# Patient Record
Sex: MALE | Race: WHITE | NOT HISPANIC OR LATINO | Employment: FULL TIME | URBAN - METROPOLITAN AREA
[De-identification: names, ages, dates, MRNs, and addresses within clinical notes are randomized per-mention and may not be internally consistent; named-entity substitution may affect disease eponyms.]

---

## 2023-07-27 DIAGNOSIS — E78.5 HYPERLIPIDEMIA, UNSPECIFIED: ICD-10-CM

## 2023-07-27 RX ORDER — ROSUVASTATIN CALCIUM 20 MG/1
20 TABLET, COATED ORAL DAILY
Qty: 90 TABLET | Refills: 0 | Status: SHIPPED | OUTPATIENT
Start: 2023-07-27 | End: 2023-10-26

## 2023-08-30 RX ORDER — LOSARTAN POTASSIUM 25 MG/1
25 TABLET ORAL DAILY
COMMUNITY
End: 2023-08-31 | Stop reason: SDUPTHER

## 2023-08-30 RX ORDER — METFORMIN HYDROCHLORIDE 500 MG/1
500 TABLET, EXTENDED RELEASE ORAL
COMMUNITY
Start: 2022-08-29 | End: 2023-08-31 | Stop reason: SDUPTHER

## 2023-08-31 ENCOUNTER — OFFICE VISIT (OUTPATIENT)
Dept: PRIMARY CARE | Facility: CLINIC | Age: 63
End: 2023-08-31
Payer: COMMERCIAL

## 2023-08-31 VITALS — DIASTOLIC BLOOD PRESSURE: 88 MMHG | BODY MASS INDEX: 42.37 KG/M2 | SYSTOLIC BLOOD PRESSURE: 152 MMHG | WEIGHT: 315 LBS

## 2023-08-31 DIAGNOSIS — E78.2 MIXED HYPERLIPIDEMIA: ICD-10-CM

## 2023-08-31 DIAGNOSIS — Z00.00 HEALTH CARE MAINTENANCE: ICD-10-CM

## 2023-08-31 DIAGNOSIS — E66.01 CLASS 3 SEVERE OBESITY DUE TO EXCESS CALORIES WITH SERIOUS COMORBIDITY AND BODY MASS INDEX (BMI) OF 40.0 TO 44.9 IN ADULT (MULTI): ICD-10-CM

## 2023-08-31 DIAGNOSIS — Z12.11 COLON CANCER SCREENING: ICD-10-CM

## 2023-08-31 DIAGNOSIS — Z12.5 SCREENING FOR PROSTATE CANCER: ICD-10-CM

## 2023-08-31 DIAGNOSIS — Z00.00 HEALTHCARE MAINTENANCE: ICD-10-CM

## 2023-08-31 DIAGNOSIS — E11.69 TYPE 2 DIABETES MELLITUS WITH OTHER SPECIFIED COMPLICATION, WITHOUT LONG-TERM CURRENT USE OF INSULIN (MULTI): ICD-10-CM

## 2023-08-31 DIAGNOSIS — R29.818 SUSPECTED SLEEP APNEA: ICD-10-CM

## 2023-08-31 DIAGNOSIS — Z00.00 ENCOUNTER FOR PREVENTIVE HEALTH EXAMINATION: ICD-10-CM

## 2023-08-31 DIAGNOSIS — I10 PRIMARY HYPERTENSION: Primary | ICD-10-CM

## 2023-08-31 PROCEDURE — 1036F TOBACCO NON-USER: CPT | Performed by: STUDENT IN AN ORGANIZED HEALTH CARE EDUCATION/TRAINING PROGRAM

## 2023-08-31 PROCEDURE — 90471 IMMUNIZATION ADMIN: CPT | Performed by: STUDENT IN AN ORGANIZED HEALTH CARE EDUCATION/TRAINING PROGRAM

## 2023-08-31 PROCEDURE — 3077F SYST BP >= 140 MM HG: CPT | Performed by: STUDENT IN AN ORGANIZED HEALTH CARE EDUCATION/TRAINING PROGRAM

## 2023-08-31 PROCEDURE — 3008F BODY MASS INDEX DOCD: CPT | Performed by: STUDENT IN AN ORGANIZED HEALTH CARE EDUCATION/TRAINING PROGRAM

## 2023-08-31 PROCEDURE — 90732 PPSV23 VACC 2 YRS+ SUBQ/IM: CPT | Performed by: STUDENT IN AN ORGANIZED HEALTH CARE EDUCATION/TRAINING PROGRAM

## 2023-08-31 PROCEDURE — 99387 INIT PM E/M NEW PAT 65+ YRS: CPT | Performed by: STUDENT IN AN ORGANIZED HEALTH CARE EDUCATION/TRAINING PROGRAM

## 2023-08-31 PROCEDURE — 4010F ACE/ARB THERAPY RXD/TAKEN: CPT | Performed by: STUDENT IN AN ORGANIZED HEALTH CARE EDUCATION/TRAINING PROGRAM

## 2023-08-31 PROCEDURE — 3079F DIAST BP 80-89 MM HG: CPT | Performed by: STUDENT IN AN ORGANIZED HEALTH CARE EDUCATION/TRAINING PROGRAM

## 2023-08-31 RX ORDER — LOSARTAN POTASSIUM 25 MG/1
25 TABLET ORAL DAILY
Qty: 90 TABLET | Refills: 1 | Status: SHIPPED | OUTPATIENT
Start: 2023-08-31 | End: 2023-12-18 | Stop reason: SDUPTHER

## 2023-08-31 RX ORDER — METFORMIN HYDROCHLORIDE 500 MG/1
500 TABLET, EXTENDED RELEASE ORAL
Qty: 180 TABLET | Refills: 1 | Status: SHIPPED | OUTPATIENT
Start: 2023-08-31 | End: 2024-03-18

## 2023-08-31 NOTE — PROGRESS NOTES
Subjective   Patient ID: Eligio Hogue is a 63 y.o. male who presents for Establish Care.    HPI   Initial PCP visit.    Yearly physical.    Mr. Eligio Hogue is a 63 year old male with h/o DM2, HTN, HLD, obesity.    He has been seeing cardiology recently for intermittent chest discomfort, says he has had recent stress test, echo, cardiac event monitor. No current CP. CP is not associated with exertion.    He was diagnosed with DM2 about 1 year ago. He was advised to start metformin at that time, but he did not do so 2/2 concern for SE.    He complains of daytime fatigue, does not think he snores much.    He lives in [place] with his wife.  for [employer]. He quit smoking more than 15 years ago. Minimal alcohol use.    Mother had heart valve disease and ESRD.  Brother has DM and ESRD  Review of Systems  12-point ROS reviewed and was negative unless otherwise noted in HPI.    Objective   /88   Wt 60.8 kg (134 lb)   BMI 17.20 kg/m²     Physical Exam  GEN: conversant, NAD  HEENT: PERRL, EOMI, MMM  NECK: supple, no carotid bruits appreciated b/l  CV: S1, S2, RRR  PULM: CTAB  ABD: soft, NT, ND  NEURO: no new gross focal deficits  EXT: no sig LE edema  PSYCH: appropriate affect    Assessment/Plan     #DM2: start metformin, discussed SE profile. Repeat A1c prior to our next visit.    #Chest discomfort: he is seeing Dr. Mejia from cardiology. Had recent stress test, echo, event monitor    #HLD: continue statin, check lipids    #HTN: restart losartan, monitor metabolic panel    #fatigue/Severe obesity, class 3: advised increased efforts at diet, exercise, weight loss. Referral to sleep medicine for suspected FLOYD.    #Health maintenance: colonoscopy done 6/2022, repeat ordered. He says that he has had AAA screening. Advised Shingrix, yearly flu shot. Pneumovax given 2023. Routine labs.    RTC 3 mo

## 2023-09-08 ENCOUNTER — LAB (OUTPATIENT)
Dept: LAB | Facility: LAB | Age: 63
End: 2023-09-08
Payer: COMMERCIAL

## 2023-09-08 DIAGNOSIS — Z00.00 HEALTHCARE MAINTENANCE: ICD-10-CM

## 2023-09-08 DIAGNOSIS — Z12.5 SCREENING FOR PROSTATE CANCER: ICD-10-CM

## 2023-09-08 DIAGNOSIS — Z00.00 HEALTH CARE MAINTENANCE: ICD-10-CM

## 2023-09-08 LAB
ALANINE AMINOTRANSFERASE (SGPT) (U/L) IN SER/PLAS: 16 U/L (ref 10–52)
ALBUMIN (G/DL) IN SER/PLAS: 4 G/DL (ref 3.4–5)
ALKALINE PHOSPHATASE (U/L) IN SER/PLAS: 61 U/L (ref 33–136)
ANION GAP IN SER/PLAS: 11 MMOL/L (ref 10–20)
ASPARTATE AMINOTRANSFERASE (SGOT) (U/L) IN SER/PLAS: 17 U/L (ref 9–39)
BILIRUBIN TOTAL (MG/DL) IN SER/PLAS: 0.7 MG/DL (ref 0–1.2)
CALCIUM (MG/DL) IN SER/PLAS: 9 MG/DL (ref 8.6–10.6)
CARBON DIOXIDE, TOTAL (MMOL/L) IN SER/PLAS: 31 MMOL/L (ref 21–32)
CHLORIDE (MMOL/L) IN SER/PLAS: 103 MMOL/L (ref 98–107)
CHOLESTEROL (MG/DL) IN SER/PLAS: 87 MG/DL (ref 0–199)
CHOLESTEROL IN HDL (MG/DL) IN SER/PLAS: 28.9 MG/DL
CHOLESTEROL/HDL RATIO: 3
CREATININE (MG/DL) IN SER/PLAS: 1.35 MG/DL (ref 0.5–1.3)
ERYTHROCYTE DISTRIBUTION WIDTH (RATIO) BY AUTOMATED COUNT: 13.4 % (ref 11.5–14.5)
ERYTHROCYTE MEAN CORPUSCULAR HEMOGLOBIN CONCENTRATION (G/DL) BY AUTOMATED: 33.9 G/DL (ref 32–36)
ERYTHROCYTE MEAN CORPUSCULAR VOLUME (FL) BY AUTOMATED COUNT: 87 FL (ref 80–100)
ERYTHROCYTES (10*6/UL) IN BLOOD BY AUTOMATED COUNT: 4.99 X10E12/L (ref 4.5–5.9)
ESTIMATED AVERAGE GLUCOSE FOR HBA1C: 151 MG/DL
GFR MALE: 58 ML/MIN/1.73M2
GLUCOSE (MG/DL) IN SER/PLAS: 158 MG/DL (ref 74–99)
HEMATOCRIT (%) IN BLOOD BY AUTOMATED COUNT: 43.4 % (ref 41–52)
HEMOGLOBIN (G/DL) IN BLOOD: 14.7 G/DL (ref 13.5–17.5)
HEMOGLOBIN A1C/HEMOGLOBIN TOTAL IN BLOOD: 6.9 %
LDL: 35 MG/DL (ref 0–99)
LEUKOCYTES (10*3/UL) IN BLOOD BY AUTOMATED COUNT: 8.9 X10E9/L (ref 4.4–11.3)
NRBC (PER 100 WBCS) BY AUTOMATED COUNT: 0 /100 WBC (ref 0–0)
PLATELETS (10*3/UL) IN BLOOD AUTOMATED COUNT: 240 X10E9/L (ref 150–450)
POTASSIUM (MMOL/L) IN SER/PLAS: 4.6 MMOL/L (ref 3.5–5.3)
PROSTATE SPECIFIC AG (NG/ML) IN SER/PLAS: 1.78 NG/ML (ref 0–4)
PROTEIN TOTAL: 6.3 G/DL (ref 6.4–8.2)
SODIUM (MMOL/L) IN SER/PLAS: 140 MMOL/L (ref 136–145)
THYROTROPIN (MIU/L) IN SER/PLAS BY DETECTION LIMIT <= 0.05 MIU/L: 5.45 MIU/L (ref 0.44–3.98)
THYROXINE (T4) FREE (NG/DL) IN SER/PLAS: 0.9 NG/DL (ref 0.78–1.48)
TRIGLYCERIDE (MG/DL) IN SER/PLAS: 115 MG/DL (ref 0–149)
UREA NITROGEN (MG/DL) IN SER/PLAS: 21 MG/DL (ref 6–23)
VLDL: 23 MG/DL (ref 0–40)

## 2023-09-08 PROCEDURE — 80053 COMPREHEN METABOLIC PANEL: CPT

## 2023-09-08 PROCEDURE — 36415 COLL VENOUS BLD VENIPUNCTURE: CPT

## 2023-09-08 PROCEDURE — 84443 ASSAY THYROID STIM HORMONE: CPT

## 2023-09-08 PROCEDURE — 85027 COMPLETE CBC AUTOMATED: CPT

## 2023-09-08 PROCEDURE — 84439 ASSAY OF FREE THYROXINE: CPT

## 2023-09-08 PROCEDURE — 80061 LIPID PANEL: CPT

## 2023-09-08 PROCEDURE — 83036 HEMOGLOBIN GLYCOSYLATED A1C: CPT

## 2023-09-08 PROCEDURE — 84153 ASSAY OF PSA TOTAL: CPT

## 2023-09-19 ENCOUNTER — OFFICE VISIT (OUTPATIENT)
Dept: PRIMARY CARE | Facility: CLINIC | Age: 63
End: 2023-09-19
Payer: COMMERCIAL

## 2023-09-19 VITALS — SYSTOLIC BLOOD PRESSURE: 146 MMHG | DIASTOLIC BLOOD PRESSURE: 90 MMHG

## 2023-09-19 DIAGNOSIS — Z00.00 HEALTHCARE MAINTENANCE: ICD-10-CM

## 2023-09-19 DIAGNOSIS — E78.2 MIXED HYPERLIPIDEMIA: ICD-10-CM

## 2023-09-19 DIAGNOSIS — E11.69 TYPE 2 DIABETES MELLITUS WITH OTHER SPECIFIED COMPLICATION, WITHOUT LONG-TERM CURRENT USE OF INSULIN (MULTI): Primary | ICD-10-CM

## 2023-09-19 DIAGNOSIS — I10 PRIMARY HYPERTENSION: ICD-10-CM

## 2023-09-19 DIAGNOSIS — E66.01 CLASS 3 SEVERE OBESITY DUE TO EXCESS CALORIES WITH SERIOUS COMORBIDITY AND BODY MASS INDEX (BMI) OF 40.0 TO 44.9 IN ADULT (MULTI): ICD-10-CM

## 2023-09-19 PROCEDURE — 3077F SYST BP >= 140 MM HG: CPT | Performed by: STUDENT IN AN ORGANIZED HEALTH CARE EDUCATION/TRAINING PROGRAM

## 2023-09-19 PROCEDURE — 99213 OFFICE O/P EST LOW 20 MIN: CPT | Performed by: STUDENT IN AN ORGANIZED HEALTH CARE EDUCATION/TRAINING PROGRAM

## 2023-09-19 PROCEDURE — 3008F BODY MASS INDEX DOCD: CPT | Performed by: STUDENT IN AN ORGANIZED HEALTH CARE EDUCATION/TRAINING PROGRAM

## 2023-09-19 PROCEDURE — 4010F ACE/ARB THERAPY RXD/TAKEN: CPT | Performed by: STUDENT IN AN ORGANIZED HEALTH CARE EDUCATION/TRAINING PROGRAM

## 2023-09-19 PROCEDURE — 3044F HG A1C LEVEL LT 7.0%: CPT | Performed by: STUDENT IN AN ORGANIZED HEALTH CARE EDUCATION/TRAINING PROGRAM

## 2023-09-19 PROCEDURE — 3080F DIAST BP >= 90 MM HG: CPT | Performed by: STUDENT IN AN ORGANIZED HEALTH CARE EDUCATION/TRAINING PROGRAM

## 2023-09-19 PROCEDURE — 1036F TOBACCO NON-USER: CPT | Performed by: STUDENT IN AN ORGANIZED HEALTH CARE EDUCATION/TRAINING PROGRAM

## 2023-09-19 NOTE — PROGRESS NOTES
Subjective   Patient ID: Eligio Hogue is a 63 y.o. male who presents for Follow-up.    HPI   Routine fu.  At our last visit patient was started on metformin and losartan. He is tolerating the medications well. He is monitoring BP at home, systolic 120s-130s.      Review of Systems  12-point ROS reviewed and was negative unless otherwise noted in HPI.    Objective   There were no vitals taken for this visit.    Physical Exam  GEN: conversant, NAD  HEENT: PERRL, EOMI, MMM  NECK: supple, no carotid bruits appreciated b/l  CV: S1, S2, RRR  PULM: CTAB  ABD: soft, NT, obese  NEURO: no new gross focal deficits  EXT: no sig LE edema  PSYCH: appropriate affect    Assessment/Plan     #DM2: continue metformin. A1c 6.9% in 9/2023.     #Chest discomfort: he is seeing Dr. Mejia from cardiology. Had recent stress test, echo, event monitor     #HLD: continue statin, check lipids     #HTN: continue losartan     #fatigue/Severe obesity, class 3: advised increased efforts at diet, exercise, weight loss. Referral to sleep medicine for suspected FLOYD.     #Health maintenance: colonoscopy done 6/2022, repeat ordered. He says that he has had AAA screening. Advised Shingrix, yearly flu shot. Pneumovax given 2023. Recent labs reviewed.     RTC 3 mo

## 2023-10-26 DIAGNOSIS — E78.5 HYPERLIPIDEMIA, UNSPECIFIED: ICD-10-CM

## 2023-10-26 RX ORDER — ROSUVASTATIN CALCIUM 20 MG/1
20 TABLET, COATED ORAL DAILY
Qty: 90 TABLET | Refills: 0 | Status: SHIPPED | OUTPATIENT
Start: 2023-10-26 | End: 2024-01-29

## 2023-11-13 ENCOUNTER — PREP FOR PROCEDURE (OUTPATIENT)
Dept: GASTROENTEROLOGY | Facility: HOSPITAL | Age: 63
End: 2023-11-13
Payer: COMMERCIAL

## 2023-11-27 ENCOUNTER — HOSPITAL ENCOUNTER (OUTPATIENT)
Dept: GASTROENTEROLOGY | Facility: HOSPITAL | Age: 63
Discharge: HOME | End: 2023-11-27
Payer: COMMERCIAL

## 2023-11-27 ENCOUNTER — ANESTHESIA EVENT (OUTPATIENT)
Dept: GASTROENTEROLOGY | Facility: HOSPITAL | Age: 63
End: 2023-11-27
Payer: COMMERCIAL

## 2023-11-27 ENCOUNTER — ANESTHESIA (OUTPATIENT)
Dept: GASTROENTEROLOGY | Facility: HOSPITAL | Age: 63
End: 2023-11-27
Payer: COMMERCIAL

## 2023-11-27 VITALS
OXYGEN SATURATION: 97 % | DIASTOLIC BLOOD PRESSURE: 81 MMHG | TEMPERATURE: 96.8 F | RESPIRATION RATE: 16 BRPM | SYSTOLIC BLOOD PRESSURE: 150 MMHG | HEART RATE: 56 BPM

## 2023-11-27 DIAGNOSIS — D12.6 COLON ADENOMA: ICD-10-CM

## 2023-11-27 DIAGNOSIS — Z12.11 COLON CANCER SCREENING: ICD-10-CM

## 2023-11-27 PROBLEM — Z98.890 HISTORY OF GENERAL ANESTHESIA: Status: ACTIVE | Noted: 2023-11-27

## 2023-11-27 LAB
GLUCOSE BLD MANUAL STRIP-MCNC: 131 MG/DL (ref 74–99)
GLUCOSE BLD MANUAL STRIP-MCNC: 157 MG/DL (ref 74–99)

## 2023-11-27 PROCEDURE — 3700000001 HC GENERAL ANESTHESIA TIME - INITIAL BASE CHARGE

## 2023-11-27 PROCEDURE — A45385 PR COLONOSCOPY,REMV LESN,SNARE: Performed by: ANESTHESIOLOGY

## 2023-11-27 PROCEDURE — 82947 ASSAY GLUCOSE BLOOD QUANT: CPT

## 2023-11-27 PROCEDURE — 45385 COLONOSCOPY W/LESION REMOVAL: CPT | Performed by: INTERNAL MEDICINE

## 2023-11-27 PROCEDURE — 2500000004 HC RX 250 GENERAL PHARMACY W/ HCPCS (ALT 636 FOR OP/ED): Performed by: ANESTHESIOLOGY

## 2023-11-27 PROCEDURE — 2500000005 HC RX 250 GENERAL PHARMACY W/O HCPCS: Performed by: NURSE ANESTHETIST, CERTIFIED REGISTERED

## 2023-11-27 PROCEDURE — 2500000004 HC RX 250 GENERAL PHARMACY W/ HCPCS (ALT 636 FOR OP/ED): Performed by: NURSE ANESTHETIST, CERTIFIED REGISTERED

## 2023-11-27 PROCEDURE — 7100000009 HC PHASE TWO TIME - INITIAL BASE CHARGE

## 2023-11-27 PROCEDURE — 88305 TISSUE EXAM BY PATHOLOGIST: CPT | Performed by: PATHOLOGY

## 2023-11-27 PROCEDURE — 88305 TISSUE EXAM BY PATHOLOGIST: CPT | Mod: TC | Performed by: INTERNAL MEDICINE

## 2023-11-27 PROCEDURE — 45381 COLONOSCOPY SUBMUCOUS NJX: CPT | Performed by: INTERNAL MEDICINE

## 2023-11-27 PROCEDURE — 45380 COLONOSCOPY AND BIOPSY: CPT | Performed by: INTERNAL MEDICINE

## 2023-11-27 PROCEDURE — A45385 PR COLONOSCOPY,REMV LESN,SNARE: Performed by: NURSE ANESTHETIST, CERTIFIED REGISTERED

## 2023-11-27 PROCEDURE — 7100000010 HC PHASE TWO TIME - EACH INCREMENTAL 1 MINUTE

## 2023-11-27 PROCEDURE — 3700000002 HC GENERAL ANESTHESIA TIME - EACH INCREMENTAL 1 MINUTE

## 2023-11-27 RX ORDER — PROPOFOL 10 MG/ML
INJECTION, EMULSION INTRAVENOUS CONTINUOUS PRN
Status: DISCONTINUED | OUTPATIENT
Start: 2023-11-27 | End: 2023-11-27

## 2023-11-27 RX ORDER — PROPOFOL 10 MG/ML
INJECTION, EMULSION INTRAVENOUS AS NEEDED
Status: DISCONTINUED | OUTPATIENT
Start: 2023-11-27 | End: 2023-11-27

## 2023-11-27 RX ORDER — SODIUM CHLORIDE, SODIUM LACTATE, POTASSIUM CHLORIDE, CALCIUM CHLORIDE 600; 310; 30; 20 MG/100ML; MG/100ML; MG/100ML; MG/100ML
75 INJECTION, SOLUTION INTRAVENOUS CONTINUOUS
Status: DISCONTINUED | OUTPATIENT
Start: 2023-11-27 | End: 2023-11-28 | Stop reason: HOSPADM

## 2023-11-27 RX ORDER — LIDOCAINE HCL/PF 100 MG/5ML
SYRINGE (ML) INTRAVENOUS AS NEEDED
Status: DISCONTINUED | OUTPATIENT
Start: 2023-11-27 | End: 2023-11-27

## 2023-11-27 RX ADMIN — SODIUM CHLORIDE, POTASSIUM CHLORIDE, SODIUM LACTATE AND CALCIUM CHLORIDE 75 ML/HR: 600; 310; 30; 20 INJECTION, SOLUTION INTRAVENOUS at 09:44

## 2023-11-27 RX ADMIN — PROPOFOL 20 MG: 10 INJECTION, EMULSION INTRAVENOUS at 10:38

## 2023-11-27 RX ADMIN — LIDOCAINE HYDROCHLORIDE 80 MG: 20 INJECTION INTRAVENOUS at 10:30

## 2023-11-27 RX ADMIN — PROPOFOL 50 MG: 10 INJECTION, EMULSION INTRAVENOUS at 10:30

## 2023-11-27 RX ADMIN — PROPOFOL 80 MCG/KG/MIN: 10 INJECTION, EMULSION INTRAVENOUS at 10:30

## 2023-11-27 SDOH — HEALTH STABILITY: MENTAL HEALTH: CURRENT SMOKER: 0

## 2023-11-27 ASSESSMENT — PAIN - FUNCTIONAL ASSESSMENT
PAIN_FUNCTIONAL_ASSESSMENT: 0-10

## 2023-11-27 ASSESSMENT — PAIN SCALES - GENERAL
PAINLEVEL_OUTOF10: 0 - NO PAIN
PAIN_LEVEL: 0
PAINLEVEL_OUTOF10: 0 - NO PAIN

## 2023-11-27 ASSESSMENT — COLUMBIA-SUICIDE SEVERITY RATING SCALE - C-SSRS
6. HAVE YOU EVER DONE ANYTHING, STARTED TO DO ANYTHING, OR PREPARED TO DO ANYTHING TO END YOUR LIFE?: NO
1. IN THE PAST MONTH, HAVE YOU WISHED YOU WERE DEAD OR WISHED YOU COULD GO TO SLEEP AND NOT WAKE UP?: NO
2. HAVE YOU ACTUALLY HAD ANY THOUGHTS OF KILLING YOURSELF?: NO

## 2023-11-27 NOTE — ANESTHESIA POSTPROCEDURE EVALUATION
Patient: Eligio Hogue    Procedure Summary       Date: 11/27/23 Room / Location: [facility]    Anesthesia Start: 1026 Anesthesia Stop: 1119    Procedure: COLONOSCOPY Diagnosis: Colon adenoma    Scheduled Providers: Dariel Burt MD Responsible Provider: Rolando Aguilar MD    Anesthesia Type: MAC ASA Status: 3            Anesthesia Type: MAC    Vitals Value Taken Time   /61 11/27/23 1117   Temp 36 °C (96.8 °F) 11/27/23 1117   Pulse 70 11/27/23 1117   Resp 18 11/27/23 1117   SpO2 99 % 11/27/23 1117       Anesthesia Post Evaluation    Patient location during evaluation: PACU  Patient participation: complete - patient participated  Level of consciousness: awake and alert  Pain score: 0  Pain management: adequate  Airway patency: patent  Cardiovascular status: acceptable, blood pressure returned to baseline and hemodynamically stable  Respiratory status: acceptable and face mask  Hydration status: acceptable  Postoperative Nausea and Vomiting: none        There were no known notable events for this encounter.

## 2023-11-27 NOTE — H&P
Procedure H&P    Patient Profile-Procedures  Name Eligio Hogue  Date of Birth 1960  MRN [id number]  Address   [address]  [facility] [id number]  [facility] [address]    Primary Phone Number [phone]  Secondary Phone Number    Alycia Maciel    Procedure(s):  Procedures: Colonoscopy  Primary contact name and number   Extended Emergency Contact Information  Primary Emergency Contact: Ellie Hogue  Home Phone: [phone]  Relation: Spouse    General Health  Weight There were no vitals filed for this visit.  BMI There is no height or weight on file to calculate BMI.    Allergies  Allergies   Allergen Reactions    Penicillins Other       Past Medical History   Past Medical History:   Diagnosis Date    Other chest pain 02/16/2021    Chest tightness or pressure    Pain in right foot 04/22/2016    Acute foot pain, right    Personal history of other benign neoplasm     History of benign neoplasm of pharynx    Personal history of other diseases of the respiratory system     History of chronic obstructive lung disease    Personal history of other endocrine, nutritional and metabolic disease     History of thyroid disorder    Rash and other nonspecific skin eruption 02/16/2021    Skin rash       Provider assessment  Diagnosis: Colon Cancer Screening/Surveillance   Medication Reviewed - yes  Prior to Admission medications    Medication Sig Start Date End Date Taking? Authorizing Provider   losartan (Cozaar) 25 mg tablet Take 1 tablet (25 mg) by mouth once daily. 8/31/23  Yes Vernon Estrada MD   metFORMIN  mg 24 hr tablet Take 1 tablet (500 mg) by mouth 2 times a day with meals. 8/31/23  Yes Vernon Estrada MD   rosuvastatin (Crestor) 20 mg tablet TAKE 1 TABLET BY MOUTH EVERY DAY 10/26/23  Yes Vernon Estrada MD       Physical Exam  Vitals:    11/27/23 0918   Pulse: 65   Resp: 18   Temp: 36.1 °C (97 °F)   SpO2: 94%        General: A&Ox3, NAD.  HEENT: AT/NC.   CV: RRR. No murmur.  Resp: CTA  bilaterally. No wheezing, rhonchi or rales.   GI: Soft, NT/ND. BSx4.  Extrem: No edema. Pulses intact.  Skin: No Jaundice.   Neuro: No focal deficits.   Psych: Normal mood and affect.      Procedure Plan - pre-procedural (re)assesment completed by physician:  discharge/transfer patient when discharge criteria met    Dariel Burt MD  11/27/2023 10:25 AM

## 2023-11-27 NOTE — ANESTHESIA PREPROCEDURE EVALUATION
Patient: Eligio Hogue    Procedure Information       Date/Time: 11/27/23 1000    Scheduled providers: Dariel Burt MD    Procedure: COLONOSCOPY    Location: [facility]            Relevant Problems   Anesthesia   (+) History of general anesthesia       Clinical information reviewed:   Tobacco  Allergies  Meds   Med Hx  Surg Hx   Fam Hx  Soc Hx        NPO Detail:  NPO/Void Status  Date of Last Liquid: 11/27/23  Time of Last Liquid: 0400  Date of Last Solid: 11/26/23  Time of Last Solid: 0800         Physical Exam    Airway  Mallampati: III  TM distance: >3 FB  Neck ROM: full     Cardiovascular - normal exam     Dental   (+) upper dentures, lower dentures     Pulmonary - normal exam     Abdominal   (+) obese             Anesthesia Plan    ASA 3     MAC     The patient is not a current smoker.  Patient was previously instructed to abstain from smoking on day of procedure.  Patient did not smoke on day of procedure.    intravenous induction   Anesthetic plan and risks discussed with patient.  Use of blood products discussed with patient who consented to blood products.    Plan discussed with CRNA.

## 2023-12-12 LAB
LABORATORY COMMENT REPORT: NORMAL
PATH REPORT.FINAL DX SPEC: NORMAL
PATH REPORT.GROSS SPEC: NORMAL
PATH REPORT.TOTAL CANCER: NORMAL

## 2023-12-14 DIAGNOSIS — D12.6 COLON ADENOMA: Primary | ICD-10-CM

## 2023-12-18 ENCOUNTER — OFFICE VISIT (OUTPATIENT)
Dept: PRIMARY CARE | Facility: CLINIC | Age: 63
End: 2023-12-18
Payer: COMMERCIAL

## 2023-12-18 VITALS
DIASTOLIC BLOOD PRESSURE: 82 MMHG | BODY MASS INDEX: 40.43 KG/M2 | HEIGHT: 74 IN | WEIGHT: 315 LBS | SYSTOLIC BLOOD PRESSURE: 144 MMHG

## 2023-12-18 DIAGNOSIS — M79.89 LEG SWELLING: Primary | ICD-10-CM

## 2023-12-18 DIAGNOSIS — I10 PRIMARY HYPERTENSION: ICD-10-CM

## 2023-12-18 DIAGNOSIS — E78.2 MIXED HYPERLIPIDEMIA: ICD-10-CM

## 2023-12-18 DIAGNOSIS — Z00.00 HEALTHCARE MAINTENANCE: ICD-10-CM

## 2023-12-18 DIAGNOSIS — E11.69 TYPE 2 DIABETES MELLITUS WITH OTHER SPECIFIED COMPLICATION, WITHOUT LONG-TERM CURRENT USE OF INSULIN (MULTI): ICD-10-CM

## 2023-12-18 DIAGNOSIS — E66.01 CLASS 3 SEVERE OBESITY DUE TO EXCESS CALORIES WITH SERIOUS COMORBIDITY AND BODY MASS INDEX (BMI) OF 40.0 TO 44.9 IN ADULT (MULTI): ICD-10-CM

## 2023-12-18 PROCEDURE — 3077F SYST BP >= 140 MM HG: CPT | Performed by: STUDENT IN AN ORGANIZED HEALTH CARE EDUCATION/TRAINING PROGRAM

## 2023-12-18 PROCEDURE — 4010F ACE/ARB THERAPY RXD/TAKEN: CPT | Performed by: STUDENT IN AN ORGANIZED HEALTH CARE EDUCATION/TRAINING PROGRAM

## 2023-12-18 PROCEDURE — 99397 PER PM REEVAL EST PAT 65+ YR: CPT | Performed by: STUDENT IN AN ORGANIZED HEALTH CARE EDUCATION/TRAINING PROGRAM

## 2023-12-18 PROCEDURE — 1159F MED LIST DOCD IN RCRD: CPT | Performed by: STUDENT IN AN ORGANIZED HEALTH CARE EDUCATION/TRAINING PROGRAM

## 2023-12-18 PROCEDURE — 1036F TOBACCO NON-USER: CPT | Performed by: STUDENT IN AN ORGANIZED HEALTH CARE EDUCATION/TRAINING PROGRAM

## 2023-12-18 PROCEDURE — 3044F HG A1C LEVEL LT 7.0%: CPT | Performed by: STUDENT IN AN ORGANIZED HEALTH CARE EDUCATION/TRAINING PROGRAM

## 2023-12-18 PROCEDURE — 3079F DIAST BP 80-89 MM HG: CPT | Performed by: STUDENT IN AN ORGANIZED HEALTH CARE EDUCATION/TRAINING PROGRAM

## 2023-12-18 PROCEDURE — 3008F BODY MASS INDEX DOCD: CPT | Performed by: STUDENT IN AN ORGANIZED HEALTH CARE EDUCATION/TRAINING PROGRAM

## 2023-12-18 PROCEDURE — 1126F AMNT PAIN NOTED NONE PRSNT: CPT | Performed by: STUDENT IN AN ORGANIZED HEALTH CARE EDUCATION/TRAINING PROGRAM

## 2023-12-18 RX ORDER — METOPROLOL TARTRATE 25 MG/1
25 TABLET, FILM COATED ORAL 2 TIMES DAILY
COMMUNITY
Start: 2023-09-26 | End: 2024-03-18 | Stop reason: SDUPTHER

## 2023-12-18 RX ORDER — LOSARTAN POTASSIUM 50 MG/1
50 TABLET ORAL DAILY
Qty: 90 TABLET | Refills: 1 | Status: SHIPPED | OUTPATIENT
Start: 2023-12-18

## 2023-12-18 NOTE — PROGRESS NOTES
"Subjective   Patient ID: Eligio Hogue is a 63 y.o. male who presents for Annual Exam.    HPI   Routine fu. Yearly physical.  Med refill.  Since our last visit, patient was diagnosed with pAfib by cardiology. He was started on metoprolol. AC was advised, but patient has not yet started this.    He is not very physically active.    He complains of swelling in his left leg for several weeks, no pain.    Review of Systems  12-point ROS reviewed and was negative unless otherwise noted in HPI.    Objective   /82   Ht 1.88 m (6' 2\")   Wt 150 kg (330 lb)   BMI 42.37 kg/m²     Physical Exam  GEN: conversant, NAD  HEENT: PERRL, EOMI, MMM  NECK: supple, full  CV: S1, S2, RRR  PULM: CTAB  ABD: soft, NT, ND  NEURO: no new gross focal deficits  EXT: 1+ LLE edema  PSYCH: appropriate affect    Assessment/Plan     #HTN: increase losartan to 50mg daily    #DM2: continue metformin. A1c 6.9% in 9/2023.     #Afib: he is seeing Dr. Mejia from cardiology. Had recent stress test, echo, event monitor. Also had recent diagnosis of Afib, AC has been recommended, patient is trying to decide on his AC options.     #HLD: continue statin    #fatigue/Severe obesity, class 3: advised increased efforts at diet, exercise, weight loss. Referral to sleep medicine for suspected FLOYD.     #Health maintenance: colonoscopy done 11/2023, he has been referred to [facility] for difficult to remove polyp. He says that he has had AAA screening. Advised Shingrix, yearly flu shot. Pneumovax given 2023. Recent labs reviewed.     RTC 3 mo     "

## 2024-01-10 ENCOUNTER — HOSPITAL ENCOUNTER (OUTPATIENT)
Dept: VASCULAR MEDICINE | Facility: CLINIC | Age: 64
Discharge: HOME | End: 2024-01-10
Payer: COMMERCIAL

## 2024-01-10 DIAGNOSIS — R60.0 LOCALIZED EDEMA: ICD-10-CM

## 2024-01-10 DIAGNOSIS — M79.89 LEG SWELLING: ICD-10-CM

## 2024-01-10 PROCEDURE — 93971 EXTREMITY STUDY: CPT | Performed by: SURGERY

## 2024-01-10 PROCEDURE — 93971 EXTREMITY STUDY: CPT

## 2024-03-06 DIAGNOSIS — E11.69 TYPE 2 DIABETES MELLITUS WITH OTHER SPECIFIED COMPLICATION, WITHOUT LONG-TERM CURRENT USE OF INSULIN (MULTI): ICD-10-CM

## 2024-03-18 ENCOUNTER — OFFICE VISIT (OUTPATIENT)
Dept: PRIMARY CARE | Facility: CLINIC | Age: 64
End: 2024-03-18
Payer: COMMERCIAL

## 2024-03-18 VITALS — SYSTOLIC BLOOD PRESSURE: 130 MMHG | DIASTOLIC BLOOD PRESSURE: 78 MMHG

## 2024-03-18 DIAGNOSIS — R79.89 ELEVATED TSH: ICD-10-CM

## 2024-03-18 DIAGNOSIS — I10 PRIMARY HYPERTENSION: ICD-10-CM

## 2024-03-18 DIAGNOSIS — E78.2 MIXED HYPERLIPIDEMIA: ICD-10-CM

## 2024-03-18 DIAGNOSIS — I48.0 PAROXYSMAL ATRIAL FIBRILLATION (MULTI): Primary | ICD-10-CM

## 2024-03-18 DIAGNOSIS — K63.5 POLYP OF COLON, UNSPECIFIED PART OF COLON, UNSPECIFIED TYPE: ICD-10-CM

## 2024-03-18 DIAGNOSIS — E66.01 CLASS 3 SEVERE OBESITY DUE TO EXCESS CALORIES WITH SERIOUS COMORBIDITY AND BODY MASS INDEX (BMI) OF 40.0 TO 44.9 IN ADULT (MULTI): ICD-10-CM

## 2024-03-18 DIAGNOSIS — R29.818 SUSPECTED SLEEP APNEA: ICD-10-CM

## 2024-03-18 DIAGNOSIS — E11.69 TYPE 2 DIABETES MELLITUS WITH OTHER SPECIFIED COMPLICATION, WITHOUT LONG-TERM CURRENT USE OF INSULIN (MULTI): ICD-10-CM

## 2024-03-18 PROCEDURE — 3008F BODY MASS INDEX DOCD: CPT | Performed by: STUDENT IN AN ORGANIZED HEALTH CARE EDUCATION/TRAINING PROGRAM

## 2024-03-18 PROCEDURE — 3075F SYST BP GE 130 - 139MM HG: CPT | Performed by: STUDENT IN AN ORGANIZED HEALTH CARE EDUCATION/TRAINING PROGRAM

## 2024-03-18 PROCEDURE — G2211 COMPLEX E/M VISIT ADD ON: HCPCS | Performed by: STUDENT IN AN ORGANIZED HEALTH CARE EDUCATION/TRAINING PROGRAM

## 2024-03-18 PROCEDURE — 3078F DIAST BP <80 MM HG: CPT | Performed by: STUDENT IN AN ORGANIZED HEALTH CARE EDUCATION/TRAINING PROGRAM

## 2024-03-18 PROCEDURE — 4010F ACE/ARB THERAPY RXD/TAKEN: CPT | Performed by: STUDENT IN AN ORGANIZED HEALTH CARE EDUCATION/TRAINING PROGRAM

## 2024-03-18 PROCEDURE — 99215 OFFICE O/P EST HI 40 MIN: CPT | Performed by: STUDENT IN AN ORGANIZED HEALTH CARE EDUCATION/TRAINING PROGRAM

## 2024-03-18 PROCEDURE — 1036F TOBACCO NON-USER: CPT | Performed by: STUDENT IN AN ORGANIZED HEALTH CARE EDUCATION/TRAINING PROGRAM

## 2024-03-18 RX ORDER — METOPROLOL TARTRATE 25 MG/1
25 TABLET, FILM COATED ORAL 2 TIMES DAILY
Qty: 180 TABLET | Refills: 1 | Status: SHIPPED | OUTPATIENT
Start: 2024-03-18

## 2024-03-18 RX ORDER — METFORMIN HYDROCHLORIDE 500 MG/1
500 TABLET, EXTENDED RELEASE ORAL
Qty: 180 TABLET | Refills: 1 | Status: SHIPPED | OUTPATIENT
Start: 2024-03-18

## 2024-03-18 NOTE — PROGRESS NOTES
"Subjective   Patient ID: Eligio Hogue is a 64 y.o. male who presents for Follow-up (Med refill).    HPI   Routine fu.  Med refill.  Longitudinal follow-up.  Since our last visit, patient stopped taking losartan and rosuvastatin \"because the didn't make me feel any different.\" He blood pressure at home is usually around 140/90. Blood sugars range from  typically.     He is still having intermittent episodes of Afib. Cardiology recommended patient take Eliquis, but he has so far declined this.    At our last visit, we strongly advised FLOYD evaluation, but patient did not pursue this. He has a watch that measures O2 levels when sleeping, patient brought this for review today, he thought levels were good, but he misinterpreted the graph which actually shows he spends most of the night with low oxygen levels.  Review of Systems  12-point ROS reviewed and was negative unless otherwise noted in HPI.    Objective   There were no vitals taken for this visit.    Physical Exam  GEN: conversant, NAD  HEENT: PERRL, EOMI, MMM  NECK: supple, no carotid bruits appreciated b/l  CV: S1, S2, RRR  PULM: CTAB  ABD: soft, NT, ND  NEURO: no new gross focal deficits  EXT: no sig LE edema  PSYCH: appropriate affect    Assessment/Plan     #fatigue/Severe obesity, class 3: strongly suspected FLOYD. Referral again to sleep medicine for suspected FLOYD. Long discussion today about importance of this. Advised increased efforts at diet, exercise, weight loss.     #HTN: resume losartan, monitor metabolic panel. Home readings reviewed.    #HLD: resume statin     #DM2: advised increasing metformin dose, patient declines. A1c 6.9% in 9/2023. Home glucose readings reviewed.     #Afib: he is seeing Dr. Garsia from cardiology. Had stress test, echo, event monitor. Also had recent diagnosis of Afib, AC has been recommended, patient has so far declined    #Elevated TSH: monitor     #Health maintenance: colonoscopy done 11/2023, he has been referred to " [facility] for difficult to remove polyp. He says that he has had AAA screening. Advised Shingrix, yearly flu shot. Pneumovax given 2023.      RTC 3 mo    I spent >40 minutes in direct patient care, counseling, and/or coordination of care.

## 2024-03-22 ENCOUNTER — TELEPHONE (OUTPATIENT)
Dept: GASTROENTEROLOGY | Facility: HOSPITAL | Age: 64
End: 2024-03-22
Payer: COMMERCIAL

## 2024-03-22 DIAGNOSIS — Z12.11 ENCOUNTER FOR SCREENING COLONOSCOPY: Primary | ICD-10-CM

## 2024-03-22 NOTE — TELEPHONE ENCOUNTER
Patient is scheduled for Colonoscopy , he Called in with Request for a prescription of Nulytely Prep be sent in to his  pharmacy on file.

## 2024-03-25 RX ORDER — POLYETHYLENE GLYCOL 3350, SODIUM CHLORIDE, SODIUM BICARBONATE, POTASSIUM CHLORIDE 420; 11.2; 5.72; 1.48 G/4L; G/4L; G/4L; G/4L
4000 POWDER, FOR SOLUTION ORAL ONCE
Qty: 4000 ML | Refills: 0 | Status: SHIPPED | OUTPATIENT
Start: 2024-03-25 | End: 2024-03-25

## 2024-04-05 ENCOUNTER — ANESTHESIA (OUTPATIENT)
Dept: GASTROENTEROLOGY | Facility: HOSPITAL | Age: 64
End: 2024-04-05
Payer: COMMERCIAL

## 2024-04-05 ENCOUNTER — ANESTHESIA EVENT (OUTPATIENT)
Dept: GASTROENTEROLOGY | Facility: HOSPITAL | Age: 64
End: 2024-04-05
Payer: COMMERCIAL

## 2024-04-05 ENCOUNTER — HOSPITAL ENCOUNTER (OUTPATIENT)
Dept: GASTROENTEROLOGY | Facility: HOSPITAL | Age: 64
Setting detail: OUTPATIENT SURGERY
Discharge: HOME | End: 2024-04-05
Payer: COMMERCIAL

## 2024-04-05 VITALS
RESPIRATION RATE: 18 BRPM | DIASTOLIC BLOOD PRESSURE: 73 MMHG | BODY MASS INDEX: 39.17 KG/M2 | HEIGHT: 75 IN | OXYGEN SATURATION: 95 % | TEMPERATURE: 97.3 F | SYSTOLIC BLOOD PRESSURE: 145 MMHG | HEART RATE: 65 BPM | WEIGHT: 315 LBS

## 2024-04-05 DIAGNOSIS — D12.6 COLON ADENOMA: ICD-10-CM

## 2024-04-05 LAB — GLUCOSE BLD MANUAL STRIP-MCNC: 175 MG/DL (ref 74–99)

## 2024-04-05 PROCEDURE — 82947 ASSAY GLUCOSE BLOOD QUANT: CPT

## 2024-04-05 PROCEDURE — 88305 TISSUE EXAM BY PATHOLOGIST: CPT | Mod: TC,AHULAB | Performed by: INTERNAL MEDICINE

## 2024-04-05 PROCEDURE — A45385 PR COLONOSCOPY,REMV LESN,SNARE: Performed by: NURSE ANESTHETIST, CERTIFIED REGISTERED

## 2024-04-05 PROCEDURE — 2500000004 HC RX 250 GENERAL PHARMACY W/ HCPCS (ALT 636 FOR OP/ED): Performed by: NURSE ANESTHETIST, CERTIFIED REGISTERED

## 2024-04-05 PROCEDURE — 2720000007 HC OR 272 NO HCPCS

## 2024-04-05 PROCEDURE — 3700000002 HC GENERAL ANESTHESIA TIME - EACH INCREMENTAL 1 MINUTE

## 2024-04-05 PROCEDURE — 2500000004 HC RX 250 GENERAL PHARMACY W/ HCPCS (ALT 636 FOR OP/ED): Performed by: INTERNAL MEDICINE

## 2024-04-05 PROCEDURE — 3700000001 HC GENERAL ANESTHESIA TIME - INITIAL BASE CHARGE

## 2024-04-05 PROCEDURE — 7100000009 HC PHASE TWO TIME - INITIAL BASE CHARGE

## 2024-04-05 PROCEDURE — 45385 COLONOSCOPY W/LESION REMOVAL: CPT | Performed by: INTERNAL MEDICINE

## 2024-04-05 PROCEDURE — 45381 COLONOSCOPY SUBMUCOUS NJX: CPT | Performed by: INTERNAL MEDICINE

## 2024-04-05 PROCEDURE — A45385 PR COLONOSCOPY,REMV LESN,SNARE: Performed by: ANESTHESIOLOGY

## 2024-04-05 PROCEDURE — 88305 TISSUE EXAM BY PATHOLOGIST: CPT | Performed by: PATHOLOGY

## 2024-04-05 PROCEDURE — 7100000010 HC PHASE TWO TIME - EACH INCREMENTAL 1 MINUTE

## 2024-04-05 RX ORDER — SODIUM CHLORIDE, SODIUM LACTATE, POTASSIUM CHLORIDE, CALCIUM CHLORIDE 600; 310; 30; 20 MG/100ML; MG/100ML; MG/100ML; MG/100ML
INJECTION, SOLUTION INTRAVENOUS CONTINUOUS PRN
Status: DISCONTINUED | OUTPATIENT
Start: 2024-04-05 | End: 2024-04-05

## 2024-04-05 RX ORDER — MIDAZOLAM HYDROCHLORIDE 1 MG/ML
INJECTION, SOLUTION INTRAMUSCULAR; INTRAVENOUS AS NEEDED
Status: DISCONTINUED | OUTPATIENT
Start: 2024-04-05 | End: 2024-04-05

## 2024-04-05 RX ORDER — FENTANYL CITRATE 50 UG/ML
INJECTION, SOLUTION INTRAMUSCULAR; INTRAVENOUS AS NEEDED
Status: DISCONTINUED | OUTPATIENT
Start: 2024-04-05 | End: 2024-04-05

## 2024-04-05 RX ORDER — MIDAZOLAM HYDROCHLORIDE 1 MG/ML
INJECTION INTRAMUSCULAR; INTRAVENOUS AS NEEDED
Status: DISCONTINUED | OUTPATIENT
Start: 2024-04-05 | End: 2024-04-05

## 2024-04-05 RX ORDER — EPINEPHRINE 0.1 MG/ML
INJECTION INTRACARDIAC; INTRAVENOUS AS NEEDED
Status: COMPLETED | OUTPATIENT
Start: 2024-04-05 | End: 2024-04-05

## 2024-04-05 RX ADMIN — MIDAZOLAM HYDROCHLORIDE 2 MG: 1 INJECTION INTRAMUSCULAR; INTRAVENOUS at 09:42

## 2024-04-05 RX ADMIN — SODIUM CHLORIDE, POTASSIUM CHLORIDE, SODIUM LACTATE AND CALCIUM CHLORIDE: 600; 310; 30; 20 INJECTION, SOLUTION INTRAVENOUS at 09:43

## 2024-04-05 RX ADMIN — FENTANYL CITRATE 50 MCG: 50 INJECTION, SOLUTION INTRAMUSCULAR; INTRAVENOUS at 10:22

## 2024-04-05 RX ADMIN — FENTANYL CITRATE 50 MCG: 50 INJECTION, SOLUTION INTRAMUSCULAR; INTRAVENOUS at 10:26

## 2024-04-05 RX ADMIN — FENTANYL CITRATE 50 MCG: 50 INJECTION, SOLUTION INTRAMUSCULAR; INTRAVENOUS at 10:00

## 2024-04-05 RX ADMIN — FENTANYL CITRATE 50 MCG: 50 INJECTION, SOLUTION INTRAMUSCULAR; INTRAVENOUS at 09:54

## 2024-04-05 RX ADMIN — MIDAZOLAM HYDROCHLORIDE 1 MG: 1 INJECTION INTRAMUSCULAR; INTRAVENOUS at 10:03

## 2024-04-05 RX ADMIN — MIDAZOLAM HYDROCHLORIDE 1 MG: 1 INJECTION INTRAMUSCULAR; INTRAVENOUS at 10:04

## 2024-04-05 RX ADMIN — EPINEPHRINE 0.05 MG: 0.1 INJECTION, SOLUTION ENDOTRACHEAL; INTRACARDIAC; INTRAVENOUS at 10:54

## 2024-04-05 SDOH — HEALTH STABILITY: MENTAL HEALTH: CURRENT SMOKER: 0

## 2024-04-05 ASSESSMENT — PAIN - FUNCTIONAL ASSESSMENT
PAIN_FUNCTIONAL_ASSESSMENT: 0-10

## 2024-04-05 ASSESSMENT — PAIN SCALES - GENERAL
PAIN_LEVEL: 4
PAINLEVEL_OUTOF10: 0 - NO PAIN

## 2024-04-05 ASSESSMENT — COLUMBIA-SUICIDE SEVERITY RATING SCALE - C-SSRS
6. HAVE YOU EVER DONE ANYTHING, STARTED TO DO ANYTHING, OR PREPARED TO DO ANYTHING TO END YOUR LIFE?: NO
2. HAVE YOU ACTUALLY HAD ANY THOUGHTS OF KILLING YOURSELF?: NO
1. IN THE PAST MONTH, HAVE YOU WISHED YOU WERE DEAD OR WISHED YOU COULD GO TO SLEEP AND NOT WAKE UP?: NO

## 2024-04-05 NOTE — ANESTHESIA PREPROCEDURE EVALUATION
Patient: Eligio Hogue    Procedure Information       Date/Time: 04/05/24 0950    Scheduled providers: Angie Bill MD; Frank Bey MD; MELBA Rene-HUYEN; Angie Connor RN    Procedure: COLONOSCOPY    Location: [facility]            Relevant Problems   No relevant active problems       Clinical information reviewed:   Tobacco  Allergies  Meds   Med Hx  Surg Hx   Fam Hx  Soc Hx        NPO Detail:  NPO/Void Status  Carbohydrate Drink Given Prior to Surgery? : N  Date of Last Liquid: 04/05/24  Time of Last Liquid: 0100  Date of Last Solid: 04/04/24  Time of Last Solid: 0800  Last Intake Type: Clear fluids  Time of Last Void: 0850         Physical Exam    Airway  Mallampati: I  TM distance: >3 FB  Neck ROM: full     Cardiovascular - normal exam     Dental - normal exam     Pulmonary - normal exam     Abdominal - normal exam             Anesthesia Plan    History of general anesthesia?: yes  History of complications of general anesthesia?: no    ASA 2     MAC     The patient is not a current smoker.  Patient was not previously instructed to abstain from smoking on day of procedure.  Patient did not smoke on day of procedure.    intravenous induction   Postoperative administration of opioids is intended.  Anesthetic plan and risks discussed with patient.  Use of blood products discussed with patient who.    Plan discussed with CRNA.

## 2024-04-05 NOTE — H&P
"History Of Present Illness  Eligio Hogue is a 64 y.o. male presenting with cecal polyp.     Past Medical History  Past Medical History:   Diagnosis Date    A-fib (CMS/HCC)     DM2 (diabetes mellitus, type 2) (CMS/[facility])     Other chest pain 02/16/2021    Chest tightness or pressure    Pain in right foot 04/22/2016    Acute foot pain, right    Personal history of other benign neoplasm     History of benign neoplasm of pharynx    Personal history of other diseases of the respiratory system     History of chronic obstructive lung disease    Personal history of other endocrine, nutritional and metabolic disease     History of thyroid disorder    Rash and other nonspecific skin eruption 02/16/2021    Skin rash     Surgical History  Past Surgical History:   Procedure Laterality Date    OTHER SURGICAL HISTORY  03/10/2016    Excision Of Tracheal Tumor    OTHER SURGICAL HISTORY  08/09/2018    Oral Surgery Tooth Extraction [facility]     Social History  He reports that he has quit smoking. His smoking use included cigarettes. He has never used smokeless tobacco. He reports current alcohol use. He reports that he does not currently use drugs.    Family History  Family History   Problem Relation Name Age of Onset    Heart disease Mother      Diabetes Brother          Allergies  Allergies   Allergen Reactions    Penicillins Other     Blisters in mouth      Omeprazole Rash     Review of Systems     Physical Exam     Last Recorded Vitals  Blood pressure 174/77, pulse 69, temperature 36.6 °C (97.9 °F), temperature source Temporal, resp. rate 16, height 1.905 m (6' 3\"), weight (!) 161 kg (355 lb 13.2 oz), SpO2 96 %.    Assessment/Plan   colonosopy     Angie Bill MD  " abnormal

## 2024-04-05 NOTE — SIGNIFICANT EVENT
Discharge teaching completed with pt and wife with good understanding;.   Physical assessment unchanged

## 2024-04-05 NOTE — ANESTHESIA POSTPROCEDURE EVALUATION
Patient: Eligio Hogue    Procedure Summary       Date: 04/05/24 Room / Location: [facility]    Anesthesia Start: 0943 Anesthesia Stop: 1100    Procedure: COLONOSCOPY Diagnosis: Colon adenoma    Scheduled Providers: Angie Bill MD; Frank Bey MD; MELBA Rene-HUYEN; Angie Connor RN Responsible Provider: Frank Bey MD    Anesthesia Type: MAC ASA Status: 2            Anesthesia Type: MAC    Vitals Value Taken Time   /71 04/05/24 1115   Temp 36.3 °C (97.3 °F) 04/05/24 1058   Pulse 59 04/05/24 1115   Resp 18 04/05/24 1115   SpO2 95 % 04/05/24 1115       Anesthesia Post Evaluation    Patient location during evaluation: PACU  Patient participation: complete - patient participated  Level of consciousness: awake and alert  Pain score: 4  Pain management: adequate  Airway patency: patent  Cardiovascular status: acceptable  Respiratory status: acceptable  Hydration status: acceptable  Postoperative Nausea and Vomiting: none        No notable events documented.

## 2024-04-08 ASSESSMENT — PAIN SCALES - GENERAL: PAINLEVEL_OUTOF10: 0 - NO PAIN

## 2024-04-08 NOTE — ADDENDUM NOTE
Encounter addended by: Angie Connor RN on: 4/8/2024 11:53 AM   Actions taken: Contacts section saved, Flowsheet macro applied, Flowsheet accepted

## 2024-04-24 DIAGNOSIS — E78.5 HYPERLIPIDEMIA, UNSPECIFIED: ICD-10-CM

## 2024-04-24 RX ORDER — ROSUVASTATIN CALCIUM 20 MG/1
20 TABLET, COATED ORAL DAILY
Qty: 90 TABLET | Refills: 0 | Status: SHIPPED | OUTPATIENT
Start: 2024-04-24

## 2024-06-07 ENCOUNTER — LAB (OUTPATIENT)
Dept: LAB | Facility: LAB | Age: 64
End: 2024-06-07
Payer: COMMERCIAL

## 2024-06-07 DIAGNOSIS — E11.69 TYPE 2 DIABETES MELLITUS WITH OTHER SPECIFIED COMPLICATION, WITHOUT LONG-TERM CURRENT USE OF INSULIN (MULTI): ICD-10-CM

## 2024-06-07 LAB
CREAT UR-MCNC: 138.1 MG/DL (ref 20–370)
MICROALBUMIN UR-MCNC: 11.6 MG/L
MICROALBUMIN/CREAT UR: 8.4 UG/MG CREAT

## 2024-06-07 PROCEDURE — 82043 UR ALBUMIN QUANTITATIVE: CPT

## 2024-06-07 PROCEDURE — 82570 ASSAY OF URINE CREATININE: CPT

## 2024-06-10 PROBLEM — I48.92 ATRIAL FLUTTER (MULTI): Status: ACTIVE | Noted: 2023-09-26

## 2024-06-10 PROBLEM — E78.5 HYPERLIPIDEMIA: Status: ACTIVE | Noted: 2024-06-10

## 2024-06-10 PROBLEM — K21.9 GERD (GASTROESOPHAGEAL REFLUX DISEASE): Status: ACTIVE | Noted: 2024-06-10

## 2024-06-10 PROBLEM — K63.5 POLYP OF COLON: Status: ACTIVE | Noted: 2024-06-10

## 2024-06-10 PROBLEM — M92.529 OSGOOD-SCHLATTER'S DISEASE: Status: ACTIVE | Noted: 2024-06-10

## 2024-06-10 PROBLEM — E11.9 TYPE 2 DIABETES MELLITUS (MULTI): Status: ACTIVE | Noted: 2024-06-10

## 2024-06-10 PROBLEM — R40.0 DAYTIME SLEEPINESS: Status: ACTIVE | Noted: 2024-06-10

## 2024-06-10 PROBLEM — I10 BENIGN ESSENTIAL HYPERTENSION: Status: ACTIVE | Noted: 2024-06-10

## 2024-06-10 PROBLEM — Z86.39 HISTORY OF THYROID DISORDER: Status: ACTIVE | Noted: 2024-06-10

## 2024-06-10 PROBLEM — I48.0 PAROXYSMAL ATRIAL FIBRILLATION (MULTI): Status: ACTIVE | Noted: 2024-06-10

## 2024-06-10 PROBLEM — M71.30 SYNOVIAL CYST: Status: ACTIVE | Noted: 2024-06-10

## 2024-06-10 PROBLEM — E66.01 MORBID OBESITY (MULTI): Status: ACTIVE | Noted: 2024-06-10

## 2024-06-10 PROBLEM — E03.9 HYPOTHYROIDISM: Status: ACTIVE | Noted: 2024-06-10

## 2024-06-10 PROBLEM — M19.072 PRIMARY OSTEOARTHRITIS OF BOTH FEET: Status: ACTIVE | Noted: 2024-06-10

## 2024-06-10 PROBLEM — M19.071 PRIMARY OSTEOARTHRITIS OF BOTH FEET: Status: ACTIVE | Noted: 2024-06-10

## 2024-06-13 ENCOUNTER — OFFICE VISIT (OUTPATIENT)
Dept: SLEEP MEDICINE | Facility: CLINIC | Age: 64
End: 2024-06-13
Payer: COMMERCIAL

## 2024-06-13 VITALS
WEIGHT: 315 LBS | OXYGEN SATURATION: 96 % | DIASTOLIC BLOOD PRESSURE: 89 MMHG | HEART RATE: 59 BPM | HEIGHT: 75 IN | BODY MASS INDEX: 39.17 KG/M2 | SYSTOLIC BLOOD PRESSURE: 151 MMHG

## 2024-06-13 DIAGNOSIS — R29.818 SUSPECTED SLEEP APNEA: ICD-10-CM

## 2024-06-13 PROCEDURE — G2211 COMPLEX E/M VISIT ADD ON: HCPCS | Performed by: NURSE PRACTITIONER

## 2024-06-13 PROCEDURE — 3008F BODY MASS INDEX DOCD: CPT | Performed by: NURSE PRACTITIONER

## 2024-06-13 PROCEDURE — 99214 OFFICE O/P EST MOD 30 MIN: CPT | Performed by: NURSE PRACTITIONER

## 2024-06-13 PROCEDURE — 1159F MED LIST DOCD IN RCRD: CPT | Performed by: NURSE PRACTITIONER

## 2024-06-13 PROCEDURE — 3079F DIAST BP 80-89 MM HG: CPT | Performed by: NURSE PRACTITIONER

## 2024-06-13 PROCEDURE — 1160F RVW MEDS BY RX/DR IN RCRD: CPT | Performed by: NURSE PRACTITIONER

## 2024-06-13 PROCEDURE — 99204 OFFICE O/P NEW MOD 45 MIN: CPT | Performed by: NURSE PRACTITIONER

## 2024-06-13 PROCEDURE — 1126F AMNT PAIN NOTED NONE PRSNT: CPT | Performed by: NURSE PRACTITIONER

## 2024-06-13 PROCEDURE — 4010F ACE/ARB THERAPY RXD/TAKEN: CPT | Performed by: NURSE PRACTITIONER

## 2024-06-13 PROCEDURE — 3077F SYST BP >= 140 MM HG: CPT | Performed by: NURSE PRACTITIONER

## 2024-06-13 PROCEDURE — 3061F NEG MICROALBUMINURIA REV: CPT | Performed by: NURSE PRACTITIONER

## 2024-06-13 ASSESSMENT — PATIENT HEALTH QUESTIONNAIRE - PHQ9
2. FEELING DOWN, DEPRESSED OR HOPELESS: NOT AT ALL
1. LITTLE INTEREST OR PLEASURE IN DOING THINGS: NOT AT ALL
SUM OF ALL RESPONSES TO PHQ9 QUESTIONS 1 AND 2: 0

## 2024-06-13 ASSESSMENT — ENCOUNTER SYMPTOMS
OCCASIONAL FEELINGS OF UNSTEADINESS: 0
LOSS OF SENSATION IN FEET: 0
DEPRESSION: 0

## 2024-06-13 ASSESSMENT — PAIN SCALES - GENERAL: PAINLEVEL: 0-NO PAIN

## 2024-06-13 NOTE — PATIENT INSTRUCTIONS
[facility] Sleep Medicine  [facility] [address]  [facility]  [address]  [facility] [address]       NAME: Eligio Hogue   DATE:  06/13/24    DIAGNOSIS:   1. Suspected sleep apnea  Referral to Adult Sleep Medicine    Home sleep apnea test (HSAT)          Thank you for coming to the Sleep Medicine Clinic today! Your sleep medicine provider today was: Autumn Rodriguez, MELBA-CNP Below is a summary of your treatment plan, other important information, and our contact numbers:    TREATMENT PLAN:   - Follow-up in 3-4 months.  - If not already done, sign up for 'My Chart' and send prescription requests or messages through this      Scheduling a Sleep Study    Call the  Sleep Testing Center to speak with a sleep testing  to book your overnight sleep study procedure at one of our adult and pediatric-friendly sleep labs. Overnight sleep studies may be scheduled on a weekday or weekend.    We have child life services on a case by case basis at the [facility] location. We also perform daytime testing for shift workers on a case by case basis.    Locations for sleep studies are: [facility], [facility] ([facility]), [facility], [facility], [place], [place].    Bring your usual medications and nightly routine items for your sleep study. In order to fall asleep faster in sleep lab, we advise patients to wake up earlier on the morning of the scheduled testing and avoid napping prior to testing. Sometimes, your provider may prescribe a sleep aid to be taken at lights out in the sleep lab. If you are taking a sleep aid, please have somebody pick you up after the sleep testing.    Results of your sleep study will be given to the ordering clinician. Please contact their office for results or follow up as directed by your clinician.    For additional information about the sleep medicine services, please call [phone]        Instructions - Common FLOYD Recs: - For your sleep apnea, continue to use your PAP every night and use it whenever you are sleeping.   - Avoid alcohol or sedatives several hours prior to sleeping.   - Get additional supplies for your PAP (e.g., mask, hose, filters) every 3 months or as your insurance allows from your [employer] company. Replacement cushions for your PAP mask can be requested monthly if airseals are an issue.  - Remember to clean your mask, tubings, and water chamber regularly as instructed.  - Avoid driving or operating heavy machinery when drowsy. A person driving while sleepy is five (5) times more likely to have an accident. If you feel sleepy, pull over and take a short power nap (sleep for less than 30 minutes). Otherwise, ask somebody to drive you.    EASY WAYS TO IMPROVE YOUR SLEEP:  1. Go to bed and wake up at the same time every day.   Aim for 8 hours but some people need less, some need more.   Get out of bed if you are not sleeping.   Limit naps to 20 min or less.   2. Expose yourself to daylight and/ or bright light in the morning.   Go outside or spend time near a window each morning.   You can use a light box (found on [employer]) if you wake before the sunrise.   Limit light exposure in the evenings (including electronic usage).   Try meditation, reading, stretching, deep breathing, warm shower or bath, or yoga nidra as part of your bedtime routine. There are many great FREE, videos or audio tracks on [employer]/ [employer], etc for guidance.  3. Exercise, in some form, EVERY day, but not too close to bedtime. Consider making this part of your routine at the start of your day, followed by a cool shower.  4. Eat meals at roughly the same time every day. Make sure you are prioritizing fruits, vegetables, whole grains, lean proteins.  5. Time your caffeine intake. Make sure you are not drinking caffeine within 8 to 12 hours prior to your bedtime.   6. Avoid marijuana, alcohol, and nicotine. They will  reduce sleep quality in any quantity.  7. Learn to manage anxiety. Psychology services at  can be reached at [phone] to schedule an appointment.     IMPORTANT INFORMATION:     Call 911 for medical emergencies.  Our offices are generally open from Monday-Friday, 9 am - 5 pm.  If you need to get in touch with me, you may either call me and my team(number is below) or you can use [employer].  If a referral for a test, for CPAP, or for another specialist was made, and you have not heard about scheduling this within a week, please call scheduling at [phone]).  If you are unable to make your appointment for clinic or an overnight study, kindly call the office at least 48 hours in advance to cancel and reschedule.  If you are on CPAP, please bring your device's card to each clinic appointment unless told otherwise by your provider.  There are no supporting services by either the sleep doctors or their staff on weekends and Holidays, or after 5 PM on weekdays.   If you have been asked to come to a sleep study, make sure you bring toiletries, a comfy pillow, and any nighttime medications that you may regularly take. Also be sure to eat dinner before you arrive. We generally do not provide meals.      PRESCRIPTIONS:  We require 7 days advanced notice for prescription refills. If we do not receive the request in this time, we cannot guarantee that your medication will be refilled in time. Please contact the sleep nurses listed below for refills or request via [employer].     IMPORTANT PHONE NUMBERS:   Sleep Medicine Clinic Fax: [phone]  Appointments (for Pediatric Sleep Clinic): [phone] - option 1  Appointments (for Adult Sleep Clinic): [phone] - option 2  Appointments (For Sleep Studies): [phone] - option 3  Behavioral Sleep Medicine: [phone]  Sleep Surgery: [phone]  ENT (Otolaryngology): [phone]  Headache Clinic (Neurology): [phone]  Neurology:  [phone]  Psychiatry: [phone]  Pulmonary Function Testing (PFT) Center: [phone]  Pulmonary Medicine: [phone]  [employer] (DME): [phone]  [employer] (DME): [phone]  [facility] ([facility]): [phone]    Our Adult Sleep Medicine Team (Please do not hesitate to call the office or sleep nurse with any questions between appointments):    Adult Sleep Nurse (Caitlin Duarte, BRAD):  For clinical questions and refilling prescriptions: [phone]  Email sleep diaries and other documents at: [email or web address]    Adult Sleep Medicine Secretaries:  Farnaz Humphries (For Inocencio/Keshia/Emilia/Marko/Jet/Richi):   P: [phone]  F: [phone]  Geraldine Vargas (For Garcia/Jennifer): P: [phone]  Fax: [phone]  Shruti Nunes (For Jurneelima/Blank): P: [phone]  F: [phone]  Linnea Hahn (For [place]): P: [phone]  F: [phone]  Irma Marinelli (For Ivy/Kenna/Zakhary): P: [phone]  F: [phone]  Staci Ayala (For Narciso/Santos): P: [phone]  F: [phone]     Adult Sleep Medicine Advanced Practice Providers:  Jeff Bridges ([place], [place])  Lidya Pierson ([facility])  Autumn Rodriguez CNP ([place], [place], [place])  [place]ena Nieto CNP ([place], [place], [place])  Hannah Troy ([place], [place], [place])  Esteban Graham CNP ([facility])      Our Sleep Testing Center (STC) Locations:  Our team will contact you to schedule your sleep study, however, you can contact us as follow:  Main Phone Line (scheduling only): [phone]), option 3  Adult and Pediatric Locations  [facility] (6 years and older): [facility] - 4th floor ([address] [place] [address]) After hours line: [phone]  [facility] (Main campus: All ages): [facility], 6th floor. After hours line: [phone]  [facility] (5 years and older; younger considered on  "case-by-case basis): [address]; [address], [address]. Scheduling  After hours line: [phone]   [place] (6 years and older): [address]; [address]; [address]   Nuha (6 years and older): [address] [facility], [address]  After hours line: [phone]   Deon (13 years and older) in [place]: [address], 2nd floor  After hours line: [phone]   Chana (13 year and older): [address]  After hours line: [phone] (Home studies out of [facility])    Adult Only Locations:   Katie (18 years and older): [address] [facility], 2nd floor   Angelina (18 years and older): [address] [facility]; 4th floor  After hours line: [phone]  [facility] (18 years and older) at [place]: [address] [place] [address]  After hours line: [phone]        CONTACTING YOUR SLEEP MEDICINE PROVIDER:  Send a message directly to your provider through \"My Chart\", which is the email service through your  Records Account: [email or web address]   Call [phone] and leave a message. One of the administrative assistants will forward the message to your sleep medicine provider through \"My Chart\" and/or email.     Your sleep medicine provider for this visit was: Autumn Rodriguez, MELBA-CNP    In the event that you are running more than 15 minutes late to your appointment, I will kindly ask you to reschedule.       "

## 2024-06-13 NOTE — PROGRESS NOTES
Patient: Eligio Hogue    [id number]  : 1960 -- AGE 64 y.o.    Provider: ADALBERTO Weiss     Location [facility]   Service Date: 2024              [facility] Sleep Medicine Clinic  New Visit Note      HISTORY OF PRESENT ILLNESS     The patient's referring provider is: Vernon Estrada MD    HISTORY OF PRESENT ILLNESS   Eligio Hogue is a 64 y.o. male who presents to a [facility] Sleep Medicine Clinic for a sleep medicine evaluation with concerns of suspected FLOYD in presence of afib, smart watch tracking low oxygen at night. Works in IT.    The patient has h/o afib, HTN, hyperlipidemia, hypothyroidism, DM2, GERD, arthritis.     Past Sleep History  Patient has the following sleep-related diagnoses: no previous testing    Current History    On today's visit, the patient reports recommendation for visit per PCP via cardiologist. States he is meeting with Dr. Hernandez for new visit soon and he did not care for previous provider. Started metoprolol but has not started Eliquis. States he was diagnosed with afib and wore holter monitor. Purchased smart watch after to continue tracking. Notes episodes of afib at night per watch. Records some instances of low oxygen at night as well. Reviewed [employer] fatou today in clinic.   Feels sleepy during the day. Notes he is the only one at work on Sundays and often dozes off at his desk.     Sleep schedule  on weekdays / work days:  Usual Bedtime:    11:30 pm  Falls asleep around:  quickly  # of awakenings:   Wake time:  5-6    Naps: none     Preferred sleeping position: side    Sleep-related ROS:    Problems going to sleep: No problems going to sleep    Sleep Maintenance: denies problematic awakenings  Problems staying asleep Problems Staying Asleep: breathing problems    Breathing during sleep: snoring, snorting during sleep, witnessed apneas, and night sweats    RLS screen:  RLSSCREEN: - Sensations: Patient has  unusual sensations in their extremities that cause an urge to move them  Notes aches in in his legs at times, able to stretch out on the couch and helps     Sleep-related behaviors:   DENIES    Daytime Symptoms  On awakening patient reports: wake unrefreshed, feels sleepy, morning headaches, morning dry mouth, and morning sore throat    ESS: No data recorded   MEHRDAD:    FOSQ:  35      REVIEW OF SYSTEMS     REVIEW OF SYSTEMS  Review of Systems   All other systems reviewed and are negative.      ALLERGIES AND MEDICATIONS     ALLERGIES  Allergies   Allergen Reactions    Penicillins Other     Blisters in mouth      Omeprazole Rash       MEDICATIONS  Current Outpatient Medications   Medication Sig Dispense Refill    losartan (Cozaar) 50 mg tablet Take 1 tablet (50 mg) by mouth once daily. 90 tablet 1    metFORMIN  mg 24 hr tablet TAKE 1 TABLET BY MOUTH TWICE A DAY WITH MEALS 180 tablet 1    metoprolol tartrate (Lopressor) 25 mg tablet Take 1 tablet (25 mg) by mouth 2 times a day. 180 tablet 1    rosuvastatin (Crestor) 20 mg tablet TAKE 1 TABLET BY MOUTH EVERY DAY 90 tablet 0    apixaban (Eliquis) 5 mg tablet Take by mouth.       No current facility-administered medications for this visit.         PAST HISTORY     PAST MEDICAL HISTORY  Past Medical History:   Diagnosis Date    A-fib (Multi)     DM2 (diabetes mellitus, type 2) (Multi)     Other chest pain 02/16/2021    Chest tightness or pressure    Pain in right foot 04/22/2016    Acute foot pain, right    Personal history of other benign neoplasm     History of benign neoplasm of pharynx    Personal history of other diseases of the respiratory system     History of chronic obstructive lung disease    Personal history of other endocrine, nutritional and metabolic disease     History of thyroid disorder    Rash and other nonspecific skin eruption 02/16/2021    Skin rash       PAST SURGICAL HISTORY:  Past Surgical History:   Procedure Laterality Date    OTHER SURGICAL  "HISTORY  03/10/2016    Excision Of Tracheal Tumor    OTHER SURGICAL HISTORY  08/09/2018    Oral Surgery Tooth Extraction [place] [facility]       FAMILY HISTORY  Family History   Problem Relation Name Age of Onset    Heart disease Mother      Diabetes Brother         DOES/DOES NOT EC: does not have a family history of sleep disorder. Not aware       SOCIAL HISTORY  He  reports that he has quit smoking. His smoking use included cigarettes. He has never used smokeless tobacco. He reports current alcohol use. He reports that he does not currently use drugs. He currently lives with his wife.       PHYSICAL EXAM     VITAL SIGNS: /89 (BP Location: Left arm, Patient Position: Sitting, BP Cuff Size: Large adult)   Pulse 59   Ht 1.905 m (6' 3\")   Wt (!) 165 kg (363 lb)   SpO2 96%   BMI 45.37 kg/m²      PREVIOUS WEIGHTS:  Wt Readings from Last 3 Encounters:   06/13/24 (!) 165 kg (363 lb)   04/05/24 (!) 161 kg (355 lb 13.2 oz)   12/18/23 150 kg (330 lb)       Physical Exam  Physical Exam   Constitutional: Alert and oriented, cooperative, no obvious distress   HEENT: Non icteric or anemic, EOM WNL bilaterally     Upper Airway Examination:   Modified Mallampati Class: 2  OP Lateral wall narrowing rdgrdrrdarddrderd:rd rd3rd Tonsil Grade: none  No high arched palate  Tongue Scalloping: Y -- mild  Retrognathia: N  Overjet: N      Neck: Supple, no JVD, no goiter, no adenopathy  Chest: CTA bilaterally, no wheezing, crackles, rubs   Cardiac: RRR, S1 and S2, no murmur, rub, thrill   Abdomen: Obese, Soft, nontender, no masses, no organomegaly   Extremities: No clubbing, no LL edema   Neuromuscular: Cranial nerves grossly intact, no focal deficits      RESULTS/DATA     Bicarbonate (mmol/L)   Date Value   09/08/2023 31   08/22/2022 29   01/29/2021 30       ASSESSMENT/PLAN     Mr. Hogue is a 64 y.o. male and He was referred to the [facility] Sleep Medicine Clinic for evaluation of suspected sleep apnea    Problem List and Orders  Problem " List Items Addressed This Visit             ICD-10-CM    Suspected sleep apnea R29.818     Suspicious for FLOYD based on H&P   Will plan for HSAT and PAP set up pending results  Dana was agreeable to the plan today. Will discuss CPAP further when calling with results  Follow up in Oct  Recommended he start eliquis for clot prevention, He verbalized understanding          Relevant Orders    Home sleep apnea test (HSAT)

## 2024-06-14 PROBLEM — R29.818 SUSPECTED SLEEP APNEA: Status: ACTIVE | Noted: 2024-06-14

## 2024-06-14 NOTE — ASSESSMENT & PLAN NOTE
Suspicious for FLOYD based on H&P   Will plan for HSAT and PAP set up pending results  Dana was agreeable to the plan today. Will discuss CPAP further when calling with results  Follow up in Oct  Recommended he start eliquis for clot prevention, He verbalized understanding

## 2024-06-17 ENCOUNTER — LAB (OUTPATIENT)
Dept: LAB | Facility: LAB | Age: 64
End: 2024-06-17
Payer: COMMERCIAL

## 2024-06-17 ENCOUNTER — APPOINTMENT (OUTPATIENT)
Dept: PRIMARY CARE | Facility: CLINIC | Age: 64
End: 2024-06-17
Payer: COMMERCIAL

## 2024-06-17 VITALS — DIASTOLIC BLOOD PRESSURE: 82 MMHG | SYSTOLIC BLOOD PRESSURE: 138 MMHG

## 2024-06-17 DIAGNOSIS — Z12.5 SCREENING FOR PROSTATE CANCER: ICD-10-CM

## 2024-06-17 DIAGNOSIS — Z00.00 HEALTHCARE MAINTENANCE: ICD-10-CM

## 2024-06-17 DIAGNOSIS — E66.01 CLASS 3 SEVERE OBESITY DUE TO EXCESS CALORIES WITH SERIOUS COMORBIDITY AND BODY MASS INDEX (BMI) OF 40.0 TO 44.9 IN ADULT (MULTI): ICD-10-CM

## 2024-06-17 DIAGNOSIS — Z13.220 LIPID SCREENING: ICD-10-CM

## 2024-06-17 DIAGNOSIS — E78.2 MIXED HYPERLIPIDEMIA: ICD-10-CM

## 2024-06-17 DIAGNOSIS — Z00.00 HEALTH CARE MAINTENANCE: ICD-10-CM

## 2024-06-17 DIAGNOSIS — I48.0 PAROXYSMAL ATRIAL FIBRILLATION (MULTI): ICD-10-CM

## 2024-06-17 DIAGNOSIS — R29.818 SUSPECTED SLEEP APNEA: ICD-10-CM

## 2024-06-17 DIAGNOSIS — E11.69 TYPE 2 DIABETES MELLITUS WITH OTHER SPECIFIED COMPLICATION, WITHOUT LONG-TERM CURRENT USE OF INSULIN (MULTI): ICD-10-CM

## 2024-06-17 DIAGNOSIS — I10 PRIMARY HYPERTENSION: ICD-10-CM

## 2024-06-17 DIAGNOSIS — R79.89 ELEVATED TSH: Primary | ICD-10-CM

## 2024-06-17 LAB
ALBUMIN SERPL BCP-MCNC: 4 G/DL (ref 3.4–5)
ALP SERPL-CCNC: 61 U/L (ref 33–136)
ALT SERPL W P-5'-P-CCNC: 16 U/L (ref 10–52)
ANION GAP SERPL CALC-SCNC: 13 MMOL/L (ref 10–20)
AST SERPL W P-5'-P-CCNC: 14 U/L (ref 9–39)
BILIRUB SERPL-MCNC: 0.6 MG/DL (ref 0–1.2)
BUN SERPL-MCNC: 19 MG/DL (ref 6–23)
CALCIUM SERPL-MCNC: 9.3 MG/DL (ref 8.6–10.6)
CHLORIDE SERPL-SCNC: 104 MMOL/L (ref 98–107)
CHOLEST SERPL-MCNC: 89 MG/DL (ref 0–199)
CHOLESTEROL/HDL RATIO: 2.9
CO2 SERPL-SCNC: 30 MMOL/L (ref 21–32)
CREAT SERPL-MCNC: 1.21 MG/DL (ref 0.5–1.3)
EGFRCR SERPLBLD CKD-EPI 2021: 66 ML/MIN/1.73M*2
ERYTHROCYTE [DISTWIDTH] IN BLOOD BY AUTOMATED COUNT: 13.9 % (ref 11.5–14.5)
EST. AVERAGE GLUCOSE BLD GHB EST-MCNC: 157 MG/DL
GLUCOSE SERPL-MCNC: 164 MG/DL (ref 74–99)
HBA1C MFR BLD: 7.1 %
HCT VFR BLD AUTO: 42.6 % (ref 41–52)
HDLC SERPL-MCNC: 30.3 MG/DL
HGB BLD-MCNC: 13.6 G/DL (ref 13.5–17.5)
LDLC SERPL CALC-MCNC: 21 MG/DL
MCH RBC QN AUTO: 29.3 PG (ref 26–34)
MCHC RBC AUTO-ENTMCNC: 31.9 G/DL (ref 32–36)
MCV RBC AUTO: 92 FL (ref 80–100)
NON HDL CHOLESTEROL: 59 MG/DL (ref 0–149)
NRBC BLD-RTO: 0 /100 WBCS (ref 0–0)
PLATELET # BLD AUTO: 284 X10*3/UL (ref 150–450)
POTASSIUM SERPL-SCNC: 4.8 MMOL/L (ref 3.5–5.3)
PROT SERPL-MCNC: 6 G/DL (ref 6.4–8.2)
PSA SERPL-MCNC: 2.19 NG/ML
RBC # BLD AUTO: 4.64 X10*6/UL (ref 4.5–5.9)
SODIUM SERPL-SCNC: 142 MMOL/L (ref 136–145)
T4 FREE SERPL-MCNC: 0.98 NG/DL (ref 0.78–1.48)
TRIGL SERPL-MCNC: 187 MG/DL (ref 0–149)
TSH SERPL-ACNC: 5.43 MIU/L (ref 0.44–3.98)
VLDL: 37 MG/DL (ref 0–40)
WBC # BLD AUTO: 8.3 X10*3/UL (ref 4.4–11.3)

## 2024-06-17 PROCEDURE — 84443 ASSAY THYROID STIM HORMONE: CPT

## 2024-06-17 PROCEDURE — 80053 COMPREHEN METABOLIC PANEL: CPT

## 2024-06-17 PROCEDURE — 36415 COLL VENOUS BLD VENIPUNCTURE: CPT

## 2024-06-17 PROCEDURE — 85027 COMPLETE CBC AUTOMATED: CPT

## 2024-06-17 PROCEDURE — 99214 OFFICE O/P EST MOD 30 MIN: CPT | Performed by: STUDENT IN AN ORGANIZED HEALTH CARE EDUCATION/TRAINING PROGRAM

## 2024-06-17 PROCEDURE — 84439 ASSAY OF FREE THYROXINE: CPT

## 2024-06-17 PROCEDURE — 83036 HEMOGLOBIN GLYCOSYLATED A1C: CPT

## 2024-06-17 PROCEDURE — 3079F DIAST BP 80-89 MM HG: CPT | Performed by: STUDENT IN AN ORGANIZED HEALTH CARE EDUCATION/TRAINING PROGRAM

## 2024-06-17 PROCEDURE — 3008F BODY MASS INDEX DOCD: CPT | Performed by: STUDENT IN AN ORGANIZED HEALTH CARE EDUCATION/TRAINING PROGRAM

## 2024-06-17 PROCEDURE — 3075F SYST BP GE 130 - 139MM HG: CPT | Performed by: STUDENT IN AN ORGANIZED HEALTH CARE EDUCATION/TRAINING PROGRAM

## 2024-06-17 PROCEDURE — 3061F NEG MICROALBUMINURIA REV: CPT | Performed by: STUDENT IN AN ORGANIZED HEALTH CARE EDUCATION/TRAINING PROGRAM

## 2024-06-17 PROCEDURE — 4010F ACE/ARB THERAPY RXD/TAKEN: CPT | Performed by: STUDENT IN AN ORGANIZED HEALTH CARE EDUCATION/TRAINING PROGRAM

## 2024-06-17 PROCEDURE — 80061 LIPID PANEL: CPT

## 2024-06-17 PROCEDURE — 84153 ASSAY OF PSA TOTAL: CPT

## 2024-06-17 RX ORDER — METFORMIN HYDROCHLORIDE 500 MG/1
500 TABLET, EXTENDED RELEASE ORAL
Qty: 180 TABLET | Refills: 1 | Status: SHIPPED | OUTPATIENT
Start: 2024-06-17

## 2024-06-17 RX ORDER — METOPROLOL TARTRATE 25 MG/1
25 TABLET, FILM COATED ORAL 2 TIMES DAILY
Qty: 180 TABLET | Refills: 1 | Status: SHIPPED | OUTPATIENT
Start: 2024-06-17

## 2024-06-17 NOTE — PROGRESS NOTES
Subjective   Patient ID: Eligio Hogue is a 64 y.o. male who presents for Follow-up (Med refill).    HPI   Routine fu.  Med refill.  He feels fatigued, recently saw sleep medicine, has home sleep study pending.    Taking medications as prescribed.    He is going to establish care with a new cardiologist.  Review of Systems  12-point ROS reviewed and was negative unless otherwise noted in HPI.    Objective   /82     Physical Exam  GEN: conversant, NAD  HEENT: PER, EOMI, MMM  NECK: supple, full  CV: S1, S2, RRR  PULM: CTAB  ABD: soft, NT, obese  NEURO: no new gross focal deficits  EXT: no sig LE edema  PSYCH: appropriate affect    Assessment/Plan     #fatigue/Severe obesity, class 3: strongly suspected FLOYD. He is seeing sleep medicine for suspected FLOYD. Home sleep study is pending. Advised increased efforts at diet, exercise, weight loss.      #HTN: continue losartan, check CMP     #HLD: continue statin, check lipids     #DM2: advised increasing metformin dose, patient declines. Check A1c.     #Afib: He had been seeing Dr. Garsia from cardiology. Had stress test, echo, event monitor. Also had recent diagnosis of Afib, AC has been recommended, patient has so far declined. He has upcoming appointment with Dr. Hernandez to establish care.     #Elevated TSH: monitor     #Health maintenance: colonoscopy 6 mo fu due 10/2024. He says that he has had AAA screening. Advised Shingrix, yearly flu shot. Pneumovax given 2023. Routine labs.     RTC 3 mo

## 2024-07-03 ENCOUNTER — OFFICE VISIT (OUTPATIENT)
Dept: CARDIOLOGY | Facility: HOSPITAL | Age: 64
End: 2024-07-03
Payer: COMMERCIAL

## 2024-07-03 ENCOUNTER — LAB (OUTPATIENT)
Dept: LAB | Facility: LAB | Age: 64
End: 2024-07-03
Payer: COMMERCIAL

## 2024-07-03 VITALS
BODY MASS INDEX: 39.17 KG/M2 | OXYGEN SATURATION: 95 % | WEIGHT: 315 LBS | DIASTOLIC BLOOD PRESSURE: 78 MMHG | HEIGHT: 75 IN | HEART RATE: 65 BPM | SYSTOLIC BLOOD PRESSURE: 118 MMHG

## 2024-07-03 DIAGNOSIS — I10 BENIGN ESSENTIAL HYPERTENSION: ICD-10-CM

## 2024-07-03 DIAGNOSIS — E78.00 PURE HYPERCHOLESTEROLEMIA: ICD-10-CM

## 2024-07-03 DIAGNOSIS — I48.0 PAROXYSMAL ATRIAL FIBRILLATION (MULTI): ICD-10-CM

## 2024-07-03 DIAGNOSIS — E11.9 TYPE 2 DIABETES MELLITUS WITHOUT COMPLICATION, WITHOUT LONG-TERM CURRENT USE OF INSULIN (MULTI): ICD-10-CM

## 2024-07-03 DIAGNOSIS — I71.21 ANEURYSM OF ASCENDING AORTA WITHOUT RUPTURE (CMS-HCC): ICD-10-CM

## 2024-07-03 DIAGNOSIS — E66.01 MORBID OBESITY (MULTI): ICD-10-CM

## 2024-07-03 DIAGNOSIS — I48.0 PAROXYSMAL ATRIAL FIBRILLATION (MULTI): Primary | ICD-10-CM

## 2024-07-03 LAB
ATRIAL RATE: 326 BPM
D DIMER PPP FEU-MCNC: 417 NG/ML FEU
Q ONSET: 222 MS
QRS COUNT: 9 BEATS
QRS DURATION: 76 MS
QT INTERVAL: 378 MS
QTC CALCULATION(BAZETT): 361 MS
QTC FREDERICIA: 367 MS
R AXIS: 3 DEGREES
T AXIS: 28 DEGREES
T OFFSET: 411 MS
VENTRICULAR RATE: 55 BPM

## 2024-07-03 PROCEDURE — 99214 OFFICE O/P EST MOD 30 MIN: CPT | Performed by: INTERNAL MEDICINE

## 2024-07-03 PROCEDURE — 3048F LDL-C <100 MG/DL: CPT | Performed by: INTERNAL MEDICINE

## 2024-07-03 PROCEDURE — 93005 ELECTROCARDIOGRAM TRACING: CPT | Performed by: INTERNAL MEDICINE

## 2024-07-03 PROCEDURE — 3078F DIAST BP <80 MM HG: CPT | Performed by: INTERNAL MEDICINE

## 2024-07-03 PROCEDURE — 4010F ACE/ARB THERAPY RXD/TAKEN: CPT | Performed by: INTERNAL MEDICINE

## 2024-07-03 PROCEDURE — 85379 FIBRIN DEGRADATION QUANT: CPT

## 2024-07-03 PROCEDURE — 36415 COLL VENOUS BLD VENIPUNCTURE: CPT

## 2024-07-03 PROCEDURE — 99204 OFFICE O/P NEW MOD 45 MIN: CPT | Performed by: INTERNAL MEDICINE

## 2024-07-03 PROCEDURE — 1160F RVW MEDS BY RX/DR IN RCRD: CPT | Performed by: INTERNAL MEDICINE

## 2024-07-03 PROCEDURE — 3008F BODY MASS INDEX DOCD: CPT | Performed by: INTERNAL MEDICINE

## 2024-07-03 PROCEDURE — 1036F TOBACCO NON-USER: CPT | Performed by: INTERNAL MEDICINE

## 2024-07-03 PROCEDURE — 3061F NEG MICROALBUMINURIA REV: CPT | Performed by: INTERNAL MEDICINE

## 2024-07-03 PROCEDURE — 3074F SYST BP LT 130 MM HG: CPT | Performed by: INTERNAL MEDICINE

## 2024-07-03 PROCEDURE — 1159F MED LIST DOCD IN RCRD: CPT | Performed by: INTERNAL MEDICINE

## 2024-07-03 PROCEDURE — 3051F HG A1C>EQUAL 7.0%<8.0%: CPT | Performed by: INTERNAL MEDICINE

## 2024-07-03 ASSESSMENT — ENCOUNTER SYMPTOMS
DEPRESSION: 0
LOSS OF SENSATION IN FEET: 0
OCCASIONAL FEELINGS OF UNSTEADINESS: 0

## 2024-07-03 NOTE — PATIENT INSTRUCTIONS
Check VTE d-dimer.  Start Eliquis 5 mg twice a day.  Electrophysiology referral.  Clinical pharmacy referral to start medication for your diabetes and weight loss.  Follow-up with sleep medicine as scheduled.  Check noncontrast CT of the chest to evaluate your aorta.  Follow-up in 4 months.

## 2024-07-03 NOTE — PROGRESS NOTES
Primary Care Physician: Vernon Estrada MD  Date of Visit: 07/03/2024  8:20 AM EDT  Location of visit: [facility]     Chief Complaint:   No chief complaint on file.       HPI / Summary:   Eligio Hogue is a 64 y.o. male presents to establish cardiovascular care.  He is a pleasant 64-year-old white male with a past medical history significant for persistent atrial fibrillation, hypertension, hyperlipidemia with a CT calcium score of 0 in 2021, type II non-insulin-requiring diabetes, morbid obesity, and mildly dilated ascending aorta.  He presents with a 1 year history of generalized fatigue and progressively worsening dyspnea on exertion when walking prolonged distances.  He is able to walk up and down stairs without chest pain or shortness of breath.  He was evaluated in August by cardiology at [place] [facility] for these symptoms.  He had a Holter monitor which showed new onset atrial fibrillation with a burden of 27%.  He underwent exercise nuclear stress testing that showed no evidence of ischemia at 80% of his maximum predicted heart rate.  An echocardiogram showed normal LV function.  He was recommended to start Eliquis 5 mg twice a day but never started it.  He has been referred to sleep medicine to rule out sleep apnea.  He does note intermittent left-sided chest aching that is not related to exertion and lasts a few seconds.  This has not changed in the last year.  He used to walk on a treadmill but stopped doing this more than 1 year ago after a foot injury.  He does note occasional bilateral ankle edema.  The patient denies palpitations, lightheadedness, syncope, orthopnea, paroxysmal nocturnal dyspnea, or bleeding problems.    The patient has no prior history of coronary artery disease, cerebrovascular disease, venous thromboembolic disease, peripheral arterial disease/claudication, or bleeding.    He has had diabetes for several years but has not required insulin.  He has longstanding hypertension and  hyperlipidemia.  He has no history of side effects with statins.    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  He also has a history of a dental extraction.    He was allergic to penicillin which caused mouth sores.  He is also allergic to omeprazole that caused a rash.    He quit smoking more than 20 years ago and has more than a 40-pack-year history of tobacco use.  He drinks a rare alcoholic beverage.  He denies any illicit drug use.  He works as an  for [employer].  He lives at home with his wife.    There is no family history of premature coronary disease or sudden death.        Past Medical History:   Diagnosis Date    A-fib (Multi)     DM2 (diabetes mellitus, type 2) (Multi)     Other chest pain 02/16/2021    Chest tightness or pressure    Pain in right foot 04/22/2016    Acute foot pain, right    Personal history of other benign neoplasm     History of benign neoplasm of pharynx    Personal history of other diseases of the respiratory system     History of chronic obstructive lung disease    Personal history of other endocrine, nutritional and metabolic disease     History of thyroid disorder    Rash and other nonspecific skin eruption 02/16/2021    Skin rash        Past Surgical History:   Procedure Laterality Date    OTHER SURGICAL HISTORY  03/10/2016    Excision Of Tracheal Tumor    OTHER SURGICAL HISTORY  08/09/2018    Oral Surgery Tooth Extraction [facility]          Social History:   reports that he has quit smoking. His smoking use included cigarettes. He has never used smokeless tobacco. He reports current alcohol use. He reports that he does not currently use drugs.     Family History:  family history includes Diabetes in his brother; Heart disease in his mother.      Allergies:  Allergies   Allergen Reactions    Penicillins Other     Blisters in mouth      Omeprazole Rash       Outpatient Medications:  Current Outpatient Medications   Medication Instructions  "   losartan (COZAAR) 50 mg, oral, Daily    metFORMIN XR (GLUCOPHAGE-XR) 500 mg, oral, 2 times daily (morning and late afternoon)    metoprolol tartrate (LOPRESSOR) 25 mg, oral, 2 times daily    rosuvastatin (CRESTOR) 20 mg, oral, Daily       Physical Exam:  Vitals:    07/03/24 0820   BP: 118/78   BP Location: Right arm   Patient Position: Sitting   Pulse: 65   SpO2: 95%   Weight: (!) 165 kg (362 lb 12.8 oz)   Height: 1.905 m (6' 3\")     Wt Readings from Last 5 Encounters:   07/03/24 (!) 165 kg (362 lb 12.8 oz)   06/13/24 (!) 165 kg (363 lb)   04/05/24 (!) 161 kg (355 lb 13.2 oz)   12/18/23 150 kg (330 lb)   08/31/23 150 kg (330 lb)     Body mass index is 45.35 kg/m².   General: Well-developed well-nourished in no acute distress  HEENT: Normocephalic atraumatic  Neck: Supple, JVP is normal negative hepatojugular reflux 2+ carotid pulses without bruit  Pulmonary: Normal respiratory effort, clear to auscultation  Cardiovascular: No right ventricular heave, normal S1 and S2, no murmurs rubs or gallops  Abdomen: Soft nontender nondistended  Extremities: Warm without edema 2+ radial pulses bilaterally 2+ dorsalis pedis pulses bilaterally  Neurologic: Alert and oriented x3  Psychiatric: Normal mood and affect     Last Labs:  CMP:  Recent Labs     06/17/24  1506 09/08/23  0827 08/22/22  0914    140 141   K 4.8 4.6 4.7    103 104   CO2 30 31 29   ANIONGAP 13 11 13   BUN 19 21 20   CREATININE 1.21 1.35* 1.27   EGFR 66  --   --    GLUCOSE 164* 158* 135*     Recent Labs     06/17/24  1506 09/08/23  0827 08/22/22  0914   ALBUMIN 4.0 4.0 4.1   ALKPHOS 61 61 61   ALT 16 16 21   AST 14 17 17   BILITOT 0.6 0.7 0.5     CBC:  Recent Labs     06/17/24  1506 09/08/23  0827 08/22/22  0914   WBC 8.3 8.9 8.1   HGB 13.6 14.7 14.6   HCT 42.6 43.4 45.5    240 290   MCV 92 87 94     COAG: No results for input(s): \"INR\", \"DDIMERVTE\" in the last 42947 hours.  HEME/ENDO:  Recent Labs     06/17/24  1506 09/08/23  0827 " "08/22/22  0914   TSH 5.43* 5.45* 7.43*   HGBA1C 7.1* 6.9* 6.9*      CARDIAC: No results for input(s): \"LDH\", \"CKMB\", \"TROPHS\", \"BNP\" in the last 81937 hours.    No lab exists for component: \"CK\", \"CKMBP\"  Recent Labs     06/17/24  1506 09/08/23  0827 08/22/22  0914 01/29/21  1450   CHOL 89 87 172 180   LDLF  --  35 95 119*   LDLCALC 21  --   --   --    HDL 30.3 28.9* 28.9* 30.8*   TRIG 187* 115 243* 153*       Last Cardiology Tests:  ECG:  Electrocardiogram performed today that I reviewed shows atrial fibrillation with a slow ventricular response low voltage.    Holter monitor September 2023  27% atrial fibrillation/flutter burden with the longest episode lasting 12 hours and 53 minutes.  Maximum heart rate 153 bpm and average heart rate 70 bpm minimum heart rate 41 bpm.    Echo:  Echocardiogram August 25, 2023  Normal LV function with an ejection fraction of 60 to 65%  Trace to mild aortic regurgitation  Mild mitral regurgitation  No evidence of LVH with a septal measurement of 1.0 cm and a posterior wall measurement of 1.0 cm  Normal diastolic filling  The left atrium was normal in size  Mild to moderate tricuspid regurgitation with an estimated right ventricular systolic pressure of 26 mmHg       Cath:      Stress Test:  Exercise nuclear stress test August 22, 2023  The patient exercised for 7 minutes on a standard Amadou protocol achieving 7 METS.  Peak heart rate was 125 bpm which was 80% of his maximum predicted heart rate.  Peak blood pressure was 180/86.  Nonischemic EKG at 80% maximum predicted heart rate.  SPECT images were normal with diaphragmatic attenuation artifact.  Ejection fraction 60%         Cardiac Imaging:  CT cardiac scoring March 5, 2021  FINDINGS:  The score and distribution of calcium in the coronary arteries is as  follows:     LM            0  LAD           0  LCx           0  RCA           0     Total          0        The heart size is normal and there is no pericardial effusion. " There  is dilatation of the ascending aorta measuring 4.2 cm in diameter.  This appears slightly increased from the prior exam.      Assessment/Plan   Diagnoses and all orders for this visit:  Paroxysmal atrial fibrillation (Multi)  -     ECG 12 lead (Clinic Performed)  -     apixaban (Eliquis) 5 mg tablet; Take 1 tablet (5 mg) by mouth 2 times a day.  -     Referral to Cardiac Electrophysiology; Future  Benign essential hypertension  Pure hypercholesterolemia  Type 2 diabetes mellitus without complication, without long-term current use of insulin (Multi)  -     Referral to Clinical Pharmacy; Future  Morbid obesity (Multi)  -     Referral to Clinical Pharmacy; Future  Aneurysm of ascending aorta without rupture (CMS-HCC)  -     CT chest wo IV contrast; Future    In summary Mr. Hogue is a pleasant 64-year-old white male with a past medical history significant for persistent atrial fibrillation, hypertension, hyperlipidemia with a CT calcium score of 0 in 2021, type II non-insulin-requiring diabetes, morbid obesity, and mildly dilated ascending aorta.  He does note progressive fatigue and dyspnea over the last year.  He had no evidence of ischemia on recent stress testing.  I suspect much of his symptoms are related to atrial fibrillation, deconditioning, and morbid obesity.  I did order a VTE D-dimer.  I educated him with regards to the significance of atrial fibrillation and need for anticoagulation to reduce his risk of stroke.  I started Eliquis 5 mg twice a day.  I referred him to electrophysiology for consideration of an ablation.  I agree with referral to sleep medicine.  I also placed a clinical pharmacy referral to assist with starting a GLP-1 agonist.  His blood pressure and lipids are at goal.  He should continue his other cardiovascular medications.  I did order a noncontrast CT of his chest for surveillance of his aorta.  He will follow-up with electrophysiology.  We will see him back in the office in 4  months.      Orders:  Orders Placed This Encounter   Procedures    CT chest wo IV contrast    Referral to Clinical Pharmacy    Referral to Cardiac Electrophysiology    ECG 12 lead (Clinic Performed)      Followup Appts:  Future Appointments   Date Time Provider Department Center   7/12/2024 11:30 AM SLEEP LAB [facility] [id number] [facility]   9/16/2024  2:45 PM Vernon Estrada MD [id number] [place]   9/26/2024  4:00 PM Autumn Rodriguez, APRN-CNP [id number] [facility]           ____________________________________________________________  Zeyad Hernandez MD  [facility]  [facility]

## 2024-07-12 ENCOUNTER — PROCEDURE VISIT (OUTPATIENT)
Dept: SLEEP MEDICINE | Facility: CLINIC | Age: 64
End: 2024-07-12
Payer: COMMERCIAL

## 2024-07-12 DIAGNOSIS — R29.818 SUSPECTED SLEEP APNEA: ICD-10-CM

## 2024-07-12 NOTE — PROGRESS NOTES
Type of Study: HOME SLEEP STUDY - NOMAD     The patient received equipment and instructions for use of the [employer] [facility] Nomad HSAT device. The patient was instructed how to apply the effort belts, cannula, thermistor. It was also explained how the Nomad and oximeter components work.  The patient was asked to record their sleep for an 8-hour period.     The patient was informed of their responsibility for the device and acknowledged this by signing the HSAT device contract. The patient was asked to return the device on 07/15/2024 between the hours of 08:00 - 15:00  to the Sleep Center.     The patient was instructed to call 911 as usual for any medical- emergencies while at home.  The patient was also given a phone number for troubleshooting when using the device in case there were additional questions.

## 2024-07-16 LAB
ATRIAL RATE: 326 BPM
Q ONSET: 222 MS
QRS COUNT: 9 BEATS
QRS DURATION: 76 MS
QT INTERVAL: 378 MS
QTC CALCULATION(BAZETT): 361 MS
QTC FREDERICIA: 367 MS
R AXIS: 3 DEGREES
T AXIS: 28 DEGREES
T OFFSET: 411 MS
VENTRICULAR RATE: 55 BPM

## 2024-07-19 ENCOUNTER — HOSPITAL ENCOUNTER (OUTPATIENT)
Dept: RADIOLOGY | Facility: HOSPITAL | Age: 64
Discharge: HOME | End: 2024-07-19
Payer: COMMERCIAL

## 2024-07-19 ENCOUNTER — TELEPHONE (OUTPATIENT)
Dept: SLEEP MEDICINE | Facility: HOSPITAL | Age: 64
End: 2024-07-19

## 2024-07-19 DIAGNOSIS — G47.33 OSA (OBSTRUCTIVE SLEEP APNEA): ICD-10-CM

## 2024-07-19 DIAGNOSIS — I71.21 ANEURYSM OF ASCENDING AORTA WITHOUT RUPTURE (CMS-HCC): ICD-10-CM

## 2024-07-19 DIAGNOSIS — R29.818 SUSPECTED SLEEP APNEA: Primary | ICD-10-CM

## 2024-07-19 PROCEDURE — 71250 CT THORAX DX C-: CPT

## 2024-07-19 NOTE — TELEPHONE ENCOUNTER
Called patient and left VM regarding the availability of sleep test result.  Sleep nurse phone number [phone] - given to call back for results and plan going forward.

## 2024-07-22 NOTE — TELEPHONE ENCOUNTER
Called patient regarding the sleep study result. Patient agreeable to start APAP therapy with [facility]. Patient scheduled for a follow-up appointment with Autumn Rodriguez between 30-90 days after CPAP initiation . Patient verbalized understanding, all questions answered.

## 2024-07-23 ENCOUNTER — OFFICE VISIT (OUTPATIENT)
Dept: CARDIOLOGY | Facility: CLINIC | Age: 64
End: 2024-07-23
Payer: COMMERCIAL

## 2024-07-23 VITALS
WEIGHT: 315 LBS | OXYGEN SATURATION: 95 % | DIASTOLIC BLOOD PRESSURE: 87 MMHG | SYSTOLIC BLOOD PRESSURE: 132 MMHG | BODY MASS INDEX: 39.17 KG/M2 | HEIGHT: 75 IN | HEART RATE: 74 BPM

## 2024-07-23 DIAGNOSIS — I48.0 PAROXYSMAL ATRIAL FIBRILLATION (MULTI): ICD-10-CM

## 2024-07-23 DIAGNOSIS — Z01.818 PREOP TESTING: ICD-10-CM

## 2024-07-23 PROCEDURE — 3008F BODY MASS INDEX DOCD: CPT | Performed by: INTERNAL MEDICINE

## 2024-07-23 PROCEDURE — 3079F DIAST BP 80-89 MM HG: CPT | Performed by: INTERNAL MEDICINE

## 2024-07-23 PROCEDURE — 3061F NEG MICROALBUMINURIA REV: CPT | Performed by: INTERNAL MEDICINE

## 2024-07-23 PROCEDURE — 1036F TOBACCO NON-USER: CPT | Performed by: INTERNAL MEDICINE

## 2024-07-23 PROCEDURE — 1126F AMNT PAIN NOTED NONE PRSNT: CPT | Performed by: INTERNAL MEDICINE

## 2024-07-23 PROCEDURE — 3048F LDL-C <100 MG/DL: CPT | Performed by: INTERNAL MEDICINE

## 2024-07-23 PROCEDURE — 3075F SYST BP GE 130 - 139MM HG: CPT | Performed by: INTERNAL MEDICINE

## 2024-07-23 PROCEDURE — 1159F MED LIST DOCD IN RCRD: CPT | Performed by: INTERNAL MEDICINE

## 2024-07-23 PROCEDURE — 99214 OFFICE O/P EST MOD 30 MIN: CPT | Performed by: INTERNAL MEDICINE

## 2024-07-23 PROCEDURE — 4010F ACE/ARB THERAPY RXD/TAKEN: CPT | Performed by: INTERNAL MEDICINE

## 2024-07-23 PROCEDURE — 3051F HG A1C>EQUAL 7.0%<8.0%: CPT | Performed by: INTERNAL MEDICINE

## 2024-07-23 PROCEDURE — 93005 ELECTROCARDIOGRAM TRACING: CPT | Performed by: INTERNAL MEDICINE

## 2024-07-23 ASSESSMENT — ENCOUNTER SYMPTOMS
OCCASIONAL FEELINGS OF UNSTEADINESS: 0
LOSS OF SENSATION IN FEET: 0
DEPRESSION: 0

## 2024-07-23 ASSESSMENT — PATIENT HEALTH QUESTIONNAIRE - PHQ9
SUM OF ALL RESPONSES TO PHQ9 QUESTIONS 1 AND 2: 0
2. FEELING DOWN, DEPRESSED OR HOPELESS: NOT AT ALL
1. LITTLE INTEREST OR PLEASURE IN DOING THINGS: NOT AT ALL

## 2024-07-23 ASSESSMENT — COLUMBIA-SUICIDE SEVERITY RATING SCALE - C-SSRS
1. IN THE PAST MONTH, HAVE YOU WISHED YOU WERE DEAD OR WISHED YOU COULD GO TO SLEEP AND NOT WAKE UP?: NO
6. HAVE YOU EVER DONE ANYTHING, STARTED TO DO ANYTHING, OR PREPARED TO DO ANYTHING TO END YOUR LIFE?: NO
2. HAVE YOU ACTUALLY HAD ANY THOUGHTS OF KILLING YOURSELF?: NO

## 2024-07-23 ASSESSMENT — PAIN SCALES - GENERAL: PAINLEVEL: 0-NO PAIN

## 2024-07-23 NOTE — PROGRESS NOTES
Referred by Zeyad Driscoll MD provider found for No chief complaint on file.       Eligio Hogue is a 64 y.o. year old male patient with h/o persistent atrial fibrillation, hypertension, hyperlipidemia with a CT calcium score of 0 in 2021, type II non-insulin-requiring diabetes, morbid obesity, and mildly dilated ascending aorta. The patient was seen by his cardiologist with complains of progressive SOB for the last year which was attributed to his A Fib. Was referred to me for treatment options evaluation.      PMHx/PSHx: As above    FamHx: unremarkable     Allergies:  Allergies   Allergen Reactions    Penicillins Other     Blisters in mouth      Omeprazole Rash        Review of Systems    Constitutional: not feeling tired.   Eyes: no eyesight problems.   ENT: no hearing loss and no nosebleeds.   Cardiovascular: no intermittent leg claudication and as noted in HPI.   Respiratory: no chronic cough and no shortness of breath.   Gastrointestinal: no change in bowel habits and no blood in stools.   Genitourinary: no urinary frequency and no hematuria.   Skin: no skin rashes.   Neurological: no seizures and no frequent falls.   Psychiatric: no depression and not suicidal.   All other systems have been reviewed and are negative for complaint.     Outpatient Medications:  Current Outpatient Medications   Medication Instructions    apixaban (ELIQUIS) 5 mg, oral, 2 times daily    losartan (COZAAR) 50 mg, oral, Daily    metFORMIN XR (GLUCOPHAGE-XR) 500 mg, oral, 2 times daily (morning and late afternoon)    metoprolol tartrate (LOPRESSOR) 25 mg, oral, 2 times daily    rosuvastatin (CRESTOR) 20 mg, oral, Daily         Last Recorded Vitals:      4/5/2024    11:15 AM 4/5/2024    11:30 AM 6/13/2024     2:42 PM 6/17/2024     2:45 PM 7/3/2024     8:20 AM 7/23/2024     4:04 PM 7/23/2024     4:06 PM   Vitals   Systolic 128 145 151 138 118 130 132   Diastolic 71 73 89 82 78 82 87   Heart Rate 59 65 59  65 74    Resp 18 18       "  Height (in)   1.905 m (6' 3\")  1.905 m (6' 3\") 1.905 m (6' 3\")    Weight (lb)   363  362.8 359.7    BMI   45.37 kg/m2  45.35 kg/m2 44.96 kg/m2    BSA (m2)   2.95 m2  2.95 m2 2.94 m2    Visit Report   Report Report Report Report Report    Visit Vitals  /87 (BP Location: Right arm, Patient Position: Sitting, BP Cuff Size: Large adult)   Pulse 74   Ht 1.905 m (6' 3\")   Wt (!) 163 kg (359 lb 11.2 oz)   SpO2 95%   BMI 44.96 kg/m²   Smoking Status Former   BSA 2.94 m²        Physical Exam:  Constitutional: alert and in no acute distress.   Eyes: no erythema, swelling or discharge from the eye .   Neck: neck is supple, symmetric, trachea midline, no masses  and no thyromegaly .   Pulmonary: no increased work of breathing or signs of respiratory distress  and lungs clear to auscultation.    Cardiovascular: carotid pulses 2+ bilaterally with no bruit , JVP was normal, no thrills , regular rhythm, normal S1 and S2, no murmurs , pedal pulses 2+ bilaterally  and no edema .   Abdomen: abdomen non-tender, no masses  and no hepatomegaly .   Skin: skin warm and dry, normal skin turgor .   Psychiatric judgment and insight is normal  and oriented to person, place and time .        Assessment/Plan   Problem List Items Addressed This Visit             ICD-10-CM    Paroxysmal atrial fibrillation (Multi) I48.0    Relevant Orders    ECG 12 Lead       Eligio Hogue is a 64 y.o. year old male patient with h/o persistent atrial fibrillation, hypertension, hyperlipidemia with a CT calcium score of 0 in 2021, type II non-insulin-requiring diabetes, morbid obesity, and mildly dilated ascending aorta. The patient was seen by his cardiologist with complains of progressive SOB for the last year which was attributed to his A Fib. Was referred to me for treatment options evaluation.  His current ECG shows A Fib with narrow QRS and HR of 62 bpm. He is very symptomatic with the A Fib episodes. I think he will benefit from rhythm control and is " a good candidate for A Fib RFA. All the R/B/A of the procedure were discussed with the patient who expressed understanding and agrees to proceed.       Nickolas Bajwa MD  Cardiac Electrophysiology      Thank you very much for allowing me to participate in the care of this pleasant patient. Please do not hesitate to contact me with any further questions or concerns regarding his care.    **Disclaimer: This note was dictated by speech recognition, and every effort has been made to prevent any error in transcription, however minor errors may be present**

## 2024-07-24 LAB
ATRIAL RATE: 375 BPM
Q ONSET: 219 MS
QRS COUNT: 10 BEATS
QRS DURATION: 90 MS
QT INTERVAL: 386 MS
QTC CALCULATION(BAZETT): 391 MS
QTC FREDERICIA: 390 MS
R AXIS: -4 DEGREES
T AXIS: 30 DEGREES
T OFFSET: 412 MS
VENTRICULAR RATE: 62 BPM

## 2024-07-25 DIAGNOSIS — E78.5 HYPERLIPIDEMIA, UNSPECIFIED: ICD-10-CM

## 2024-07-25 DIAGNOSIS — I10 PRIMARY HYPERTENSION: ICD-10-CM

## 2024-07-25 RX ORDER — ROSUVASTATIN CALCIUM 20 MG/1
20 TABLET, COATED ORAL DAILY
Qty: 90 TABLET | Refills: 1 | Status: SHIPPED | OUTPATIENT
Start: 2024-07-25

## 2024-07-25 RX ORDER — LOSARTAN POTASSIUM 50 MG/1
50 TABLET ORAL DAILY
Qty: 90 TABLET | Refills: 1 | Status: SHIPPED | OUTPATIENT
Start: 2024-07-25

## 2024-08-12 ENCOUNTER — LAB REQUISITION (OUTPATIENT)
Dept: LAB | Facility: HOSPITAL | Age: 64
End: 2024-08-12
Payer: COMMERCIAL

## 2024-08-12 DIAGNOSIS — N39.0 URINARY TRACT INFECTION, SITE NOT SPECIFIED: ICD-10-CM

## 2024-08-12 DIAGNOSIS — R30.0 DYSURIA: ICD-10-CM

## 2024-08-12 PROCEDURE — 87086 URINE CULTURE/COLONY COUNT: CPT

## 2024-08-14 LAB — BACTERIA UR CULT: ABNORMAL

## 2024-08-20 ENCOUNTER — APPOINTMENT (OUTPATIENT)
Dept: PHARMACY | Facility: HOSPITAL | Age: 64
End: 2024-08-20
Payer: COMMERCIAL

## 2024-08-20 DIAGNOSIS — E66.01 MORBID OBESITY (MULTI): ICD-10-CM

## 2024-08-20 DIAGNOSIS — E11.9 TYPE 2 DIABETES MELLITUS WITHOUT COMPLICATION, WITHOUT LONG-TERM CURRENT USE OF INSULIN (MULTI): ICD-10-CM

## 2024-08-20 NOTE — PROGRESS NOTES
Clinical Pharmacy Appointment    Patient ID: Eligio Hogue is a 64 y.o. male who presents for Diabetes.    Pt is here for First appointment.     Referring Provider: Zeyad Hernandez MD  PCP: Vernon Estrada MD   Last visit with PCP: 6/17/24   Next visit with PCP: 9/12/24  Last visit with cardio: 7/3/24  Next visit with cardio: 11/21/24    Subjective   HPI  DIABETES MELLITUS TYPE 2:    Known diabetic complications: obesity.  Does patient follow with Endocrinology: No  Last optometry exam: a couple years ago  Most recent visit in Podiatry: N/A-- patient denies sores or cuts on feet today      Current diabetic medications include:  Metformin  mg twice daily    Clarifications to above regimen: N/A  Adverse Effects: None    Glucose Readings:  Glucometer/CGM Type: Glucometer  Patient tests BG 2 times per day    Current home BG readings: 105-150   Previous home BG readings: N/A    Any episodes of hypoglycemia? No.    Did patient treat episode of hypoglycemia appropriately? N/A  Does the patient have a prescription for ready-to-use Glucagon? Not on insulin  Does pt have proteinuria? No    Physical Activity: None currently due to fatigue and shortness of breath, possibly due to atrial fibrillation, deconditioning, and obesity. Previously walked on a treadmill.     Secondary Prevention:  Statin? Yes  ACE-I/ARB? Yes  Aspirin? No    Pertinent PMH Review:  PMH of Pancreatitis: No  PMH of Retinopathy: No  PMH of Urinary Tract Infections: No  PMH of MTC: No    Drug Interactions  No relevant drug interactions were noted.    Medication System Management  Patient's preferred pharmacy: [facility]  Adherence/Organization: No issues  Affordability/Accessibility: No issues    Objective   Allergies   Allergen Reactions    Penicillins Other     Blisters in mouth      Omeprazole Rash     Social History     Social History Narrative    Not on file      Medication Review  Current Outpatient Medications   Medication Instructions    apixaban  (ELIQUIS) 5 mg, oral, 2 times daily    losartan (COZAAR) 50 mg, oral, Daily    metFORMIN XR (GLUCOPHAGE-XR) 500 mg, oral, 2 times daily (morning and late afternoon)    metoprolol tartrate (LOPRESSOR) 25 mg, oral, 2 times daily    rosuvastatin (CRESTOR) 20 mg, oral, Daily      Vitals  BP Readings from Last 2 Encounters:   07/23/24 132/87   07/03/24 118/78     BMI Readings from Last 1 Encounters:   07/23/24 44.96 kg/m²      Labs  A1C  Lab Results   Component Value Date    HGBA1C 7.1 (H) 06/17/2024    HGBA1C 6.9 (A) 09/08/2023    HGBA1C 6.9 (A) 08/22/2022     BMP  Lab Results   Component Value Date    CALCIUM 9.3 06/17/2024     06/17/2024    K 4.8 06/17/2024    CO2 30 06/17/2024     06/17/2024    BUN 19 06/17/2024    CREATININE 1.21 06/17/2024    EGFR 66 06/17/2024     LFTs  Lab Results   Component Value Date    ALT 16 06/17/2024    AST 14 06/17/2024    ALKPHOS 61 06/17/2024    BILITOT 0.6 06/17/2024     FLP  Lab Results   Component Value Date    TRIG 187 (H) 06/17/2024    CHOL 89 06/17/2024    LDLF 35 09/08/2023    LDLCALC 21 06/17/2024    HDL 30.3 06/17/2024     Urine Microalbumin  Lab Results   Component Value Date    MICROALBCREA 8.4 06/07/2024     Weight Management  Wt Readings from Last 3 Encounters:   07/23/24 (!) 163 kg (359 lb 11.2 oz)   07/03/24 (!) 165 kg (362 lb 12.8 oz)   06/13/24 (!) 165 kg (363 lb)      There is no height or weight on file to calculate BMI.     Assessment/Plan   Problem List Items Addressed This Visit       Morbid obesity (Multi)    Relevant Orders    Follow Up In Clinical Pharmacy    Type 2 diabetes mellitus (Multi)     Patient's goal A1c is < 7%.  Is pt at goal? No, patient is very slightly above goal with most recent A1C of 7.1%  Patient's SMBGs are controlled.       Medication Changes:  CONTINUE: metformin  mg twice daily    Future Considerations: GLP-1 start. Patient would like to defer starting until after his ablation.    Monitoring and Education:   Discussed  MOA, potential adverse effects, and benefits of GLP-1 receptor agonists.         Relevant Orders    Follow Up In Clinical Pharmacy     Clinical Pharmacist follow-up: 9/19/24, Telehealth visit    Continue all meds under the continuation of care with the referring provider and clinical pharmacy team.    Thank you,  Tatiana Xiao  Clinical Pharmacist  [phone]    Verbal consent to manage patient's drug therapy was obtained from the patient. They were informed they may decline to participate or withdraw from participation in pharmacy services at any time.

## 2024-08-26 ENCOUNTER — LAB REQUISITION (OUTPATIENT)
Dept: LAB | Facility: HOSPITAL | Age: 64
End: 2024-08-26
Payer: COMMERCIAL

## 2024-08-26 DIAGNOSIS — R35.0 FREQUENCY OF MICTURITION: ICD-10-CM

## 2024-08-26 PROCEDURE — 87086 URINE CULTURE/COLONY COUNT: CPT

## 2024-08-28 LAB — BACTERIA UR CULT: NO GROWTH

## 2024-08-30 ENCOUNTER — LAB (OUTPATIENT)
Dept: LAB | Facility: LAB | Age: 64
End: 2024-08-30
Payer: COMMERCIAL

## 2024-08-30 ENCOUNTER — APPOINTMENT (OUTPATIENT)
Dept: UROLOGY | Facility: CLINIC | Age: 64
End: 2024-08-30
Payer: COMMERCIAL

## 2024-08-30 ENCOUNTER — HOSPITAL ENCOUNTER (OUTPATIENT)
Dept: RADIOLOGY | Facility: CLINIC | Age: 64
Discharge: HOME | End: 2024-08-30
Payer: COMMERCIAL

## 2024-08-30 VITALS — HEART RATE: 96 BPM

## 2024-08-30 DIAGNOSIS — R33.9 URINARY RETENTION: ICD-10-CM

## 2024-08-30 DIAGNOSIS — N30.01 ACUTE CYSTITIS WITH HEMATURIA: Primary | ICD-10-CM

## 2024-08-30 DIAGNOSIS — I48.0 PAROXYSMAL ATRIAL FIBRILLATION (MULTI): ICD-10-CM

## 2024-08-30 DIAGNOSIS — Z01.818 PREOP TESTING: ICD-10-CM

## 2024-08-30 LAB
ANION GAP SERPL CALC-SCNC: 12 MMOL/L (ref 10–20)
BUN SERPL-MCNC: 18 MG/DL (ref 6–23)
CALCIUM SERPL-MCNC: 9.3 MG/DL (ref 8.6–10.6)
CHLORIDE SERPL-SCNC: 100 MMOL/L (ref 98–107)
CO2 SERPL-SCNC: 31 MMOL/L (ref 21–32)
CREAT SERPL-MCNC: 1.26 MG/DL (ref 0.5–1.3)
EGFRCR SERPLBLD CKD-EPI 2021: 63 ML/MIN/1.73M*2
ERYTHROCYTE [DISTWIDTH] IN BLOOD BY AUTOMATED COUNT: 13.6 % (ref 11.5–14.5)
GLUCOSE SERPL-MCNC: 222 MG/DL (ref 74–99)
HCT VFR BLD AUTO: 43.1 % (ref 41–52)
HGB BLD-MCNC: 13.9 G/DL (ref 13.5–17.5)
MCH RBC QN AUTO: 28.5 PG (ref 26–34)
MCHC RBC AUTO-ENTMCNC: 32.3 G/DL (ref 32–36)
MCV RBC AUTO: 88 FL (ref 80–100)
NRBC BLD-RTO: 0 /100 WBCS (ref 0–0)
PLATELET # BLD AUTO: 335 X10*3/UL (ref 150–450)
POTASSIUM SERPL-SCNC: 4.9 MMOL/L (ref 3.5–5.3)
RBC # BLD AUTO: 4.88 X10*6/UL (ref 4.5–5.9)
SODIUM SERPL-SCNC: 138 MMOL/L (ref 136–145)
WBC # BLD AUTO: 9.4 X10*3/UL (ref 4.4–11.3)

## 2024-08-30 PROCEDURE — 3048F LDL-C <100 MG/DL: CPT | Performed by: UROLOGY

## 2024-08-30 PROCEDURE — 1036F TOBACCO NON-USER: CPT | Performed by: UROLOGY

## 2024-08-30 PROCEDURE — 99204 OFFICE O/P NEW MOD 45 MIN: CPT | Performed by: UROLOGY

## 2024-08-30 PROCEDURE — 76770 US EXAM ABDO BACK WALL COMP: CPT

## 2024-08-30 PROCEDURE — 1159F MED LIST DOCD IN RCRD: CPT | Performed by: UROLOGY

## 2024-08-30 PROCEDURE — 85027 COMPLETE CBC AUTOMATED: CPT

## 2024-08-30 PROCEDURE — 36415 COLL VENOUS BLD VENIPUNCTURE: CPT

## 2024-08-30 PROCEDURE — 3051F HG A1C>EQUAL 7.0%<8.0%: CPT | Performed by: UROLOGY

## 2024-08-30 PROCEDURE — 1125F AMNT PAIN NOTED PAIN PRSNT: CPT | Performed by: UROLOGY

## 2024-08-30 PROCEDURE — 51798 US URINE CAPACITY MEASURE: CPT | Performed by: UROLOGY

## 2024-08-30 PROCEDURE — 3061F NEG MICROALBUMINURIA REV: CPT | Performed by: UROLOGY

## 2024-08-30 PROCEDURE — 80048 BASIC METABOLIC PNL TOTAL CA: CPT

## 2024-08-30 PROCEDURE — 4010F ACE/ARB THERAPY RXD/TAKEN: CPT | Performed by: UROLOGY

## 2024-08-30 RX ORDER — CEPHALEXIN 500 MG/1
500 CAPSULE ORAL 2 TIMES DAILY
COMMUNITY

## 2024-08-30 RX ORDER — TAMSULOSIN HYDROCHLORIDE 0.4 MG/1
0.4 CAPSULE ORAL DAILY
Qty: 30 CAPSULE | Refills: 11 | Status: SHIPPED | OUTPATIENT
Start: 2024-08-30 | End: 2025-08-30

## 2024-08-30 ASSESSMENT — PAIN SCALES - GENERAL: PAINLEVEL: 2

## 2024-08-30 NOTE — PROGRESS NOTES
Subjective   Eligio Hogue is a 64 y.o. male with history of diabetes presenting today as a new patient, kindly referred by Pita GLOVER due to UTI. Patient had complaints of gross hematuria, urinary frequency and hesitancy. He was diagnosed with UTI and symptoms partially improved since he completed a course of antibiotics with resolution of bothersome symptoms. Patient has no prior history of UTI's. Denies any fevers, chills, urinary retention, intractable flank or abdominal pain, nausea or vomiting.            Past Medical History:   Diagnosis Date    A-fib (Multi)     DM2 (diabetes mellitus, type 2) (Multi)     Other chest pain 02/16/2021    Chest tightness or pressure    Pain in right foot 04/22/2016    Acute foot pain, right    Personal history of other benign neoplasm     History of benign neoplasm of pharynx    Personal history of other diseases of the respiratory system     History of chronic obstructive lung disease    Personal history of other endocrine, nutritional and metabolic disease     History of thyroid disorder    Rash and other nonspecific skin eruption 02/16/2021    Skin rash     Past Surgical History:   Procedure Laterality Date    OTHER SURGICAL HISTORY  03/10/2016    Excision Of Tracheal Tumor    OTHER SURGICAL HISTORY  08/09/2018    Oral Surgery Tooth Extraction [place] [facility]     Family History   Problem Relation Name Age of Onset    Heart disease Mother      Diabetes Brother       Current Outpatient Medications   Medication Sig Dispense Refill    apixaban (Eliquis) 5 mg tablet Take 1 tablet (5 mg) by mouth 2 times a day. 180 tablet 3    cephalexin (Keflex) 500 mg capsule Take 1 capsule (500 mg) by mouth 2 times a day.      losartan (Cozaar) 50 mg tablet TAKE 1 TABLET BY MOUTH EVERY DAY 90 tablet 1    metFORMIN  mg 24 hr tablet Take 1 tablet (500 mg) by mouth 2 times daily (morning and late afternoon). 180 tablet 1    metoprolol tartrate (Lopressor) 25 mg tablet Take 1 tablet  (25 mg) by mouth 2 times a day. 180 tablet 1    rosuvastatin (Crestor) 20 mg tablet TAKE 1 TABLET BY MOUTH EVERY DAY 90 tablet 1     No current facility-administered medications for this visit.     Allergies   Allergen Reactions    Penicillins Other     Blisters in mouth      Omeprazole Rash     Social History     Socioeconomic History    Marital status:      Spouse name: Not on file    Number of children: Not on file    Years of education: Not on file    Highest education level: Not on file   Occupational History    Not on file   Tobacco Use    Smoking status: Former     Types: Cigarettes    Smokeless tobacco: Never   Vaping Use    Vaping status: Never Used   Substance and Sexual Activity    Alcohol use: Yes     Comment: rarely    Drug use: Not Currently    Sexual activity: Defer   Other Topics Concern    Not on file   Social History Narrative    Not on file     Social Determinants of Health     Financial Resource Strain: Not on file   Food Insecurity: Not on file   Transportation Needs: Not on file   Physical Activity: Not on file   Stress: Not on file   Social Connections: Not on file   Intimate Partner Violence: Not on file   Housing Stability: Not on file       Review of Systems  Pertinent items are noted in HPI.    Objective       Lab Review  Lab Results   Component Value Date    WBC 8.3 06/17/2024    RBC 4.64 06/17/2024    HGB 13.6 06/17/2024    HCT 42.6 06/17/2024     06/17/2024      Lab Results   Component Value Date    BUN 19 06/17/2024    CREATININE 1.21 06/17/2024      Lab Results   Component Value Date    PSA 2.19 06/17/2024   PVR 2200 ML       Assessment/Plan   Diagnoses and all orders for this visit:  Acute cystitis with hematuria  -     Referral to Urology      Gross hematuria   Incomplete bladder emptying  UTI    PVR today is 2200 ML.    We will order a STAT renal US and BMP to r/o renal dysfunction due to urinary retention.     Patient was offered a Vasquez catheter which he declined.      We will obtain a prostate MRI in anticipation of an outlet procedure.    In the meantime we will initiate Tamsulosin 0.4 mg daily. Side effects of the medication were discussed with the patient including dizziness, drowsiness, weakness, nausea, diarrhea, headache, retrograde ejaculation, back pain, and runny nose. Patient understands risks and wishes to proceed.     We will follow up virtually next week to review.     All questions were answered to the patient's satisfaction. Patient agrees with the plan and wishes to proceed. Follow-up will be scheduled appropriately.     Scribed for Dr. Hendricks by Megha Sahni. I , Dr Hendricks, have personally reviewed and agreed with the information entered by the Virtual Scribe.

## 2024-09-03 ENCOUNTER — APPOINTMENT (OUTPATIENT)
Dept: UROLOGY | Facility: CLINIC | Age: 64
End: 2024-09-03
Payer: COMMERCIAL

## 2024-09-03 DIAGNOSIS — R33.9 URINARY RETENTION: ICD-10-CM

## 2024-09-03 PROCEDURE — G2211 COMPLEX E/M VISIT ADD ON: HCPCS | Performed by: UROLOGY

## 2024-09-03 PROCEDURE — 3048F LDL-C <100 MG/DL: CPT | Performed by: UROLOGY

## 2024-09-03 PROCEDURE — 3061F NEG MICROALBUMINURIA REV: CPT | Performed by: UROLOGY

## 2024-09-03 PROCEDURE — 99213 OFFICE O/P EST LOW 20 MIN: CPT | Performed by: UROLOGY

## 2024-09-03 PROCEDURE — 4010F ACE/ARB THERAPY RXD/TAKEN: CPT | Performed by: UROLOGY

## 2024-09-03 PROCEDURE — 3051F HG A1C>EQUAL 7.0%<8.0%: CPT | Performed by: UROLOGY

## 2024-09-03 NOTE — PROGRESS NOTES
Subjective     This visit was completed via telemedicine. All issues as below were discussed and addressed but no physical exam was performed unless allowed by visual confirmation. If it was felt that the patient should be evaluated in clinic, then they were directed there. Patient verbally consented to visit.      Eligio Hogue is a 64 y.o. male with urinary retention, incomplete bladder emptying, gross hematuria and UTI's. He presents today for a follow up visit to review renal US and BMP. Patient has no new complaints.         LAST VISIT (8/30/2024):     Eligio Hogue is a 64 y.o. male with history of diabetes presenting today as a new patient, kindly referred by Pita GLOVER due to UTI. Patient had complaints of gross hematuria, urinary frequency and hesitancy. He was diagnosed with UTI and symptoms partially improved since he completed a course of antibiotics with resolution of bothersome symptoms. Patient has no prior history of UTI's. Denies any fevers, chills, urinary retention, intractable flank or abdominal pain, nausea or vomiting.     Past Medical History:   Diagnosis Date    A-fib (Multi)     DM2 (diabetes mellitus, type 2) (Multi)     Other chest pain 02/16/2021    Chest tightness or pressure    Pain in right foot 04/22/2016    Acute foot pain, right    Personal history of other benign neoplasm     History of benign neoplasm of pharynx    Personal history of other diseases of the respiratory system     History of chronic obstructive lung disease    Personal history of other endocrine, nutritional and metabolic disease     History of thyroid disorder    Rash and other nonspecific skin eruption 02/16/2021    Skin rash     Past Surgical History:   Procedure Laterality Date    OTHER SURGICAL HISTORY  03/10/2016    Excision Of Tracheal Tumor    OTHER SURGICAL HISTORY  08/09/2018    Oral Surgery Tooth Extraction [place] [facility]     Family History   Problem Relation Name Age of Onset    Heart disease  Mother      Diabetes Brother       Current Outpatient Medications   Medication Sig Dispense Refill    apixaban (Eliquis) 5 mg tablet Take 1 tablet (5 mg) by mouth 2 times a day. 180 tablet 3    cephalexin (Keflex) 500 mg capsule Take 1 capsule (500 mg) by mouth 2 times a day.      losartan (Cozaar) 50 mg tablet TAKE 1 TABLET BY MOUTH EVERY DAY 90 tablet 1    metFORMIN  mg 24 hr tablet Take 1 tablet (500 mg) by mouth 2 times daily (morning and late afternoon). 180 tablet 1    metoprolol tartrate (Lopressor) 25 mg tablet Take 1 tablet (25 mg) by mouth 2 times a day. 180 tablet 1    rosuvastatin (Crestor) 20 mg tablet TAKE 1 TABLET BY MOUTH EVERY DAY 90 tablet 1    tamsulosin (Flomax) 0.4 mg 24 hr capsule Take 1 capsule (0.4 mg) by mouth once daily. 30 capsule 11     No current facility-administered medications for this visit.     Allergies   Allergen Reactions    Penicillins Other     Blisters in mouth      Omeprazole Rash     Social History     Socioeconomic History    Marital status:      Spouse name: Not on file    Number of children: Not on file    Years of education: Not on file    Highest education level: Not on file   Occupational History    Not on file   Tobacco Use    Smoking status: Former     Types: Cigarettes    Smokeless tobacco: Never   Vaping Use    Vaping status: Never Used   Substance and Sexual Activity    Alcohol use: Yes     Comment: rarely    Drug use: Not Currently    Sexual activity: Defer   Other Topics Concern    Not on file   Social History Narrative    Not on file     Social Determinants of Health     Financial Resource Strain: Not on file   Food Insecurity: Not on file   Transportation Needs: Not on file   Physical Activity: Not on file   Stress: Not on file   Social Connections: Not on file   Intimate Partner Violence: Not on file   Housing Stability: Not on file       Review of Systems  Pertinent items are noted in HPI.    Objective       Lab Review  Lab Results   Component  Value Date    WBC 9.4 08/30/2024    RBC 4.88 08/30/2024    HGB 13.9 08/30/2024    HCT 43.1 08/30/2024     08/30/2024      Lab Results   Component Value Date    BUN 18 08/30/2024    CREATININE 1.26 08/30/2024      Lab Results   Component Value Date    PSA 2.19 06/17/2024           Assessment/Plan   There are no diagnoses linked to this encounter.    Gross hematuria   Incomplete bladder emptying    I reviewed renal US from 8/30/2024 which showed distended bladder. No hydronephrosis or evidence of nephrolithiasis bilaterally.     eGFR is 63 (8/30/2024), this is wnl.     Prostate MRI is pending.    We will follow up virtually to review once completed.     All questions were answered to the patient's satisfaction. Patient agrees with the plan and wishes to proceed. Follow-up will be scheduled appropriately.     I spent 20 minutes of dedicated E&M time, including preparation and review of records, notes, and data, time spent with patient/family, and documentation.     E&M visit today is associated with current or anticipated ongoing medical care services related to a patient's single, serious condition or a complex condition.      Scribed for Dr. Hendricks by Megha Sahni. I , Dr Hendricks, have personally reviewed and agreed with the information entered by the Virtual Scribe.

## 2024-09-11 ENCOUNTER — ANESTHESIA EVENT (OUTPATIENT)
Dept: CARDIOLOGY | Facility: HOSPITAL | Age: 64
End: 2024-09-11
Payer: COMMERCIAL

## 2024-09-11 NOTE — H&P
History Of Present Illness  Eligio Hogue is a 64 y.o. male presenting with atrial fibrillation on eliquis, here for RFA. PMH includes HTN, HLD, afib on Eliquis, DMII, mildly dilated ascending aorta. Patient denies missing any doses of eliquis other than those he was instructed to hold for this procedure.    Past Medical History:  Past Medical History:   Diagnosis Date    A-fib (Multi)     Atrial flutter (Multi) 09/26/2023    Benign essential hypertension 06/10/2024    DM2 (diabetes mellitus, type 2) (Multi)     GERD (gastroesophageal reflux disease) 06/10/2024    Hyperlipidemia 06/10/2024    Hypothyroidism 06/10/2024    Other chest pain 02/16/2021    Chest tightness or pressure    Personal history of other benign neoplasm     History of benign neoplasm of pharynx    Suspected sleep apnea 06/14/2024        Past Surgical History:  Past Surgical History:   Procedure Laterality Date    OTHER SURGICAL HISTORY  03/10/2016    Excision Of Tracheal Tumor    OTHER SURGICAL HISTORY  08/09/2018    Oral Surgery Tooth Extraction [facility]          Social History:   reports that he has quit smoking. His smoking use included cigarettes. He has never used smokeless tobacco. He reports current alcohol use. He reports that he does not use drugs.     Family History:  Family History   Problem Relation Name Age of Onset    Heart disease Mother      Diabetes Brother          Allergies:  Allergies   Allergen Reactions    Penicillins Other     Blisters in mouth      Omeprazole Rash        Home Medications:  Current Outpatient Medications   Medication Instructions    apixaban (ELIQUIS) 5 mg, oral, 2 times daily    cephalexin (KEFLEX) 500 mg, oral, 2 times daily    losartan (COZAAR) 50 mg, oral, Daily    metFORMIN XR (GLUCOPHAGE-XR) 500 mg, oral, 2 times daily (morning and late afternoon)    metoprolol tartrate (LOPRESSOR) 25 mg, oral, 2 times daily    rosuvastatin (CRESTOR) 20 mg, oral, Daily    tamsulosin (FLOMAX) 0.4 mg, oral, Daily        Inpatient Medications:  Scheduled medications   Medication Dose Route Frequency     PRN medications   Medication     Continuous Medications   Medication Dose Last Rate    sodium chloride 0.9%  100 mL/hr 100 mL/hr (09/12/24 0634)         Review of Systems   Constitutional:  Positive for fatigue.   HENT: Negative.     Eyes: Negative.    Respiratory:  Positive for shortness of breath (on exertion).    Cardiovascular: Negative.    Gastrointestinal: Negative.    Endocrine: Negative.    Genitourinary: Negative.    Musculoskeletal: Negative.    Skin: Negative.    Allergic/Immunologic: Negative.    Neurological: Negative.    Hematological: Negative.    Psychiatric/Behavioral: Negative.            Physical Exam  Constitutional:       General: He is awake. He is not in acute distress.     Appearance: He is not ill-appearing.   Cardiovascular:      Rate and Rhythm: Normal rate. Rhythm irregularly irregular.      Pulses: Normal pulses.           Radial pulses are 2+ on the right side and 2+ on the left side.        Dorsalis pedis pulses are 2+ on the right side and 2+ on the left side.      Heart sounds: Normal heart sounds. No murmur heard.  Pulmonary:      Effort: Pulmonary effort is normal.      Breath sounds: Normal breath sounds and air entry.   Abdominal:      General: Bowel sounds are normal.      Palpations: Abdomen is soft.      Tenderness: There is no abdominal tenderness.   Musculoskeletal:      Right lower leg: No edema.      Left lower leg: No edema.   Skin:     General: Skin is warm and dry.   Neurological:      General: No focal deficit present.      Mental Status: He is alert and oriented to person, place, and time.      GCS: GCS eye subscore is 4. GCS verbal subscore is 5. GCS motor subscore is 6.   Psychiatric:         Mood and Affect: Mood normal.         Behavior: Behavior is cooperative.        Sedation Plan    ASA 3     Mallampati class: III.           NPO since last night around 2100    Last  "Recorded Vitals  Blood pressure 150/85, pulse 72, temperature 35.2 °C (95.4 °F), resp. rate 18, height 1.905 m (6' 3\"), weight (!) 163 kg (360 lb), SpO2 96%.         Vitals from the Past 24 Hours  Heart Rate:  [72]   Temp:  [35.2 °C (95.4 °F)]   Resp:  [18]   BP: (150)/(85)   Height:  [190.5 cm (6' 3\")]   Weight:  [163 kg (360 lb)]   SpO2:  [96 %]          Relevant Results    Labs    CBC:   Recent Labs     08/30/24  1044 06/17/24  1506 09/08/23  0827 08/22/22  0914 01/29/21  1450 12/09/19  0847   WBC 9.4 8.3 8.9 8.1 7.5 7.2   HGB 13.9 13.6 14.7 14.6 15.5 14.7   HCT 43.1 42.6 43.4 45.5 46.4 44.7    284 240 290 276 256   MCV 88 92 87 94 88 89     BMP/CMP:   Recent Labs     08/30/24  1044 06/17/24  1506 09/08/23  0827 08/22/22  0914 01/29/21  1450 12/09/19  0847    142 140 141 143 139   K 4.9 4.8 4.6 4.7 4.8 4.1    104 103 104 103 105   BUN 18 19 21 20 20 19   CREATININE 1.26 1.21 1.35* 1.27 1.37* 1.23   CO2 31 30 31 29 30 27   CALCIUM 9.3 9.3 9.0 9.1 9.8 9.0   PROT  --  6.0* 6.3* 6.2* 6.6 6.3*   BILITOT  --  0.6 0.7 0.5 0.8 0.4   ALKPHOS  --  61 61 61 73 71   ALT  --  16 16 21 26 22   AST  --  14 17 17 21 19   GLUCOSE 222* 164* 158* 135* 112* 144*      Lipid Panel:   Recent Labs     06/17/24  1506 09/08/23  0827 08/22/22  0914 01/29/21  1450 12/09/19  0847   CHOL 89 87 172 180 172   HDL 30.3 28.9* 28.9* 30.8* 27.9*   CHHDL 2.9 3.0 6.0* 5.8* 6.2*   VLDL 37 23 49* 31 36   TRIG 187* 115 243* 153* 178*   NHDL 59  --  143  --   --      Cardiac       No lab exists for component: \"CK\", \"CKMBP\"   Hemoglobin A1C:   Recent Labs     06/17/24  1506 09/08/23  0827 08/22/22  0914   HGBA1C 7.1* 6.9* 6.9*     TSH/ Free T4:   Recent Labs     06/17/24  1506 09/08/23  0827 08/22/22  0914 01/29/21  1450 12/09/19  0847 07/06/18  1521   TSH 5.43* 5.45* 7.43* 5.75* 9.85* 5.06*   FREET4 0.98 0.90 1.04 0.97 0.91 1.09     Iron: No results for input(s): \"FERRITIN\", \"TIBC\", \"IRONSAT\", \"BNP\" in the last 47709 hours.  Coag:   " "  ABO: No results found for: \"ABO\"    Past Cardiology Tests (Last 3 Years):    EKG:  Recent Labs     07/23/24  1600 07/03/24  0820   ATRRATE 375 326   VENTRATE 62 55   QRSDUR 90 76   QTCFRED 390 367   QTCCALCB 391 361     Encounter Date: 07/23/24   ECG 12 Lead   Result Value    Ventricular Rate 62    Atrial Rate 375    QRS Duration 90    QT Interval 386    QTC Calculation(Bazett) 391    R Axis -4    T Axis 30    QRS Count 10    Q Onset 219    T Offset 412    QTC Fredericia 390    Narrative    Atrial fibrillation  Abnormal ECG  When compared with ECG of 03-JUL-2024 08:30,  No significant change was found  Confirmed by Nickolas Bajwa ([id number]) on 7/24/2024 11:35:45 AM     Echo:  Echocardiogram:   ECHOCARDIOGRAM     Narrative  Ordered by an unspecified provider.    Ejection Fractions:  No results found for: \"EF\"  Cath:  Coronary Angiography: No results found for this or any previous visit from the past 1800 days.    Right Heart Cath: No results found for this or any previous visit from the past 1800 days.    Stress Test:  Nuclear:No results found for this or any previous visit from the past 1800 days.    Metabolic Stress: No results found for this or any previous visit from the past 1800 days.    Cardiac Imaging:  Cardiac Scoring:   CT HEART CALCIUM SCORING WO IV CONTRAST 03/05/2021    FINDINGS:  The score and distribution of calcium in the coronary arteries is as  follows:    LM            0  LAD           0  LCx           0  RCA           0    Total          0      The heart size is normal and there is no pericardial effusion. There  is dilatation of the ascending aorta measuring 4.2 cm in diameter.  This appears slightly increased from the prior exam.    The visualized lungs are clear. The visualized upper abdominal  contents are within normal limits.  The bony structures are intact.    Impression  Coronary artery calcium score of  0.  This is similar to the previous exam, also with a score 0.    Cardiac MRI: No results " found for this or any previous visit from the past 1800 days.         Assessment/Plan  Assessment/Plan   Principal Problem:    Paroxysmal atrial fibrillation (Multi)  Active Problems:    Benign essential hypertension    GERD (gastroesophageal reflux disease)    Hypothyroidism    Type 2 diabetes mellitus (Multi)    Suspected sleep apnea        atrial fibrillation on eliquis  -RFA with Dr. Bajwa on 9/12/24  -plan to dc home on protonix prophylaxis if able to tolerate for 30 days post procedure (patient reports he does not remember reaction to omeprazole and is amenable to trying protonix. Instructed patient to have benadryl at home if case he develops rash and informed ok to stop if he develops a rash or any other reaction to protonix))       NP discussed with Dr. Bajwa regarding plan of care/ discharge plan      I spent 30 minutes in the professional and overall care of this patient.      Marco Antonio Ag, APRN-CNP, DNP

## 2024-09-11 NOTE — ANESTHESIA PREPROCEDURE EVALUATION
Patient: Eligio Hogue    Procedure Information       Date/Time: 09/12/24 0830    Procedure: Ablation A-Fib    Location: [facility]    Providers: Nickolas Bajwa MD                                                         Pre- Anesthesia Evaluation                                            Eligio Hogue is a 64 y.o. male who presents for the above mentioned procedure due to Paroxysmal atrial fibrillation (Multi) [I48.0]    Past Medical History:   Diagnosis Date    A-fib (Multi)     Atrial flutter (Multi) 09/26/2023    Benign essential hypertension 06/10/2024    DM2 (diabetes mellitus, type 2) (Multi)     GERD (gastroesophageal reflux disease) 06/10/2024    Hyperlipidemia 06/10/2024    Hypothyroidism 06/10/2024    Other chest pain 02/16/2021    Chest tightness or pressure    Personal history of other benign neoplasm     History of benign neoplasm of pharynx    Suspected sleep apnea 06/14/2024     Past Surgical History:   Procedure Laterality Date    OTHER SURGICAL HISTORY  03/10/2016    Excision Of Tracheal Tumor    OTHER SURGICAL HISTORY  08/09/2018    Oral Surgery Tooth Extraction [place] [facility]     Social History   He reports that he has quit smoking. His smoking use included cigarettes. He has never used smokeless tobacco. He reports current alcohol use. He reports that he does not use drugs.    Allergies and Medications   Allergies   Allergen Reactions    Penicillins Other     Blisters in mouth      Omeprazole Rash     Current Outpatient Medications   Medication Instructions    apixaban (ELIQUIS) 5 mg, oral, 2 times daily    cephalexin (KEFLEX) 500 mg, oral, 2 times daily    losartan (COZAAR) 50 mg, oral, Daily    metFORMIN XR (GLUCOPHAGE-XR) 500 mg, oral, 2 times daily (morning and late afternoon)    metoprolol tartrate (LOPRESSOR) 25 mg, oral, 2 times daily    rosuvastatin (CRESTOR) 20 mg, oral, Daily    tamsulosin (FLOMAX) 0.4 mg, oral, Daily       Recent Labs     08/30/24  1043  "06/17/24  1506   WBC 9.4 8.3   HGB 13.9 13.6   HCT 43.1 42.6    284   MCV 88 92     Recent Labs     08/30/24  1044 06/17/24  1506 09/08/23  0827   EGFR 63 66  --    ANIONGAP 12 13 11   BUN 18 19 21   CREATININE 1.26 1.21 1.35*    142 140   K 4.9 4.8 4.6    104 103   CO2 31 30 31   GLUCOSE 222* 164* 158*     Recent Labs     06/17/24  1506 09/08/23  0827   PROT 6.0* 6.3*   ALBUMIN 4.0 4.0   BILITOT 0.6 0.7   ALKPHOS 61 61   ALT 16 16   AST 14 17     Recent Labs     06/17/24  1506 09/08/23  0827   HGBA1C 7.1* 6.9*   TSH 5.43* 5.45*   FREET4 0.98 0.90     Recent Labs     08/30/24  1044 06/17/24  1506   CALCIUM 9.3 9.3       == 12/19/22 ===    XR CERVICAL SPINE COMPLETE 4-5 VIEWS    - Impression -  Mild neuroforaminal narrowing on the left at C5-6 and C6-7. Mild to  moderate spondylosis in the lower cervical spine  EKG   Results for orders placed in visit on 07/23/24    ECG 12 Lead    Narrative  Atrial fibrillation  Abnormal ECG  When compared with ECG of 03-JUL-2024 08:30,  No significant change was found  Confirmed by Nickolas Bajwa ([id number]) on 7/24/2024 11:35:45 AM      CT HEART CALCIUM SCORING WO IV CONTRAST 03/05/2021  Impression  Coronary artery calcium score of  0.  This is similar to the previous exam, also with a score 0.    CT CHEST WO IV CONTRAST    - Impression -  1.  Mild aneurysmal dilatation of the ascending thoracic aorta  measuring up to 4.1 cm in diameter.  2. 4 mm nodule in the right upper lobe is unchanged from prior  imaging. Given long-term stability no further follow-up is needed        Visit Vitals  /85   Pulse 72   Temp 35.2 °C (95.4 °F)   Resp 18   Ht 1.905 m (6' 3\")   Wt (!) 163 kg (360 lb)   SpO2 96%   BMI 45.00 kg/m²   Smoking Status Former   BSA 2.94 m²     Medical Gas Therapy: None (Room air)         Relevant Problems   Cardiac   (+) Atrial flutter (Multi)   (+) Benign essential hypertension   (+) Hyperlipidemia   (+) Paroxysmal atrial fibrillation (Multi)      GI   (+) " GERD (gastroesophageal reflux disease)      Endocrine   (+) Hypothyroidism   (+) Type 2 diabetes mellitus (Multi)      Musculoskeletal   (+) Primary osteoarthritis of both feet       Clinical information reviewed:   Tobacco  Allergies  Meds  Problems  Med Hx  Surg Hx   Fam Hx  Soc   Hx        NPO Detail:  NPO/Void Status  Date of Last Liquid: 09/11/24  Date of Last Solid: 09/11/24         Physical Exam    Airway  Mallampati: III  TM distance: >3 FB  Neck ROM: full     Cardiovascular   Rhythm: irregular  Rate: normal     Dental   (+) upper dentures, lower dentures     Pulmonary   Comments: Normal RR  Non-labored respiration    Abdominal   (+) obese             Anesthesia Plan    History of general anesthesia?: yes  History of complications of general anesthesia?: no    ASA 3     general   (General with ETT. A line for continuous BP monitoring in addition to standard ASA monitors.)  The patient is not a current smoker.  Education provided regarding risk of obstructive sleep apnea.  intravenous induction   Anesthetic plan and risks discussed with patient.    Plan discussed with CAA and CRNA.

## 2024-09-12 ENCOUNTER — PHARMACY VISIT (OUTPATIENT)
Dept: PHARMACY | Facility: CLINIC | Age: 64
End: 2024-09-12
Payer: MEDICARE

## 2024-09-12 ENCOUNTER — ANESTHESIA (OUTPATIENT)
Dept: CARDIOLOGY | Facility: HOSPITAL | Age: 64
End: 2024-09-12
Payer: COMMERCIAL

## 2024-09-12 ENCOUNTER — HOSPITAL ENCOUNTER (OUTPATIENT)
Facility: HOSPITAL | Age: 64
Setting detail: OUTPATIENT SURGERY
Discharge: HOME | End: 2024-09-12
Attending: INTERNAL MEDICINE | Admitting: INTERNAL MEDICINE
Payer: COMMERCIAL

## 2024-09-12 VITALS
HEIGHT: 75 IN | OXYGEN SATURATION: 96 % | DIASTOLIC BLOOD PRESSURE: 78 MMHG | RESPIRATION RATE: 16 BRPM | WEIGHT: 315 LBS | BODY MASS INDEX: 39.17 KG/M2 | HEART RATE: 77 BPM | TEMPERATURE: 97.7 F | SYSTOLIC BLOOD PRESSURE: 119 MMHG

## 2024-09-12 DIAGNOSIS — Z86.79 STATUS POST RADIOFREQUENCY ABLATION (RFA) OPERATION FOR ARRHYTHMIA: ICD-10-CM

## 2024-09-12 DIAGNOSIS — I48.0 PAROXYSMAL ATRIAL FIBRILLATION (MULTI): Primary | ICD-10-CM

## 2024-09-12 DIAGNOSIS — Z98.890 STATUS POST RADIOFREQUENCY ABLATION (RFA) OPERATION FOR ARRHYTHMIA: ICD-10-CM

## 2024-09-12 LAB
ACT BLD: 131 SEC (ref 82–174)
ACT BLD: 151 SEC (ref 82–174)
ACT BLD: 261 SEC (ref 82–174)
ACT BLD: 285 SEC (ref 82–174)
ACT BLD: 288 SEC (ref 82–174)
ACT BLD: 288 SEC (ref 82–174)
ACT BLD: 321 SEC (ref 82–174)
ACT BLD: 353 SEC (ref 82–174)
ACT BLD: 358 SEC (ref 82–174)
GLUCOSE BLD MANUAL STRIP-MCNC: 155 MG/DL (ref 74–99)
GLUCOSE BLD MANUAL STRIP-MCNC: 178 MG/DL (ref 74–99)

## 2024-09-12 PROCEDURE — C1759 CATH, INTRA ECHOCARDIOGRAPHY: HCPCS | Performed by: INTERNAL MEDICINE

## 2024-09-12 PROCEDURE — 2500000004 HC RX 250 GENERAL PHARMACY W/ HCPCS (ALT 636 FOR OP/ED): Performed by: ANESTHESIOLOGY

## 2024-09-12 PROCEDURE — 2500000005 HC RX 250 GENERAL PHARMACY W/O HCPCS: Performed by: NURSE PRACTITIONER

## 2024-09-12 PROCEDURE — 2500000004 HC RX 250 GENERAL PHARMACY W/ HCPCS (ALT 636 FOR OP/ED): Performed by: NURSE PRACTITIONER

## 2024-09-12 PROCEDURE — 2720000007 HC OR 272 NO HCPCS: Performed by: INTERNAL MEDICINE

## 2024-09-12 PROCEDURE — 2500000005 HC RX 250 GENERAL PHARMACY W/O HCPCS: Performed by: INTERNAL MEDICINE

## 2024-09-12 PROCEDURE — C1732 CATH, EP, DIAG/ABL, 3D/VECT: HCPCS | Performed by: INTERNAL MEDICINE

## 2024-09-12 PROCEDURE — C1766 INTRO/SHEATH,STRBLE,NON-PEEL: HCPCS | Performed by: INTERNAL MEDICINE

## 2024-09-12 PROCEDURE — RXMED WILLOW AMBULATORY MEDICATION CHARGE

## 2024-09-12 PROCEDURE — 7100000010 HC PHASE TWO TIME - EACH INCREMENTAL 1 MINUTE: Performed by: INTERNAL MEDICINE

## 2024-09-12 PROCEDURE — 82947 ASSAY GLUCOSE BLOOD QUANT: CPT

## 2024-09-12 PROCEDURE — 7100000002 HC RECOVERY ROOM TIME - EACH INCREMENTAL 1 MINUTE: Performed by: INTERNAL MEDICINE

## 2024-09-12 PROCEDURE — C1893 INTRO/SHEATH, FIXED,NON-PEEL: HCPCS | Performed by: INTERNAL MEDICINE

## 2024-09-12 PROCEDURE — 7100000001 HC RECOVERY ROOM TIME - INITIAL BASE CHARGE: Performed by: INTERNAL MEDICINE

## 2024-09-12 PROCEDURE — 93656 COMPRE EP EVAL ABLTJ ATR FIB: CPT | Performed by: INTERNAL MEDICINE

## 2024-09-12 PROCEDURE — 7100000009 HC PHASE TWO TIME - INITIAL BASE CHARGE: Performed by: INTERNAL MEDICINE

## 2024-09-12 PROCEDURE — C1730 CATH, EP, 19 OR FEW ELECT: HCPCS | Performed by: INTERNAL MEDICINE

## 2024-09-12 PROCEDURE — 85347 COAGULATION TIME ACTIVATED: CPT

## 2024-09-12 PROCEDURE — 2500000004 HC RX 250 GENERAL PHARMACY W/ HCPCS (ALT 636 FOR OP/ED): Performed by: ANESTHESIOLOGIST ASSISTANT

## 2024-09-12 PROCEDURE — 3700000001 HC GENERAL ANESTHESIA TIME - INITIAL BASE CHARGE: Performed by: INTERNAL MEDICINE

## 2024-09-12 PROCEDURE — 2500000005 HC RX 250 GENERAL PHARMACY W/O HCPCS: Performed by: ANESTHESIOLOGIST ASSISTANT

## 2024-09-12 PROCEDURE — 93662 INTRACARDIAC ECG (ICE): CPT | Performed by: INTERNAL MEDICINE

## 2024-09-12 PROCEDURE — C1731 CATH, EP, 20 OR MORE ELEC: HCPCS | Performed by: INTERNAL MEDICINE

## 2024-09-12 PROCEDURE — 3700000002 HC GENERAL ANESTHESIA TIME - EACH INCREMENTAL 1 MINUTE: Performed by: INTERNAL MEDICINE

## 2024-09-12 PROCEDURE — 93621 COMP EP EVL L PAC&REC C SINS: CPT | Performed by: INTERNAL MEDICINE

## 2024-09-12 PROCEDURE — 92960 CARDIOVERSION ELECTRIC EXT: CPT | Mod: XP | Performed by: INTERNAL MEDICINE

## 2024-09-12 PROCEDURE — 99223 1ST HOSP IP/OBS HIGH 75: CPT | Performed by: NURSE PRACTITIONER

## 2024-09-12 RX ORDER — PROTAMINE SULFATE 10 MG/ML
20 INJECTION, SOLUTION INTRAVENOUS ONCE
Status: COMPLETED | OUTPATIENT
Start: 2024-09-12 | End: 2024-09-12

## 2024-09-12 RX ORDER — SODIUM CHLORIDE 9 MG/ML
100 INJECTION, SOLUTION INTRAVENOUS CONTINUOUS
Status: DISCONTINUED | OUTPATIENT
Start: 2024-09-12 | End: 2024-09-12

## 2024-09-12 RX ORDER — PHENYLEPHRINE HCL IN 0.9% NACL 0.4MG/10ML
SYRINGE (ML) INTRAVENOUS AS NEEDED
Status: DISCONTINUED | OUTPATIENT
Start: 2024-09-12 | End: 2024-09-12

## 2024-09-12 RX ORDER — ACETAMINOPHEN 325 MG/1
650 TABLET ORAL EVERY 4 HOURS PRN
Status: DISCONTINUED | OUTPATIENT
Start: 2024-09-12 | End: 2024-09-12 | Stop reason: HOSPADM

## 2024-09-12 RX ORDER — FENTANYL CITRATE 50 UG/ML
INJECTION, SOLUTION INTRAMUSCULAR; INTRAVENOUS CONTINUOUS PRN
Status: DISCONTINUED | OUTPATIENT
Start: 2024-09-12 | End: 2024-09-12

## 2024-09-12 RX ORDER — NORETHINDRONE AND ETHINYL ESTRADIOL 0.5-0.035
KIT ORAL AS NEEDED
Status: DISCONTINUED | OUTPATIENT
Start: 2024-09-12 | End: 2024-09-12

## 2024-09-12 RX ORDER — PHENYLEPHRINE 10 MG/250 ML(40 MCG/ML)IN 0.9 % SOD.CHLORIDE INTRAVENOUS
CONTINUOUS PRN
Status: DISCONTINUED | OUTPATIENT
Start: 2024-09-12 | End: 2024-09-12

## 2024-09-12 RX ORDER — PANTOPRAZOLE SODIUM 40 MG/1
40 TABLET, DELAYED RELEASE ORAL DAILY
Qty: 30 TABLET | Refills: 0 | Status: SHIPPED | OUTPATIENT
Start: 2024-09-12 | End: 2024-10-12

## 2024-09-12 RX ORDER — PROTAMINE SULFATE 10 MG/ML
INJECTION, SOLUTION INTRAVENOUS AS NEEDED
Status: DISCONTINUED | OUTPATIENT
Start: 2024-09-12 | End: 2024-09-12

## 2024-09-12 RX ORDER — MIDAZOLAM HYDROCHLORIDE 1 MG/ML
INJECTION INTRAMUSCULAR; INTRAVENOUS AS NEEDED
Status: DISCONTINUED | OUTPATIENT
Start: 2024-09-12 | End: 2024-09-12

## 2024-09-12 RX ORDER — HEPARIN SODIUM 1000 [USP'U]/ML
INJECTION, SOLUTION INTRAVENOUS; SUBCUTANEOUS AS NEEDED
Status: DISCONTINUED | OUTPATIENT
Start: 2024-09-12 | End: 2024-09-12

## 2024-09-12 RX ORDER — HEPARIN SODIUM 200 [USP'U]/100ML
INJECTION, SOLUTION INTRAVENOUS CONTINUOUS PRN
Status: DISCONTINUED | OUTPATIENT
Start: 2024-09-12 | End: 2024-09-12

## 2024-09-12 RX ORDER — LIDOCAINE HYDROCHLORIDE 20 MG/ML
INJECTION, SOLUTION INFILTRATION; PERINEURAL AS NEEDED
Status: DISCONTINUED | OUTPATIENT
Start: 2024-09-12 | End: 2024-09-12

## 2024-09-12 RX ORDER — FLECAINIDE ACETATE 150 MG/1
150 TABLET ORAL 2 TIMES DAILY
Qty: 180 TABLET | Refills: 0 | Status: SHIPPED | OUTPATIENT
Start: 2024-09-12

## 2024-09-12 RX ORDER — PHENYLEPHRINE 10 MG/250 ML(40 MCG/ML)IN 0.9 % SOD.CHLORIDE INTRAVENOUS
0.3 CONTINUOUS
Status: DISPENSED | OUTPATIENT
Start: 2024-09-12 | End: 2024-09-12

## 2024-09-12 RX ORDER — ONDANSETRON HYDROCHLORIDE 2 MG/ML
INJECTION, SOLUTION INTRAVENOUS AS NEEDED
Status: DISCONTINUED | OUTPATIENT
Start: 2024-09-12 | End: 2024-09-12

## 2024-09-12 RX ORDER — LIDOCAINE HYDROCHLORIDE 10 MG/ML
INJECTION, SOLUTION EPIDURAL; INFILTRATION; INTRACAUDAL; PERINEURAL AS NEEDED
Status: DISCONTINUED | OUTPATIENT
Start: 2024-09-12 | End: 2024-09-12 | Stop reason: HOSPADM

## 2024-09-12 RX ORDER — ROCURONIUM BROMIDE 10 MG/ML
INJECTION, SOLUTION INTRAVENOUS AS NEEDED
Status: DISCONTINUED | OUTPATIENT
Start: 2024-09-12 | End: 2024-09-12

## 2024-09-12 RX ORDER — ONDANSETRON HYDROCHLORIDE 2 MG/ML
4 INJECTION, SOLUTION INTRAVENOUS EVERY 8 HOURS PRN
Status: DISCONTINUED | OUTPATIENT
Start: 2024-09-12 | End: 2024-09-12 | Stop reason: HOSPADM

## 2024-09-12 RX ORDER — PROPOFOL 10 MG/ML
INJECTION, EMULSION INTRAVENOUS AS NEEDED
Status: DISCONTINUED | OUTPATIENT
Start: 2024-09-12 | End: 2024-09-12

## 2024-09-12 RX ORDER — ONDANSETRON 4 MG/1
4 TABLET, FILM COATED ORAL EVERY 8 HOURS PRN
Status: DISCONTINUED | OUTPATIENT
Start: 2024-09-12 | End: 2024-09-12 | Stop reason: HOSPADM

## 2024-09-12 SDOH — HEALTH STABILITY: MENTAL HEALTH: CURRENT SMOKER: 0

## 2024-09-12 ASSESSMENT — PAIN SCALES - GENERAL
PAINLEVEL_OUTOF10: 0 - NO PAIN

## 2024-09-12 ASSESSMENT — PAIN - FUNCTIONAL ASSESSMENT

## 2024-09-12 ASSESSMENT — ENCOUNTER SYMPTOMS
CARDIOVASCULAR NEGATIVE: 1
ENDOCRINE NEGATIVE: 1
ALLERGIC/IMMUNOLOGIC NEGATIVE: 1
HEMATOLOGIC/LYMPHATIC NEGATIVE: 1
EYES NEGATIVE: 1
SHORTNESS OF BREATH: 1
PSYCHIATRIC NEGATIVE: 1
GASTROINTESTINAL NEGATIVE: 1
FATIGUE: 1
MUSCULOSKELETAL NEGATIVE: 1
NEUROLOGICAL NEGATIVE: 1

## 2024-09-12 NOTE — DISCHARGE INSTRUCTIONS
-----PLEASE RESUME ELIQUIS TONIGHT 9/12/24 ------    Please do not miss any doses of your anticoagulant (Eliquis). This is very important to prevent the risk of stroke.

## 2024-09-12 NOTE — SIGNIFICANT EVENT
ACT=131 per nursing. NP discussed with Dr. Bajwa who requested protamine 20mg IV once to be administered. Will keep figure of eight sutures in place for another 1 hour and then attempt to remove again at that time.

## 2024-09-12 NOTE — ANESTHESIA PROCEDURE NOTES
Peripheral IV  Date/Time: 9/12/2024 8:40 AM  Inserted by: ABDOULAYE Samaniego    Placement  Needle size: 18 G  Laterality: left  Location: hand  Local anesthetic: none  Site prep: alcohol  Technique: anatomical landmarks  Attempts: 1

## 2024-09-12 NOTE — PERIOPERATIVE NURSING NOTE
1429 a LINE removed and pressure held for 5 minutes. No signs of bleeding. Pressure dressing applied.    1428 meds to beds at bedside for medication delivery    1600 Marco Antonio CNP at bedside for figure 8 removal.     1640 Pt family brought back to bedside. Everyone updated on plan of care.    1550 Protamine sulfate started at this time.    1657 Discharge instructions provided to pt and family. Educated on medications, effects of anesthesia, and homecoming care. Pt and family verbalizing understanding of all instructions provided at this time. All questions and concerns answered. Pt given contact information for provider.

## 2024-09-12 NOTE — SIGNIFICANT EVENT
NP at bedside to assist with removal of figure-of-eight sutures.  Upon examination, both sites bled quite substantially when stopcock was loosened.  Both sites stopcocks were tightened back down  (Right site hemostasis was immediate with tightening the stopcock, Left site gushed substantially and required manual compression for approximately 5 minutes to obtain hemostasis).  NP discussed with Dr. Bajwa and it was determined that an ACT would be obtained.  2+ DP and PT pulses bilaterally.

## 2024-09-12 NOTE — Clinical Note
Sheath was exchanged in the right femoral vein with INTRODUCER, TRANSSEPTAL GUIDE, YVETTE, SL-1 8.5 X 63 CM.

## 2024-09-12 NOTE — ANESTHESIA POSTPROCEDURE EVALUATION
Patient: Eligio Hogue    Procedure Summary       Date: 09/12/24 Room / Location: [facility] / [facility]    Anesthesia Start: 0812 Anesthesia Stop: 1211    Procedure: Ablation A-Fib Diagnosis:       Paroxysmal atrial fibrillation (Multi)      (Paroxysmal atrial fibrillation (Multi) [I48.0])    Providers: Nickolas Bajwa MD Responsible Provider: Josephine Yusuf MD    Anesthesia Type: general ASA Status: 3            Anesthesia Type: general    Vitals Value Taken Time   /62 09/12/24 1334   Temp normal 09/12/24 1339   Pulse 80 09/12/24 1339   Resp 16 09/12/24 1330   SpO2 93 % 09/12/24 1339   Vitals shown include unfiled device data.    Anesthesia Post Evaluation    Patient location during evaluation: PACU  Patient participation: complete - patient participated  Level of consciousness: awake  Pain management: satisfactory to patient  Multimodal analgesia pain management approach  Airway patency: patent  Cardiovascular status: hemodynamically stable  Respiratory status: spontaneous ventilation  Hydration status: euvolemic  Postoperative Nausea and Vomiting: none        There were no known notable events for this encounter.

## 2024-09-12 NOTE — Clinical Note
Closure device placed in the left femoral vein. Site closed by suture. Left and right femoral vein closed using figure 8 suture to left and right groin

## 2024-09-12 NOTE — ANESTHESIA PROCEDURE NOTES
Airway  Date/Time: 9/12/2024 8:37 AM  Urgency: elective    Airway not difficult    Staffing  Performed: ABDOULAYE   Authorized by: Josephine Yusuf MD    Performed by: ABDOULAYE Samaniego  Patient location during procedure: OR    Indications and Patient Condition  Indications for airway management: anesthesia  Spontaneous ventilation: present  Sedation level: deep  Preoxygenated: yes  Patient position: sniffing  Mask difficulty assessment: 3 - difficult mask (inadequate, unstable or two providers) +/- NMBA    Final Airway Details  Final airway type: endotracheal airway      Successful airway: ETT  Cuffed: yes   Successful intubation technique: direct laryngoscopy  Facilitating devices/methods: intubating stylet and cricoid pressure  Endotracheal tube insertion site: oral  Blade: Renu  Blade size: #4  ETT size (mm): 7.5  Cormack-Lehane Classification: grade IIa - partial view of glottis  Placement verified by: chest auscultation and capnometry   Measured from: gums  ETT to gums (cm): 23  Number of attempts at approach: 1    Additional Comments  Difficult mask; two person, two handed mask with oral airway

## 2024-09-16 ENCOUNTER — APPOINTMENT (OUTPATIENT)
Dept: PRIMARY CARE | Facility: CLINIC | Age: 64
End: 2024-09-16
Payer: COMMERCIAL

## 2024-09-16 VITALS — SYSTOLIC BLOOD PRESSURE: 142 MMHG | DIASTOLIC BLOOD PRESSURE: 76 MMHG

## 2024-09-16 DIAGNOSIS — E11.69 TYPE 2 DIABETES MELLITUS WITH OTHER SPECIFIED COMPLICATION, WITHOUT LONG-TERM CURRENT USE OF INSULIN: ICD-10-CM

## 2024-09-16 DIAGNOSIS — R33.9 URINARY RETENTION: ICD-10-CM

## 2024-09-16 DIAGNOSIS — G47.33 OSA ON CPAP: ICD-10-CM

## 2024-09-16 DIAGNOSIS — I48.0 PAROXYSMAL ATRIAL FIBRILLATION (MULTI): Primary | ICD-10-CM

## 2024-09-16 DIAGNOSIS — E78.5 HYPERLIPIDEMIA, UNSPECIFIED: ICD-10-CM

## 2024-09-16 DIAGNOSIS — I10 PRIMARY HYPERTENSION: ICD-10-CM

## 2024-09-16 PROCEDURE — 4010F ACE/ARB THERAPY RXD/TAKEN: CPT | Performed by: STUDENT IN AN ORGANIZED HEALTH CARE EDUCATION/TRAINING PROGRAM

## 2024-09-16 PROCEDURE — 3061F NEG MICROALBUMINURIA REV: CPT | Performed by: STUDENT IN AN ORGANIZED HEALTH CARE EDUCATION/TRAINING PROGRAM

## 2024-09-16 PROCEDURE — 3048F LDL-C <100 MG/DL: CPT | Performed by: STUDENT IN AN ORGANIZED HEALTH CARE EDUCATION/TRAINING PROGRAM

## 2024-09-16 PROCEDURE — 99214 OFFICE O/P EST MOD 30 MIN: CPT | Performed by: STUDENT IN AN ORGANIZED HEALTH CARE EDUCATION/TRAINING PROGRAM

## 2024-09-16 PROCEDURE — 3077F SYST BP >= 140 MM HG: CPT | Performed by: STUDENT IN AN ORGANIZED HEALTH CARE EDUCATION/TRAINING PROGRAM

## 2024-09-16 PROCEDURE — 3078F DIAST BP <80 MM HG: CPT | Performed by: STUDENT IN AN ORGANIZED HEALTH CARE EDUCATION/TRAINING PROGRAM

## 2024-09-16 PROCEDURE — 3051F HG A1C>EQUAL 7.0%<8.0%: CPT | Performed by: STUDENT IN AN ORGANIZED HEALTH CARE EDUCATION/TRAINING PROGRAM

## 2024-09-16 RX ORDER — ROSUVASTATIN CALCIUM 20 MG/1
20 TABLET, COATED ORAL DAILY
Qty: 90 TABLET | Refills: 1 | Status: SHIPPED | OUTPATIENT
Start: 2024-09-16

## 2024-09-16 RX ORDER — METFORMIN HYDROCHLORIDE 500 MG/1
500 TABLET, EXTENDED RELEASE ORAL
Qty: 180 TABLET | Refills: 1 | Status: SHIPPED | OUTPATIENT
Start: 2024-09-16

## 2024-09-16 RX ORDER — LOSARTAN POTASSIUM 50 MG/1
50 TABLET ORAL DAILY
Qty: 90 TABLET | Refills: 1 | Status: SHIPPED | OUTPATIENT
Start: 2024-09-16

## 2024-09-16 NOTE — PROGRESS NOTES
Subjective   Patient ID: Eligio Hogue is a 64 y.o. male who presents for Follow-up.    HPI   Routine fu.    He had an ablation done for afib, following with urology for urinary retention and needs to make appointment with GI for colonoscopy considering large Piecemeal removal of 20cm Polyp.    Review of Systems  Comprehensive 10-point review of systems negative otherwise as noted above in HPI    Objective   /76     Physical Exam  Constitutional: Well developed,  no distress, alert and cooperative  Respiratory/Thorax: Patent airways, CTAB,   Cardiovascular: Regular, rate and rhythm, S1/S2 present  Gastrointestinal: Nondistended, soft, non-tender,   Neurological: alert and oriented x3,   MSK: Full range of motion, no tenderness  Psychological: Appropriate mood and behavior  Skin: Warm and dry, no lesions or bruises    Assessment/Plan     #Urinary Retention- Suspected BPH, symptoms improved with tamsulosin and abx. Following with urology and getting a prostate MRI done soon.    #FLOYD on CPAP- Following w Sleep Med.    #Afib: Following with Dr Hernandez for Cardiology, S/P Ablation for Afib 9/12/2024. C/b bleed s/p Protamine administration.    #fatigue/Severe obesity, class 3: Advised increased efforts at diet, exercise, weight loss.      #HTN: continue losartan     #HLD: continue statin     #DM2: advised increasing metformin dose, patient declines. He is considering GLP-1     #Elevated TSH: T4 within normal limits, will follow     #Health maintenance: colonoscopy 6 mo fu due 10/2024. He says that he has had AAA screening. Advised Shingrix, yearly flu shot. Pneumovax given 2023. Interval records, labs reviewed.     RTC 3 mo      09/16/24 at 3:10 PM - Guera Regalado MD

## 2024-09-19 ENCOUNTER — APPOINTMENT (OUTPATIENT)
Dept: PHARMACY | Facility: HOSPITAL | Age: 64
End: 2024-09-19
Payer: COMMERCIAL

## 2024-09-20 ENCOUNTER — HOSPITAL ENCOUNTER (OUTPATIENT)
Dept: RADIOLOGY | Facility: HOSPITAL | Age: 64
Discharge: HOME | End: 2024-09-20
Payer: COMMERCIAL

## 2024-09-20 VITALS — WEIGHT: 315 LBS | BODY MASS INDEX: 44.92 KG/M2

## 2024-09-20 DIAGNOSIS — R33.9 URINARY RETENTION: ICD-10-CM

## 2024-09-20 PROCEDURE — A9575 INJ GADOTERATE MEGLUMI 0.1ML: HCPCS | Performed by: UROLOGY

## 2024-09-20 PROCEDURE — 72197 MRI PELVIS W/O & W/DYE: CPT

## 2024-09-20 PROCEDURE — 2550000001 HC RX 255 CONTRASTS: Performed by: UROLOGY

## 2024-09-20 RX ORDER — GADOTERATE MEGLUMINE 376.9 MG/ML
30 INJECTION INTRAVENOUS
Status: COMPLETED | OUTPATIENT
Start: 2024-09-20 | End: 2024-09-20

## 2024-09-25 NOTE — PROGRESS NOTES
Patient: Eligio Hogue    [id number]  : 1960 -- AGE 64 y.o.    Provider: ADALBERTO Weiss     Location [facility]   Service Date: 2024              [facility]  Follow Up Visit Note      HISTORY OF PRESENT ILLNESS     The patient's referring provider is: No ref. provider found    HISTORY OF PRESENT ILLNESS   Eligio Hogue is a 64 y.o. male who presents to a [facility] for a sleep medicine evaluation with concerns of suspected FLOYD in presence of afib, smart watch tracking low oxygen at night. Works in IT.    The patient has h/o afib, HTN, hyperlipidemia, hypothyroidism, DM2, GERD, arthritis.     Past Sleep History  Patient has the following sleep-related diagnoses: 2024 Moderate FLOYD The REI3% was 22.7 events per hour. The REI4% was 13.9 events per hour. MIGUELANGEL was 0.3 event per hour. The SpO2 martir was 74%.   He was setup with CPAP per [facility] in 2024.     Current History    On today's visit, the patient reports adjusting to his machine. Feels he mask leaking at times. Notes the tubing down his cheeks sometimes bothers him when he wakes up in the night. Hears his breath and sometimes cannot fall back to sleep. Notes it is getting to be more comfortable in last week or so. Notes the nose connection sometimes leaks. Also has had the tubing disconnect from the mask one night. Generally feels he is sleeping more soundly. Does not feel much different during his day. Feels he is driving to work ok. Continues to feel tired while at work. Sometimes dozes off at his desk when he works Sundays. Notes decrease in exertional capacity since starting cardiac meds. Cannot walk far without needed a rest. Does not feel his legs are getting enough energy to work right.  Has been tracking his data online. Also watching his smart watch information which is reporting less awakenings in his sleep.  Asking if air  pressure can start higher. Feels like he needs to open his mouth to catch his breath when the machine starts up.   Notes he has nasal congestion at night at times that makes CPAP uncomfortable. Endorses postnasal drip.   Willing to continue PAP.    Sleep schedule  on weekdays / work days:  Usual Bedtime:    11:30 pm  Falls asleep around:  45 min  # of awakenings: 1-2x per night  Wake time:  5-6    Naps: none     Preferred sleeping position: back    Sleep-related ROS:    Problems going to sleep: No problems going to sleep    Sleep Maintenance: denies problematic awakenings  Problems staying asleep Problems Staying Asleep: nocturia    Breathing during sleep: snoring, snorting during sleep, witnessed apneas, and night sweats-- improved now on PAP machine     RLS screen:  RLSSCREEN: - Sensations: Patient has unusual sensations in their extremities that cause an urge to move them  Notes aches in in his legs at times, able to stretch out on the couch and helps - bothersome with activity     Sleep-related behaviors:   DENIES    Daytime Symptoms  On awakening patient reports: wake unrefreshed, feels sleepy, morning headaches, morning dry mouth, and morning sore throat- all improved with CPAP    ESS: 5   MEHRDAD: 9  FOSQ:  34      REVIEW OF SYSTEMS     REVIEW OF SYSTEMS  Review of Systems   All other systems reviewed and are negative.      ALLERGIES AND MEDICATIONS     ALLERGIES  Allergies   Allergen Reactions    Penicillins Other     Blisters in mouth      Omeprazole Rash       MEDICATIONS  Current Outpatient Medications   Medication Sig Dispense Refill    apixaban (Eliquis) 5 mg tablet Take 1 tablet (5 mg) by mouth 2 times a day. 180 tablet 3    flecainide (Tambocor) 150 mg tablet Take 1 tablet (150 mg) by mouth 2 times a day. 180 tablet 0    losartan (Cozaar) 50 mg tablet Take 1 tablet (50 mg) by mouth once daily. 90 tablet 1    metFORMIN  mg 24 hr tablet Take 1 tablet (500 mg) by mouth 2 times daily (morning and late  afternoon). 180 tablet 1    metoprolol tartrate (Lopressor) 25 mg tablet Take 1 tablet (25 mg) by mouth 2 times a day. 180 tablet 1    rosuvastatin (Crestor) 20 mg tablet Take 1 tablet (20 mg) by mouth once daily. 90 tablet 1    tamsulosin (Flomax) 0.4 mg 24 hr capsule Take 1 capsule (0.4 mg) by mouth once daily. 30 capsule 11    fluticasone (Flonase) 50 mcg/actuation nasal spray Administer 1 spray into each nostril once daily. Shake gently. Before first use, prime pump. After use, clean tip and replace cap. 16 g 12    pantoprazole (ProtoNix) 40 mg EC tablet Take 1 tablet (40 mg) by mouth once daily. Do not crush, chew, or split. Take for 30 days post procedure then stop 30 tablet 0     No current facility-administered medications for this visit.         PAST HISTORY     PAST MEDICAL HISTORY  Past Medical History:   Diagnosis Date    A-fib (Multi)     Atrial flutter (Multi) 09/26/2023    Benign essential hypertension 06/10/2024    DM2 (diabetes mellitus, type 2) (Multi)     GERD (gastroesophageal reflux disease) 06/10/2024    Hyperlipidemia 06/10/2024    Hypothyroidism 06/10/2024    Other chest pain 02/16/2021    Chest tightness or pressure    Personal history of other benign neoplasm     History of benign neoplasm of pharynx    Suspected sleep apnea 06/14/2024       PAST SURGICAL HISTORY:  Past Surgical History:   Procedure Laterality Date    CARDIAC ELECTROPHYSIOLOGY PROCEDURE N/A 9/12/2024    Procedure: Ablation A-Fib;  Surgeon: Nickolas Bajwa MD;  [facility]: [facility] Cardiac [facility] Lab;  Service: Electrophysiology;  Laterality: N/A;    OTHER SURGICAL HISTORY  03/10/2016    Excision Of Tracheal Tumor    OTHER SURGICAL HISTORY  08/09/2018    Oral Surgery Tooth Extraction [facility]       FAMILY HISTORY  Family History   Problem Relation Name Age of Onset    Heart disease Mother      Diabetes Brother         SOCIAL HISTORY  He  reports that he has quit smoking. His smoking use included cigarettes. He has never used  "smokeless tobacco. He reports current alcohol use. He reports that he does not use drugs. He currently lives with his wife.       PHYSICAL EXAM     VITAL SIGNS: /79 (BP Location: Left arm, Patient Position: Sitting, BP Cuff Size: Large adult long)   Pulse 66   Ht 1.905 m (6' 3\")   Wt (!) 165 kg (364 lb 8 oz)   SpO2 96%   BMI 45.56 kg/m²      PREVIOUS WEIGHTS:  Wt Readings from Last 3 Encounters:   09/26/24 (!) 165 kg (364 lb 8 oz)   09/20/24 (!) 163 kg (359 lb 5.6 oz)   09/12/24 (!) 163 kg (360 lb)       Physical Exam  Physical Exam   Constitutional: Alert and oriented, cooperative, no obvious distress   HEENT: Non icteric or anemic, EOM WNL bilaterally   Neck: Supple, no JVD, no goiter, no adenopathy, no rigidity  Chest: CTA bilaterally, no wheezing, crackles, rubs   Cardiac: RRR, S1 and S2, no murmur, rub, thrill   Abdomen: Obese, Soft, nontender, no masses, no organomegaly   Extremities: No clubbing, no LL edema   Neuromuscular: Cranial nerves grossly intact, no focal deficits        RESULTS/DATA     Bicarbonate (mmol/L)   Date Value   08/30/2024 31   06/17/2024 30   09/08/2023 31   08/22/2022 29   01/29/2021 30     PAP Adherence:    Airsense 11-8/20/2024  N30i- 8/20/2024      ASSESSMENT/PLAN     Mr. Hogue is a 64 y.o. male and He was referred to the [facility] for evaluation of FLOYD.    Problem List and Orders  Problem List Items Addressed This Visit             ICD-10-CM    Benign essential hypertension I10     BP elevated today  Follow up with cardiology/ PCP for medication adjustment         Daytime sleepiness R40.0     Improving with CPAP  ESS 5 today.   Will continue to monitor.         Nasal congestion - Primary R09.81    Relevant Medications    fluticasone (Flonase) 50 mcg/actuation nasal spray    FLOYD (obstructive sleep apnea) G47.33     Moderate per HSAT completed in July 2024 with REI3% was 22.7 events per hour. The SpO2 martir was 74%.   -Well controlled on " current settings  -Adjusted to 7-15 EPR of 3 for comfort.   -Recommended trial of fluticasone PRN nasal congestion. He was agreeable. Order to [facility] today.  -Supply order updated today  -Discussed mask alternatives. He would prefer to continue with style he has. Recommended considering small cushion if leak persists. May benefit from chin strap or mouth tape. Or change to P30i             Relevant Orders    Positive Airway Pressure (PAP) Therapy    Wheezing R06.2     Endorses wheezing at times at night + postnasal drip  Resting awake pulse ox ~ 94%  Will order PFTs - plan for pulm referral pending results          Relevant Orders    Complete Pulmonary Function Test Pre/Post Bronchodialator (Spirometry Pre/Post/DLCO/Lung Volumes)    BMI 45.0-49.9, adult (Multi) Z68.42     Weight loss recommended  Follow up with PCP for recommendations          Other Visit Diagnoses         Codes    Shortness of breath     R06.02    Relevant Orders    Complete Pulmonary Function Test Pre/Post Bronchodialator (Spirometry Pre/Post/DLCO/Lung Volumes)

## 2024-09-26 ENCOUNTER — OFFICE VISIT (OUTPATIENT)
Dept: SLEEP MEDICINE | Facility: CLINIC | Age: 64
End: 2024-09-26
Payer: COMMERCIAL

## 2024-09-26 VITALS
HEIGHT: 75 IN | WEIGHT: 315 LBS | SYSTOLIC BLOOD PRESSURE: 143 MMHG | OXYGEN SATURATION: 96 % | DIASTOLIC BLOOD PRESSURE: 79 MMHG | BODY MASS INDEX: 39.17 KG/M2 | HEART RATE: 66 BPM

## 2024-09-26 DIAGNOSIS — R40.0 DAYTIME SLEEPINESS: ICD-10-CM

## 2024-09-26 DIAGNOSIS — G47.33 OSA (OBSTRUCTIVE SLEEP APNEA): ICD-10-CM

## 2024-09-26 DIAGNOSIS — R06.2 WHEEZING: ICD-10-CM

## 2024-09-26 DIAGNOSIS — R09.81 NASAL CONGESTION: Primary | ICD-10-CM

## 2024-09-26 DIAGNOSIS — I10 BENIGN ESSENTIAL HYPERTENSION: ICD-10-CM

## 2024-09-26 DIAGNOSIS — R06.02 SHORTNESS OF BREATH: ICD-10-CM

## 2024-09-26 PROBLEM — R29.818 SUSPECTED SLEEP APNEA: Status: RESOLVED | Noted: 2024-06-14 | Resolved: 2024-09-26

## 2024-09-26 PROCEDURE — 99215 OFFICE O/P EST HI 40 MIN: CPT | Performed by: NURSE PRACTITIONER

## 2024-09-26 PROCEDURE — 4010F ACE/ARB THERAPY RXD/TAKEN: CPT | Performed by: NURSE PRACTITIONER

## 2024-09-26 PROCEDURE — 3008F BODY MASS INDEX DOCD: CPT | Performed by: NURSE PRACTITIONER

## 2024-09-26 PROCEDURE — 1036F TOBACCO NON-USER: CPT | Performed by: NURSE PRACTITIONER

## 2024-09-26 PROCEDURE — 1160F RVW MEDS BY RX/DR IN RCRD: CPT | Performed by: NURSE PRACTITIONER

## 2024-09-26 PROCEDURE — 3048F LDL-C <100 MG/DL: CPT | Performed by: NURSE PRACTITIONER

## 2024-09-26 PROCEDURE — 3061F NEG MICROALBUMINURIA REV: CPT | Performed by: NURSE PRACTITIONER

## 2024-09-26 PROCEDURE — 1159F MED LIST DOCD IN RCRD: CPT | Performed by: NURSE PRACTITIONER

## 2024-09-26 PROCEDURE — 3077F SYST BP >= 140 MM HG: CPT | Performed by: NURSE PRACTITIONER

## 2024-09-26 PROCEDURE — 3051F HG A1C>EQUAL 7.0%<8.0%: CPT | Performed by: NURSE PRACTITIONER

## 2024-09-26 PROCEDURE — 3078F DIAST BP <80 MM HG: CPT | Performed by: NURSE PRACTITIONER

## 2024-09-26 RX ORDER — FLUTICASONE PROPIONATE 50 MCG
1 SPRAY, SUSPENSION (ML) NASAL DAILY
Qty: 16 G | Refills: 12 | Status: SHIPPED | OUTPATIENT
Start: 2024-09-26 | End: 2025-09-26

## 2024-09-26 ASSESSMENT — SLEEP AND FATIGUE QUESTIONNAIRES
WAKING_TOO_EARLY: MILD
SLEEP_PROBLEM_NOTICEABLE_TO_OTHERS: SOMEWHAT
HOW LIKELY ARE YOU TO NOD OFF OR FALL ASLEEP WHILE SITTING AND READING: SLIGHT CHANCE OF DOZING
HOW LIKELY ARE YOU TO NOD OFF OR FALL ASLEEP WHEN YOU ARE A PASSENGER IN A CAR FOR AN HOUR WITHOUT A BREAK: WOULD NEVER DOZE
SATISFACTION_WITH_CURRENT_SLEEP_PATTERN: SATISFIED
SLEEP_PROBLEM_INTERFERES_DAILY_ACTIVITIES: SOMEWHAT
HOW LIKELY ARE YOU TO NOD OFF OR FALL ASLEEP WHILE WATCHING TV: MODERATE CHANCE OF DOZING
ESS-CHAD TOTAL SCORE: 5
HOW LIKELY ARE YOU TO NOD OFF OR FALL ASLEEP WHILE LYING DOWN TO REST IN THE AFTERNOON WHEN CIRCUMSTANCES PERMIT: SLIGHT CHANCE OF DOZING
DIFFICULTY_STAYING_ASLEEP: MILD
WORRIED_DISTRESSED_DUE_TO_SLEEP: SOMEWHAT
HOW LIKELY ARE YOU TO NOD OFF OR FALL ASLEEP WHILE SITTING AND TALKING TO SOMEONE: WOULD NEVER DOZE
SITING INACTIVE IN A PUBLIC PLACE LIKE A CLASS ROOM OR A MOVIE THEATER: WOULD NEVER DOZE
HOW LIKELY ARE YOU TO NOD OFF OR FALL ASLEEP WHILE SITTING QUIETLY AFTER LUNCH WITHOUT ALCOHOL: WOULD NEVER DOZE
HOW LIKELY ARE YOU TO NOD OFF OR FALL ASLEEP IN A CAR, WHILE STOPPED FOR A FEW MINUTES IN TRAFFIC: SLIGHT CHANCE OF DOZING

## 2024-09-26 ASSESSMENT — COLUMBIA-SUICIDE SEVERITY RATING SCALE - C-SSRS
2. HAVE YOU ACTUALLY HAD ANY THOUGHTS OF KILLING YOURSELF?: NO
6. HAVE YOU EVER DONE ANYTHING, STARTED TO DO ANYTHING, OR PREPARED TO DO ANYTHING TO END YOUR LIFE?: NO
1. IN THE PAST MONTH, HAVE YOU WISHED YOU WERE DEAD OR WISHED YOU COULD GO TO SLEEP AND NOT WAKE UP?: NO

## 2024-09-26 ASSESSMENT — PATIENT HEALTH QUESTIONNAIRE - PHQ9
SUM OF ALL RESPONSES TO PHQ9 QUESTIONS 1 AND 2: 0
1. LITTLE INTEREST OR PLEASURE IN DOING THINGS: NOT AT ALL
2. FEELING DOWN, DEPRESSED OR HOPELESS: NOT AT ALL

## 2024-09-26 ASSESSMENT — ENCOUNTER SYMPTOMS
OCCASIONAL FEELINGS OF UNSTEADINESS: 0
DEPRESSION: 0
LOSS OF SENSATION IN FEET: 0

## 2024-09-26 NOTE — PATIENT INSTRUCTIONS
"  Lung function testing-- they should call you to schedule for Rashida    Adjusted pressure setting 7-15 EPR of [address] [place] [address] Sleep Medicine  [facility] [address] [facility]  [address] [facility] [address]       NAME: Eligio Hogue   DATE:  09/26/24    DIAGNOSIS:   1. Nasal congestion  fluticasone (Flonase) 50 mcg/actuation nasal spray          Thank you for coming to the Sleep Medicine Clinic today! Your sleep medicine provider today was: ADALBERTO Weiss Below is a summary of your treatment plan, other important information, and our contact numbers:    TREATMENT PLAN:   - Follow-up in 6 months.  - If not already done, sign up for 'My Chart' and send prescription requests or messages through this    Starting Positive Airway Pressure: You were ordered a device to wear when you sleep called PAP (Positive Airway Pressure) to treat your sleep apnea. The order will be submitted to a durable medical equipment company who will arrange setting you up with the device. They will provide all the necessary equipment and discuss use and maintenance of the device with you.     Please followup with us in 1-2 months of starting PAP to see how well it is working for you or to troubleshoot. Please bring your equipment to this initial followup visit.    **Please bring all PAP equipment with you to follow up appointments unless told otherwise.**     Important things to keep in mind as you start PAP:  Insurance will monitor your usage during the first 90 days.  You should use your PAP - \"all night, every night\", for your health. The bare minimum is to use your PAP device while sleeping = at least 4 hours per day at least 5 days per week. Otherwise, your PAP device may be reclaimed by your PAP vendor at 90 days.  There are many mask to choose from to wear with your PAP machine. If you are not comfortable with the first mask issued to you, call your DME and ask for another " option to try. Some have a 30 day return policy.  Discuss with your provider if you are having issues breathing with the machine or the temperature or humidity feel uncomfortable  Expect to have an adjustment period when you start your device. It helps to continuing wearing the machine every day for a period of time until you get more used to it. You can practice with wearing the mask alone if you need, then add in the PAP air pressure a few days later.   Reach out for help if you are struggling! The sleep medicine department can be reached at [phone]  We encourage you to download data monitoring apps to your phone. For [employer]Med AirSense 10/11 - MyAir livier. For [employer] DreamStation - DreamMapper. Both are available in the Livier store for free and are a great tool to monitor your progress with your CPAP night to night.           Annual Reminders About Your Sleep Apnea    Your sleep apnea is well controlled based on reviewing your PAP Data Report.     General Reminders:  Continue current machine settings. Continue using machine every night. Need at least 4 hours daily usage.   Remember to clean your mask, tubings, and water chamber regularly as instructed.  Know the replacement schedule of your supplies/ accessories and contact your DME (durable medical equipment) provider if you are due for them.  Avoid driving or operating heavy machinery when drowsy. A person driving while sleepy is 5 times more likely to have an accident. If you feel sleepy, pull over and take a short power nap (sleep for less than 30 minutes). Otherwise, ask somebody to drive you.    Follow-up sooner through MyChart or calling our office if you have any of the following symptoms:  Snoring or stopping breathing while using the machine  Recurrence of fatigue, sleepiness, insomnia, and other symptoms you had prior using machine  Persistent or worsening fatigue or sleepiness despite regular use of machine  Issues tolerating the machine like bloating  sensation, air hunger, too much hot air, too much pressure, taking off mask without recall in the middle of sleep, etc.     Other conservative measures to improve sleep apnea:  Losing weight can lead to some improvement of FLOYD which means you will need lower pressures in machine to control your FLOYD. In some patients, they don't need to use the machine after bariatric surgery. Hence, consider medical and/or surgical weight loss especially if your BMI is more than 35.  Avoiding alcohol, sedative-hypnotics, and sedating medications is imperative as these substances can worsen snoring and sleep apnea  If you have nasal congestion or seasonal allergies, improving your breathing through the nose is critical for treating sleep apnea, tolerating CPAP, and improving your sleep; hence, using intranasal steroid spray like Flonase might help. Make sure you know the proper way to use it.  Stay off your back when sleeping.    Common issues with PAP machine:  Mask gets dislodged when turning to the side: Consider getting a CPAP pillow or switching to a mask with hose on top.     Dry mouth:  Your machine has built-in humidifier that heats up the air to prevent dry mouth. It can be adjusted to your comfort. You can try that first and increase setting one level one night at a time to check which setting is comfortable and effective in lessening dry mouth. If dry mouth persists despite humidity setting adjustment, may apply OTC Biotene gel over the gums at bedtime.  If Biotene gel is not effective, consider trying XEROSTOM gel from [employer].  Also, eliminate or reduce dose of meds that can cause dry mouth if possible. Lastly, may try getting a separate room humidifier machine.    Airleaks: Please call DME as they may need to adjust your mask or refit you with a different kind of mask. In addition, you can ask DME for tips on getting a good mask seal and mask fit.     Difficulty tolerating the mask: Contact your DME to try a  "different kind of mask and/or call office to get a referral to Sleep Psychologist for CPAP desensitization. CPAP desensitization technique is a set of strategies that helps patient cope with claustrophobia and anxiety related to wearing mask. Alternatively, we can do a daytime mini-sleep study called PAP-nap trial wherein you will try on different kinds of mask and the sleep technician will try different pressure settings on CPAP and BPAP machines to see which specific pressure is tolerable and comfortable for you.     Water droplets or moisture within the hose and/or mask: This is called rain-out and it is caused by condensation of too much heated humidity on the cooler walls of the hose. If you have rain-out, turn down humidity settings or get a heated hose. If you already have a heated hose, turn up the \"tube temperature\" of the heated hose. Alternatively, if you don't want to get a heated hose or warmer air, may wrap the CPAP hose with stockings to keep it somewhat warm. Also, you need to place the machine on the floor and lower the hose so that water won't travel upward towards your mask.     You can also go to the following EDUCATION WEBSITES for further information:   American Academy of Sleep Medicine http://sleepeducation.org  National Sleep Foundation: https://sleepfoundation.org  American Sleep Apnea Association: https://www.sleepapnea.org (for patients with sleep apnea)    Here at [facility], we wish you a restful sleep!     Instructions - Common FLOYD Recs: - For your sleep apnea, continue to use your PAP every night and use it whenever you are sleeping.   - Avoid alcohol or sedatives several hours prior to sleeping.   - Get additional supplies for your PAP (e.g., mask, hose, filters) every 3 months or as your insurance allows from your "[employer]" company. Replacement cushions for your PAP mask can be requested monthly if airseals are an issue.  - Remember to clean your mask, tubings, and water chamber " regularly as instructed.  - Avoid driving or operating heavy machinery when drowsy. A person driving while sleepy is five (5) times more likely to have an accident. If you feel sleepy, pull over and take a short power nap (sleep for less than 30 minutes). Otherwise, ask somebody to drive you.    EASY WAYS TO IMPROVE YOUR SLEEP:  1. Go to bed and wake up at the same time every day.   Aim for 8 hours but some people need less, some need more.   Get out of bed if you are not sleeping.   Limit naps to 20 min or less.   2. Expose yourself to daylight and/ or bright light in the morning.   Go outside or spend time near a window each morning.   You can use a light box (found on Amazon) if you wake before the sunrise.   Limit light exposure in the evenings (including electronic usage).   Try meditation, reading, stretching, deep breathing, warm shower or bath, or yoga nidra as part of your bedtime routine. There are many great FREE, videos or audio tracks on AppMyDay/ Everlaw, etc for guidance.  3. Exercise, in some form, EVERY day, but not too close to bedtime. Consider making this part of your routine at the start of your day, followed by a cool shower.  4. Eat meals at roughly the same time every day. Make sure you are prioritizing fruits, vegetables, whole grains, lean proteins.  5. Time your caffeine intake. Make sure you are not drinking caffeine within 8 to 12 hours prior to your bedtime.   6. Avoid marijuana, alcohol, and nicotine. They will reduce sleep quality in any quantity.  7. Learn to manage anxiety. Psychology services at  can be reached at [phone] to schedule an appointment.     IMPORTANT INFORMATION:     Call 911 for medical emergencies.  Our offices are generally open from Monday-Friday, 9 am - 5 pm.  If you need to get in touch with me, you may either call me and my team(number is below) or you can use Practice Management e-Tools.  If a referral for a test, for CPAP, or for another specialist was made, and you have  not heard about scheduling this within a week, please call scheduling at [phone] ([phone]).  If you are unable to make your appointment for clinic or an overnight study, kindly call the office at least 48 hours in advance to cancel and reschedule.  If you are on CPAP, please bring your device's card to each clinic appointment unless told otherwise by your provider.  There are no supporting services by either the sleep doctors or their staff on weekends and Holidays, or after 5 PM on weekdays.   If you have been asked to come to a sleep study, make sure you bring toiletries, a comfy pillow, and any nighttime medications that you may regularly take. Also be sure to eat dinner before you arrive. We generally do not provide meals.      PRESCRIPTIONS:  We require 7 days advanced notice for prescription refills. If we do not receive the request in this time, we cannot guarantee that your medication will be refilled in time. Please contact the sleep nurses listed below for refills or request via Meilimeit.     IMPORTANT PHONE NUMBERS:   Sleep Medicine Clinic Fax: [phone]  Appointments (for Pediatric Sleep Clinic): [phone] - option 1  Appointments (for Adult Sleep Clinic): [phone] - option 2  Appointments (For Sleep Studies): [phone] - option 3  Behavioral Sleep Medicine: [phone]  Sleep Surgery: [phone]  ENT (Otolaryngology): [phone]  Headache Clinic (Neurology): [phone]  Neurology: [phone]  Psychiatry: [phone]  Pulmonary Function Testing (PFT) Center: [phone]  Pulmonary Medicine: [phone]  [facility] (DME): [phone]  [place] (DME): [phone]  [facility] ([facility] [phone]    Our Adult Sleep Medicine Team (Please do not hesitate to call the office or sleep nurse with any questions between appointments):    Adult Sleep Nurses (Caitlin Duarte RN and Yamilka Shahid RN):  For clinical questions and  refilling prescriptions: [phone]  Email sleep diaries and other documents at: [email or web address]    Adult Sleep Medicine Secretaries:  Farnaz Humphries (For Inocencio/Schmitt/Krise/Strohl/Yeh): P: [phone]  F: [phone]  Modesto August ([place])Office: [phone] option 4 Office Fax: [phone]  Geraldine Vargas (For Garcia): P: [phone]  Fax: [phone]  Shruti Davisonallyssa (For Jurcevic/Blank): P: [phone]  F: [phone]  Linnea Hahn (For Sydni): P: [phone]  F: [phone]  Irma Marinelli (For Ivy/Kenna/Zakhary): P: [phone]  F: [phone]  Staci Ayala (For Stuart/Graham): P: [phone]  F: [phone]     Adult Sleep Medicine Advanced Practice Providers:  Jeff Bridges ([place]  Lidya Pierson ([facility])  Autumn Rodriguez CNP ([place], [place], [place])  Magdalena Nieto CNP ([place], [place], [facility])  Hannah Troy ([facility] [place], [facility])  Esteban Graham CNP ([facility])      Our Sleep Testing Center (STC) Locations:  Our team will contact you to schedule your sleep study, however, you can contact us as follow:  Main Phone Line (scheduling only): [phone] ([phone]), option 3  Adult and Pediatric Locations  [facility] (6 years and older): [facility] - 4th floor ([address] [place] [address]) After hours line: [phone]  [facility] (Main campus: All ages): [facility], 6th floor. After hours line: [phone]  [facility] (5 years and older; younger considered on case-by-case basis): [address]; [address], [address]. Scheduling  After hours line: [phone]   Michelle (6 years and older): [address]; [address]; [address]   Nuha (6 years and older): [address] [facility], [address]  After hours line: [phone]   Deon (13 years and older) in [place]: [address], 2nd floor  After hours line:  [phone]   Emily (13 year and older): [address]  After hours line: [phone] (Home studies out of [facility])    Adult Only Locations:   Katie (18 years and older): [address] [facility], 2nd floor   Angelina (18 years and older): [address] [facility]; 4th floor  After hours line: [phone]  [facility] (18 years and older) at [place]: [address] [facility]  After hours line: [phone]        CONTACTING YOUR SLEEP MEDICINE PROVIDER:  Send a message directly to your provider through \"My Chart\", which is the email service through your  Records Account: [email or web address]   Call [phone] and leave a message. One of the administrative assistants will forward the message to your sleep medicine provider through \"My Chart\" and/or email.     Your sleep medicine provider for this visit was: MELBA Weiss-CNP    In the event that you are running more than 15 minutes late to your appointment, I will kindly ask you to reschedule.       "

## 2024-09-26 NOTE — ASSESSMENT & PLAN NOTE
Moderate per HSAT completed in July 2024 with REI3% was 22.7 events per hour. The SpO2 martir was 74%.   -Well controlled on current settings  -Adjusted to 7-15 EPR of 3 for comfort.   -Recommended trial of fluticasone PRN nasal congestion. He was agreeable. Order to [facility] today.  -Supply order updated today  -Discussed mask alternatives. He would prefer to continue with style he has. Recommended considering small cushion if leak persists. May benefit from chin strap or mouth tape. Or change to P30i

## 2024-09-26 NOTE — ASSESSMENT & PLAN NOTE
Endorses wheezing at times at night + postnasal drip  Resting awake pulse ox ~ 94%  Will order PFTs - plan for pulm referral pending results

## 2024-10-02 ENCOUNTER — APPOINTMENT (OUTPATIENT)
Dept: UROLOGY | Facility: CLINIC | Age: 64
End: 2024-10-02
Payer: COMMERCIAL

## 2024-10-02 DIAGNOSIS — R93.5 ABNORMAL MRI, PELVIS: ICD-10-CM

## 2024-10-02 DIAGNOSIS — Z01.818 PREOP TESTING: ICD-10-CM

## 2024-10-02 DIAGNOSIS — R33.9 URINARY RETENTION: Primary | ICD-10-CM

## 2024-10-02 DIAGNOSIS — Z79.01 CHRONIC ANTICOAGULATION: ICD-10-CM

## 2024-10-02 DIAGNOSIS — R33.9 URINARY RETENTION: ICD-10-CM

## 2024-10-02 PROCEDURE — 99214 OFFICE O/P EST MOD 30 MIN: CPT | Performed by: UROLOGY

## 2024-10-02 PROCEDURE — 3051F HG A1C>EQUAL 7.0%<8.0%: CPT | Performed by: UROLOGY

## 2024-10-02 PROCEDURE — G2211 COMPLEX E/M VISIT ADD ON: HCPCS | Performed by: UROLOGY

## 2024-10-02 PROCEDURE — 3061F NEG MICROALBUMINURIA REV: CPT | Performed by: UROLOGY

## 2024-10-02 PROCEDURE — 3048F LDL-C <100 MG/DL: CPT | Performed by: UROLOGY

## 2024-10-02 PROCEDURE — 4010F ACE/ARB THERAPY RXD/TAKEN: CPT | Performed by: UROLOGY

## 2024-10-02 RX ORDER — SODIUM CHLORIDE, SODIUM LACTATE, POTASSIUM CHLORIDE, CALCIUM CHLORIDE 600; 310; 30; 20 MG/100ML; MG/100ML; MG/100ML; MG/100ML
20 INJECTION, SOLUTION INTRAVENOUS CONTINUOUS
OUTPATIENT
Start: 2024-10-02

## 2024-10-02 RX ORDER — CIPROFLOXACIN 2 MG/ML
400 INJECTION, SOLUTION INTRAVENOUS ONCE
OUTPATIENT
Start: 2024-10-02 | End: 2024-10-02

## 2024-10-02 NOTE — PROGRESS NOTES
Subjective     This visit was completed via telemedicine. All issues as below were discussed and addressed but no physical exam was performed unless allowed by visual confirmation. If it was felt that the patient should be evaluated in clinic, then they were directed there. Patient verbally consented to visit.      Eligio Hogue is a 64 y.o. male with urinary retention, incomplete bladder emptying, gross hematuria and UTI's. He presents today for a follow up visit to review prostate MRI.  Patient underwent cardiac radiofrequency ablation on 9/12/2024 with Dr. Bajwa. He is currently on Eliquis.         Past Medical History:   Diagnosis Date    A-fib (Multi)     Atrial flutter (Multi) 09/26/2023    Benign essential hypertension 06/10/2024    DM2 (diabetes mellitus, type 2) (Multi)     GERD (gastroesophageal reflux disease) 06/10/2024    Hyperlipidemia 06/10/2024    Hypothyroidism 06/10/2024    Other chest pain 02/16/2021    Chest tightness or pressure    Personal history of other benign neoplasm     History of benign neoplasm of pharynx    Suspected sleep apnea 06/14/2024     Past Surgical History:   Procedure Laterality Date    CARDIAC ELECTROPHYSIOLOGY PROCEDURE N/A 9/12/2024    Procedure: Ablation A-Fib;  Surgeon: Nickolas Bajwa MD;  Location: [facility] Cardiac [facility] Lab;  Service: Electrophysiology;  Laterality: N/A;    OTHER SURGICAL HISTORY  03/10/2016    Excision Of Tracheal Tumor    OTHER SURGICAL HISTORY  08/09/2018    Oral Surgery Tooth Extraction Nelson Tooth     Family History   Problem Relation Name Age of Onset    Heart disease Mother      Diabetes Brother       Current Outpatient Medications   Medication Sig Dispense Refill    apixaban (Eliquis) 5 mg tablet Take 1 tablet (5 mg) by mouth 2 times a day. 180 tablet 3    flecainide (Tambocor) 150 mg tablet Take 1 tablet (150 mg) by mouth 2 times a day. 180 tablet 0    fluticasone (Flonase) 50 mcg/actuation nasal spray Administer 1 spray into each nostril once  daily. Shake gently. Before first use, prime pump. After use, clean tip and replace cap. 16 g 12    losartan (Cozaar) 50 mg tablet Take 1 tablet (50 mg) by mouth once daily. 90 tablet 1    metFORMIN  mg 24 hr tablet Take 1 tablet (500 mg) by mouth 2 times daily (morning and late afternoon). 180 tablet 1    metoprolol tartrate (Lopressor) 25 mg tablet Take 1 tablet (25 mg) by mouth 2 times a day. 180 tablet 1    rosuvastatin (Crestor) 20 mg tablet Take 1 tablet (20 mg) by mouth once daily. 90 tablet 1    tamsulosin (Flomax) 0.4 mg 24 hr capsule Take 1 capsule (0.4 mg) by mouth once daily. 30 capsule 11     No current facility-administered medications for this visit.     Allergies   Allergen Reactions    Penicillins Other     Blisters in mouth      Omeprazole Rash     Social History     Socioeconomic History    Marital status:      Spouse name: Not on file    Number of children: Not on file    Years of education: Not on file    Highest education level: Not on file   Occupational History    Not on file   Tobacco Use    Smoking status: Former     Types: Cigarettes    Smokeless tobacco: Never   Vaping Use    Vaping status: Never Used   Substance and Sexual Activity    Alcohol use: Yes     Comment: rarely    Drug use: Never    Sexual activity: Defer   Other Topics Concern    Not on file   Social History Narrative    Not on file     Social Determinants of Health     Financial Resource Strain: Not on file   Food Insecurity: Not on file   Transportation Needs: Not on file   Physical Activity: Not on file   Stress: Not on file   Social Connections: Not on file   Intimate Partner Violence: Not on file   Housing Stability: Not on file       Review of Systems  Pertinent items are noted in HPI.    Objective       Lab Review  Lab Results   Component Value Date    WBC 9.4 08/30/2024    RBC 4.88 08/30/2024    HGB 13.9 08/30/2024    HCT 43.1 08/30/2024     08/30/2024      Lab Results   Component Value Date    BUN  18 08/30/2024    CREATININE 1.26 08/30/2024      Lab Results   Component Value Date    PSA 2.19 06/17/2024           Assessment/Plan   There are no diagnoses linked to this encounter.  Gross hematuria   Incomplete bladder emptying (2L)  UTI    I reviewed prostate MRI from 9/20/2024 which showed a 44.8g prostate with a PI-RADS 4 lesion in the left mid posteriorperipheral zone. There is broad-based abutment with the adjacent prostatic capsule with focal irregularity along the capsular margin suspicious for extracapsular extension. No evidence of enlarged pelvic lymph nodes.    I recommend proceeding with fusion guided prostate biopsy.    I reviewed with him the risks of fusion guided prostate biopsy which include hematuria, rectal bleeding, UTI, urosepsis, urinary retention, discomfort, and missing prostate cancer diagnosis. Antibiotic will be administered prior to the procedure to minimize those risks. This will be scheduled with Dr. Rubio under sedation.       All questions were answered to the patient's satisfaction. Patient agrees with the plan and wishes to proceed. Follow-up will be scheduled appropriately.     E&M visit today is associated with current or anticipated ongoing medical care services related to a patient's single, serious condition or a complex condition.    Scribed for Dr. Hendricks by Megha Sahni. I , Dr Hendricks, have personally reviewed and agreed with the information entered by the Virtual Scribe.

## 2024-10-22 ENCOUNTER — OFFICE VISIT (OUTPATIENT)
Dept: CARDIOLOGY | Facility: CLINIC | Age: 64
End: 2024-10-22
Payer: COMMERCIAL

## 2024-10-22 VITALS
OXYGEN SATURATION: 96 % | SYSTOLIC BLOOD PRESSURE: 154 MMHG | HEIGHT: 75 IN | DIASTOLIC BLOOD PRESSURE: 85 MMHG | HEART RATE: 62 BPM | WEIGHT: 315 LBS | BODY MASS INDEX: 39.17 KG/M2

## 2024-10-22 DIAGNOSIS — I48.0 PAROXYSMAL ATRIAL FIBRILLATION (MULTI): ICD-10-CM

## 2024-10-22 PROCEDURE — 3048F LDL-C <100 MG/DL: CPT | Performed by: INTERNAL MEDICINE

## 2024-10-22 PROCEDURE — 4010F ACE/ARB THERAPY RXD/TAKEN: CPT | Performed by: INTERNAL MEDICINE

## 2024-10-22 PROCEDURE — 3077F SYST BP >= 140 MM HG: CPT | Performed by: INTERNAL MEDICINE

## 2024-10-22 PROCEDURE — 1159F MED LIST DOCD IN RCRD: CPT | Performed by: INTERNAL MEDICINE

## 2024-10-22 PROCEDURE — 1036F TOBACCO NON-USER: CPT | Performed by: INTERNAL MEDICINE

## 2024-10-22 PROCEDURE — 3051F HG A1C>EQUAL 7.0%<8.0%: CPT | Performed by: INTERNAL MEDICINE

## 2024-10-22 PROCEDURE — 3061F NEG MICROALBUMINURIA REV: CPT | Performed by: INTERNAL MEDICINE

## 2024-10-22 PROCEDURE — 3008F BODY MASS INDEX DOCD: CPT | Performed by: INTERNAL MEDICINE

## 2024-10-22 PROCEDURE — 93010 ELECTROCARDIOGRAM REPORT: CPT | Performed by: INTERNAL MEDICINE

## 2024-10-22 PROCEDURE — 3079F DIAST BP 80-89 MM HG: CPT | Performed by: INTERNAL MEDICINE

## 2024-10-22 PROCEDURE — 99214 OFFICE O/P EST MOD 30 MIN: CPT | Performed by: INTERNAL MEDICINE

## 2024-10-22 PROCEDURE — 1126F AMNT PAIN NOTED NONE PRSNT: CPT | Performed by: INTERNAL MEDICINE

## 2024-10-22 PROCEDURE — 93005 ELECTROCARDIOGRAM TRACING: CPT | Performed by: INTERNAL MEDICINE

## 2024-10-22 PROCEDURE — 99214 OFFICE O/P EST MOD 30 MIN: CPT | Mod: 25 | Performed by: INTERNAL MEDICINE

## 2024-10-22 ASSESSMENT — ENCOUNTER SYMPTOMS
DEPRESSION: 0
OCCASIONAL FEELINGS OF UNSTEADINESS: 0
LOSS OF SENSATION IN FEET: 0

## 2024-10-22 ASSESSMENT — PAIN SCALES - GENERAL: PAINLEVEL_OUTOF10: 0-NO PAIN

## 2024-10-22 ASSESSMENT — COLUMBIA-SUICIDE SEVERITY RATING SCALE - C-SSRS
2. HAVE YOU ACTUALLY HAD ANY THOUGHTS OF KILLING YOURSELF?: NO
1. IN THE PAST MONTH, HAVE YOU WISHED YOU WERE DEAD OR WISHED YOU COULD GO TO SLEEP AND NOT WAKE UP?: NO
6. HAVE YOU EVER DONE ANYTHING, STARTED TO DO ANYTHING, OR PREPARED TO DO ANYTHING TO END YOUR LIFE?: NO

## 2024-10-22 NOTE — PROGRESS NOTES
Referred by Nickolas Mcmanus MD provider found for   Chief Complaint   Patient presents with    Atrial Fibrillation        Eligio Hogue is a 64 y.o. year old male patient with h/o A Fib s/p RFA 6 weeks ago. Presents for follow up.     PMHx/PSHx: As above    FamHx: unremarkable     Allergies:  Allergies   Allergen Reactions    Penicillins Other     Blisters in mouth      Omeprazole Rash        Review of Systems    Constitutional: not feeling tired.   Eyes: no eyesight problems.   ENT: no hearing loss and no nosebleeds.   Cardiovascular: no intermittent leg claudication and as noted in HPI.   Respiratory: no chronic cough and no shortness of breath.   Gastrointestinal: no change in bowel habits and no blood in stools.   Genitourinary: no urinary frequency and no hematuria.   Skin: no skin rashes.   Neurological: no seizures and no frequent falls.   Psychiatric: no depression and not suicidal.   All other systems have been reviewed and are negative for complaint.     Outpatient Medications:  Current Outpatient Medications   Medication Instructions    apixaban (ELIQUIS) 5 mg, oral, 2 times daily    flecainide (TAMBOCOR) 150 mg, oral, 2 times daily    fluticasone (Flonase) 50 mcg/actuation nasal spray 1 spray, Each Nostril, Daily, Shake gently. Before first use, prime pump. After use, clean tip and replace cap.    losartan (COZAAR) 50 mg, oral, Daily    metFORMIN XR (GLUCOPHAGE-XR) 500 mg, oral, 2 times daily (morning and late afternoon)    metoprolol tartrate (LOPRESSOR) 25 mg, oral, 2 times daily    rosuvastatin (CRESTOR) 20 mg, oral, Daily    tamsulosin (FLOMAX) 0.4 mg, oral, Daily         Last Recorded Vitals:      9/12/2024     5:00 PM 9/12/2024     5:15 PM 9/12/2024     5:16 PM 9/16/2024     2:43 PM 9/20/2024    10:57 AM 9/26/2024     4:04 PM 10/22/2024     1:40 PM   Vitals   Systolic 112 119 119 142  143 154   Diastolic 68 78 78 76  79 85   Heart Rate 80 79 77   66 62   Temp 36.3 °C (97.3 °F) 36.5 °C (97.7 °F)  "36.5 °C (97.7 °F)       Resp 16 16 16       Height (in)      1.905 m (6' 3\") 1.905 m (6' 3\")   Weight (lb)     359.35 364.5 362.4   BMI     44.92 kg/m2 45.56 kg/m2 45.3 kg/m2   BSA (m2)     2.94 m2 2.95 m2 2.95 m2   Visit Report    Report  Report Report    Visit Vitals  /85 (BP Location: Left arm, Patient Position: Sitting, BP Cuff Size: Large adult)   Pulse 62   Ht 1.905 m (6' 3\")   Wt (!) 164 kg (362 lb 6.4 oz)   SpO2 96%   BMI 45.30 kg/m²   Smoking Status Former   BSA 2.95 m²        Physical Exam:  Constitutional: alert and in no acute distress.   Eyes: no erythema, swelling or discharge from the eye .   Neck: neck is supple, symmetric, trachea midline, no masses  and no thyromegaly .   Pulmonary: no increased work of breathing or signs of respiratory distress  and lungs clear to auscultation.    Cardiovascular: carotid pulses 2+ bilaterally with no bruit , JVP was normal, no thrills , regular rhythm, normal S1 and S2, no murmurs , pedal pulses 2+ bilaterally  and no edema .   Abdomen: abdomen non-tender, no masses  and no hepatomegaly .   Skin: skin warm and dry, normal skin turgor .   Psychiatric judgment and insight is normal  and oriented to person, place and time .        Assessment/Plan   Problem List Items Addressed This Visit             ICD-10-CM    Paroxysmal atrial fibrillation (Multi) I48.0    Relevant Orders    ECG 12 Lead       Eligio Hogue is a 64 y.o. year old male patient with h/o A Fib s/p RFA 6 weeks ago. Presents for follow up.   The patient reports doing well and denies symptoms. His current ECG shows NSR with narrow QRS and HR 60 bpm. Will continue his current medications and follow up in 6 weeks (3 months from ablation).       Nickolas Bajwa MD  Cardiac Electrophysiology      Thank you very much for allowing me to participate in the care of this pleasant patient. Please do not hesitate to contact me with any further questions or concerns regarding his care.    **Disclaimer: This note " was dictated by speech recognition, and every effort has been made to prevent any error in transcription, however minor errors may be present**

## 2024-10-24 LAB
ATRIAL RATE: 60 BPM
P AXIS: 1 DEGREES
P OFFSET: 156 MS
P ONSET: 114 MS
PR INTERVAL: 206 MS
Q ONSET: 217 MS
QRS COUNT: 10 BEATS
QRS DURATION: 106 MS
QT INTERVAL: 440 MS
QTC CALCULATION(BAZETT): 440 MS
QTC FREDERICIA: 440 MS
R AXIS: -28 DEGREES
T AXIS: 24 DEGREES
T OFFSET: 437 MS
VENTRICULAR RATE: 60 BPM

## 2024-10-25 ENCOUNTER — PRE-ADMISSION TESTING (OUTPATIENT)
Dept: PREADMISSION TESTING | Facility: HOSPITAL | Age: 64
End: 2024-10-25
Payer: COMMERCIAL

## 2024-10-25 ENCOUNTER — LAB (OUTPATIENT)
Dept: LAB | Facility: LAB | Age: 64
End: 2024-10-25
Payer: COMMERCIAL

## 2024-10-25 VITALS
OXYGEN SATURATION: 96 % | TEMPERATURE: 96.8 F | WEIGHT: 315 LBS | SYSTOLIC BLOOD PRESSURE: 140 MMHG | RESPIRATION RATE: 18 BRPM | HEIGHT: 75 IN | DIASTOLIC BLOOD PRESSURE: 75 MMHG | BODY MASS INDEX: 39.17 KG/M2 | HEART RATE: 60 BPM

## 2024-10-25 DIAGNOSIS — Z01.818 PREOP TESTING: ICD-10-CM

## 2024-10-25 DIAGNOSIS — R33.9 URINARY RETENTION: ICD-10-CM

## 2024-10-25 DIAGNOSIS — Z79.01 CHRONIC ANTICOAGULATION: ICD-10-CM

## 2024-10-25 LAB
ANION GAP SERPL CALC-SCNC: 11 MMOL/L (ref 10–20)
BNP SERPL-MCNC: 112 PG/ML (ref 0–99)
BUN SERPL-MCNC: 20 MG/DL (ref 6–23)
CALCIUM SERPL-MCNC: 8.6 MG/DL (ref 8.6–10.3)
CHLORIDE SERPL-SCNC: 100 MMOL/L (ref 98–107)
CO2 SERPL-SCNC: 30 MMOL/L (ref 21–32)
CREAT SERPL-MCNC: 1.17 MG/DL (ref 0.5–1.3)
EGFRCR SERPLBLD CKD-EPI 2021: 69 ML/MIN/1.73M*2
ERYTHROCYTE [DISTWIDTH] IN BLOOD BY AUTOMATED COUNT: 13.7 % (ref 11.5–14.5)
GLUCOSE SERPL-MCNC: 227 MG/DL (ref 74–99)
HCT VFR BLD AUTO: 40.6 % (ref 41–52)
HGB BLD-MCNC: 13.2 G/DL (ref 13.5–17.5)
INR PPP: 1.1 (ref 0.9–1.2)
MCH RBC QN AUTO: 28 PG (ref 26–34)
MCHC RBC AUTO-ENTMCNC: 32.5 G/DL (ref 32–36)
MCV RBC AUTO: 86 FL (ref 80–100)
NRBC BLD-RTO: ABNORMAL /100{WBCS}
PLATELET # BLD AUTO: 252 X10*3/UL (ref 150–450)
POTASSIUM SERPL-SCNC: 4.7 MMOL/L (ref 3.5–5.3)
PROTHROMBIN TIME: 10.5 SECONDS (ref 9.3–12.7)
RBC # BLD AUTO: 4.72 X10*6/UL (ref 4.5–5.9)
SODIUM SERPL-SCNC: 136 MMOL/L (ref 136–145)
WBC # BLD AUTO: 8.4 X10*3/UL (ref 4.4–11.3)

## 2024-10-25 PROCEDURE — 83036 HEMOGLOBIN GLYCOSYLATED A1C: CPT

## 2024-10-25 PROCEDURE — 85027 COMPLETE CBC AUTOMATED: CPT

## 2024-10-25 PROCEDURE — 85610 PROTHROMBIN TIME: CPT

## 2024-10-25 PROCEDURE — 83880 ASSAY OF NATRIURETIC PEPTIDE: CPT

## 2024-10-25 PROCEDURE — 87086 URINE CULTURE/COLONY COUNT: CPT

## 2024-10-25 PROCEDURE — 80048 BASIC METABOLIC PNL TOTAL CA: CPT

## 2024-10-25 PROCEDURE — 36415 COLL VENOUS BLD VENIPUNCTURE: CPT

## 2024-10-25 PROCEDURE — 99204 OFFICE O/P NEW MOD 45 MIN: CPT

## 2024-10-25 ASSESSMENT — DUKE ACTIVITY SCORE INDEX (DASI)
CAN YOU PARTICIPATE IN MODERATE RECREATIONAL ACTIVITIES LIKE GOLF, BOWLING, DANCING, DOUBLES TENNIS OR THROWING A BASEBALL OR FOOTBALL: YES
TOTAL_SCORE: 34.7
DASI METS SCORE: 7
CAN YOU HAVE SEXUAL RELATIONS: YES
CAN YOU DO MODERATE WORK AROUND THE HOUSE LIKE VACUUMING, SWEEPING FLOORS OR CARRYING GROCERIES: YES
CAN YOU PARTICIPATE IN STRENOUS SPORTS LIKE SWIMMING, SINGLES TENNIS, FOOTBALL, BASKETBALL, OR SKIING: NO
CAN YOU WALK INDOORS, SUCH AS AROUND YOUR HOUSE: YES
CAN YOU DO LIGHT WORK AROUND THE HOUSE LIKE DUSTING OR WASHING DISHES: YES
CAN YOU DO HEAVY WORK AROUND THE HOUSE LIKE SCRUBBING FLOORS OR LIFTING AND MOVING HEAVY FURNITURE: NO
CAN YOU TAKE CARE OF YOURSELF (EAT, DRESS, BATHE, OR USE TOILET): YES
CAN YOU DO YARD WORK LIKE RAKING LEAVES, WEEDING OR PUSHING A MOWER: YES
CAN YOU CLIMB A FLIGHT OF STAIRS OR WALK UP A HILL: YES
CAN YOU WALK A BLOCK OR TWO ON LEVEL GROUND: YES
CAN YOU RUN A SHORT DISTANCE: NO

## 2024-10-25 ASSESSMENT — PAIN SCALES - GENERAL: PAINLEVEL_OUTOF10: 0 - NO PAIN

## 2024-10-25 ASSESSMENT — PAIN - FUNCTIONAL ASSESSMENT: PAIN_FUNCTIONAL_ASSESSMENT: 0-10

## 2024-10-25 NOTE — CPM/PAT H&P
CPM/PAT Evaluation       Name: Eligio Hogue (Eligio Hogue)  /Age: 1960/64 y.o.     In-Person       Chief Complaint: Abnormal MRI, pelvis     HPI  Patient is an alert and oriented 64 year old male scheduled for a prostate biopsy on 2024 with Dr. Rubio for abnormal MRI, pelvis. He has no complaints of pain but does endorse urinary frequency, urgency, and nocturia. PMHX includes AF, morbid obesity, HTN, HLD, T2DM, FLOYD, and BPH. Presents to [facility] PAT today for perioperative risk stratification and optimization.     Past Medical History:   Diagnosis Date    A-fib (Multi)     Atrial flutter (Multi) 2023    Benign essential hypertension 06/10/2024    DM2 (diabetes mellitus, type 2) (Multi)     GERD (gastroesophageal reflux disease) 06/10/2024    Hyperlipidemia 06/10/2024    Hypothyroidism 06/10/2024    Other chest pain 2021    Chest tightness or pressure    Personal history of other benign neoplasm     History of benign neoplasm of pharynx    Suspected sleep apnea 2024     Past Surgical History:   Procedure Laterality Date    CARDIAC ELECTROPHYSIOLOGY PROCEDURE N/A 2024    Procedure: Ablation A-Fib;  Surgeon: Nickolas Bajwa MD;  Location: [facility] Cardiac Cath Lab;  Service: Electrophysiology;  Laterality: N/A;    OTHER SURGICAL HISTORY  03/10/2016    Excision Of Tracheal Tumor    OTHER SURGICAL HISTORY  2018    Oral Surgery Tooth Extraction [facility]     Patient Sexual activity questions deferred to the physician.    Family History   Problem Relation Name Age of Onset    Heart disease Mother      Diabetes Brother       Allergies   Allergen Reactions    Penicillins Other     Blisters in mouth      Omeprazole Rash     Prior to Admission medications    Medication Sig Start Date End Date Taking? Authorizing Provider   apixaban (Eliquis) 5 mg tablet Take 1 tablet (5 mg) by mouth 2 times a day. 7/3/24 7/3/25 Yes Zeyad Hernandez MD   flecainide (Tambocor) 150 mg tablet Take 1 tablet (150  mg) by mouth 2 times a day. 9/12/24  Yes MELBA Acuña-CNP, DNP   losartan (Cozaar) 50 mg tablet Take 1 tablet (50 mg) by mouth once daily. 9/16/24  Yes Vernon Estrada MD   metFORMIN  mg 24 hr tablet Take 1 tablet (500 mg) by mouth 2 times daily (morning and late afternoon). 9/16/24  Yes Vernon Estrada MD   metoprolol tartrate (Lopressor) 25 mg tablet Take 1 tablet (25 mg) by mouth 2 times a day. 6/17/24  Yes Vernon Estrada MD   rosuvastatin (Crestor) 20 mg tablet Take 1 tablet (20 mg) by mouth once daily. 9/16/24  Yes Vernon Estrada MD   tamsulosin (Flomax) 0.4 mg 24 hr capsule Take 1 capsule (0.4 mg) by mouth once daily. 8/30/24 8/30/25 Yes Beth Hendricks MD   fluticasone (Flonase) 50 mcg/actuation nasal spray Administer 1 spray into each nostril once daily. Shake gently. Before first use, prime pump. After use, clean tip and replace cap.  Patient not taking: Reported on 10/25/2024 9/26/24 9/26/25  MELBA Weiss-CNP       Review of Systems   Constitutional: Negative for chills, decreased appetite, diaphoresis, fever and malaise/fatigue.   Eyes:  Negative for blurred vision and double vision.   Cardiovascular:  Negative for chest pain, claudication, cyanosis, dyspnea on exertion, irregular heartbeat, leg swelling, near-syncope and palpitations.   Respiratory:  Negative for cough, hemoptysis, shortness of breath and wheezing.    Endocrine: Negative for cold intolerance, heat intolerance, polydipsia, polyphagia and polyuria.   Gastrointestinal:  Negative for abdominal pain, constipation, diarrhea, dysphagia, nausea and vomiting.   Genitourinary:  Negative for bladder incontinence, dysuria, hematuria, incomplete emptying, pelvic pain. Positive for frequency, urgency, and nocturia.   Neurological:  Negative for headaches, paresthesias, sensory change and weakness. Positive for occasional lightheadedness on position change.   Psychiatric/Behavioral:  Negative for altered mental status.  "   Musculoskeletal: Negative for myalgias, arthralgias     Vitals and nursing note reviewed.     Physical exam  Constitutional:       Appearance: Normal appearance. He is Morbidly Obese.   HENT:      Head: Normocephalic and atraumatic.      Mouth/Throat:      Mouth: Mucous membranes are moist.      Pharynx: Oropharynx is clear.   Eyes:      Extraocular Movements: Extraocular movements intact.      Conjunctiva/sclera: Conjunctivae normal.      Pupils: Pupils are equal, round, and reactive to light.   Cardiovascular:      PMI at left midclavicular line. Normal rate. Regular rhythm. Normal S1. Normal S2.       Murmurs: There is no murmur.      No gallop.  No click. No rub.       No audible carotid bruit     2+ lower extremity edema bilaterally  Pulmonary:      Effort: Pulmonary effort is normal.      Breath sounds: Normal breath sounds.   Abdominal:      General: Abdomen is flat. Bowel sounds are normal.      Palpations: Abdomen is soft and non-tender  Musculoskeletal:      Cervical back: Normal range of motion and neck supple.   Skin:     General: Skin is warm and dry.      Capillary Refill: Capillary refill takes less than 2 seconds.   Neurological:      General: No focal deficit present.      Mental Status: He is alert and oriented to person, place, and time. Mental status is at baseline.   Psychiatric:         Mood and Affect: Mood normal.         Behavior: Behavior normal.         Thought Content: Thought content normal.         Judgment: Judgment normal.     Vitals and nursing note reviewed. Physical exam within normal limits.       Visit Vitals  /75   Pulse 60   Temp 36 °C (96.8 °F) (Temporal)   Resp 18   Ht 1.905 m (6' 3\")   Wt (!) 165 kg (363 lb 8.6 oz)   SpO2 96%   BMI 45.44 kg/m²   Smoking Status Former   BSA 2.95 m²      DASI Risk Score      Flowsheet Row Pre-Admission Testing from 10/25/2024 in [facility]   Can you take care of yourself (eat, dress, bathe, or use toilet)?  2.75 filed " at 10/25/2024 1029   Can you walk indoors, such as around your house? 1.75 filed at 10/25/2024 1029   Can you climb a flight of stairs or walk up a hill? 5.5 filed at 10/25/2024 1029   Can you run a short distance? 0 filed at 10/25/2024 1029   Can you do light work around the house like dusting or washing dishes? 2.7 filed at 10/25/2024 1029   Can you do moderate work around the house like vacuuming, sweeping floors or carrying groceries? 3.5 filed at 10/25/2024 1029   Can you do heavy work around the house like scrubbing floors or lifting and moving heavy furniture?  0 filed at 10/25/2024 1029   Can you do yard work like raking leaves, weeding or pushing a mower? 4.5 filed at 10/25/2024 1029   Can you have sexual relations? 5.25 filed at 10/25/2024 1029   Can you participate in moderate recreational activities like golf, bowling, dancing, doubles tennis or throwing a baseball or football? 6 filed at 10/25/2024 1029   Can you participate in strenous sports like swimming, singles tennis, football, basketball, or skiing? 0 filed at 10/25/2024 1029          Caprini DVT Assessment      Flowsheet Row Pre-Admission Testing from 10/25/2024 in [facility]   DVT Score 7 filed at 10/25/2024 1028   Medical Factors Swollen legs filed at 10/25/2024 1028   Surgical Factors Minor surgery planned filed at 10/25/2024 1028   BMI 41-50 (Morbid obesity) filed at 10/25/2024 1028          Modified Frailty Index      Flowsheet Row Pre-Admission Testing from 10/25/2024 in [facility]   Non-independent functional status (problems with dressing, bathing, personal grooming, or cooking) 0 filed at 10/25/2024 1030   History of diabetes mellitus  0.0909 filed at 10/25/2024 1030   History of COPD 0 filed at 10/25/2024 1030   History of CHF No filed at 10/25/2024 1030   History of MI 0 filed at 10/25/2024 1030   History of Percutaneous Coronary Intervention, Cardiac Surgery, or Angina No filed at 10/25/2024 1030    Hypertension requiring the use of medication  0.0909 filed at 10/25/2024 1030   Peripheral vascular disease 0 filed at 10/25/2024 1030   Impaired sensorium (cognitive impairement or loss, clouding, or delirium) 0 filed at 10/25/2024 1030   TIA or CVA withouy residual deficit 0 filed at 10/25/2024 1030   Cerebrovascular accident with deficit 0 filed at 10/25/2024 1030   Modified Frailty Index Calculator .1818 filed at 10/25/2024 1030          CHADS2 Stroke Risk  Current as of 5 hours ago        4% 3 to 100%: High Risk   2 to < 3%: Medium Risk   0 to < 2%: Low Risk     No Change          This score determines the patient's risk of having a stroke if the patient has atrial fibrillation.          Points Metrics   0 Has Congestive Heart Failure:  No     Patients with congestive heart failure get 1 point.    Current as of 5 hours ago   1 Has Hypertension:  Yes     Patients with hypertension get 1 point.    Current as of 5 hours ago   0 Age:  64     Patients who are 75 years of age or older get 1 point.    Current as of 5 hours ago   1 Has Diabetes Excluding Gestational Diabetes:  Yes     Patients with diabetes get 1 point.    Current as of 5 hours ago   0 Had Stroke:  No  Had TIA:  No  Had Thromboembolism:  No     Patients who have had a stroke, TIA, or thromboembolism get 2 points.    Current as of 5 hours ago             Revised Cardiac Risk Index      Flowsheet Row Pre-Admission Testing from 10/25/2024 in [facility]   High-Risk Surgery (Intraperitoneal, Intrathoracic,Suprainguinal vascular) 0 filed at 10/25/2024 1030   History of ischemic heart disease (History of MI, History of positive exercuse test, Current chest paint considered due to myocardial ischemia, Use of nitrate therapy, ECG with pathological Q Waves) 0 filed at 10/25/2024 1030   History of congestive heart failure (pulmonary edemia, bilateral rales or S3 gallop, Paroxysmal nocturnal dyspnea, CXR showing pulmonary vascular  redistribution) 0 filed at 10/25/2024 1030   History of cerebrovascular disease (Prior TIA or stroke) 0 filed at 10/25/2024 1030   Pre-operative insulin treatment 0 filed at 10/25/2024 1030   Pre-operative creatinine>2 mg/dl 0 filed at 10/25/2024 1030   Revised Cardiac Risk Calculator 0 filed at 10/25/2024 1030          Apfel Simplified Score    No data to display       Risk Analysis Index Results This Encounter    No data found in the last 10 encounters.       Stop Bang Score      Flowsheet Row Pre-Admission Testing from 10/25/2024 in [facility]   Do you snore loudly? 0 filed at 10/25/2024 0959   Do you often feel tired or fatigued after your sleep? 1 filed at 10/25/2024 0959   Has anyone ever observed you stop breathing in your sleep? 1 filed at 10/25/2024 0959   Do you have or are you being treated for high blood pressure? 1 filed at 10/25/2024 0959   Recent BMI (Calculated) 45.4 filed at 10/25/2024 0959   Is BMI greater than 35 kg/m2? 1=Yes filed at 10/25/2024 0959   Age older than 50 years old? 1=Yes filed at 10/25/2024 0959   Is your neck circumference greater than 17 inches (Male) or 16 inches (Female)? 1 filed at 10/25/2024 0959   Gender - Male 1=Yes filed at 10/25/2024 0959   STOP-BANG Total Score 7 filed at 10/25/2024 0959          Prodigy: High Risk  Total Score: 16              Prodigy Age Score      Prodigy Gender Score          ARISCAT Score for Postoperative Pulmonary Complications    No data to display       [place] Perioperative Risk for Myocardial Infarction or Cardiac Arrest (DHRUV)    No data to display       Assessment & Plan:    Neuro:  No diagnosis or significant findings on chart review or clinical presentation and evaluation.     HEENT/Airway:  No diagnosis or significant findings on chart review or clinical presentation and evaluation.   STOP-BANG Score-7 points high risk for FLOYD  History of FLOYD-Compliant with CPAP    Mallampati::  III    TM distance::  >3 FB    Neck ROM::   Full  Dentures-reports full dentures  Crowns-denies  Implants-denies    Cardiovascular:  No significant findings on chart review or clinical presentation and evaluation.   History of Atrial fibrillation-RFA completed 9/2024-successful. Managed with Flecanide, Metoprolol, and Eliquis. Last EKG completed 10/2024 SR. HS RRR  History of Hypertension-Managed with Metoprolol and Losartan. BP in /75  History of Hyperlipidemia-Managed with Rosuvastatin  History of Thoracic aortic aneurysm-4.1 cm per CT chest completed 7/19/2024  METS: 7  RCRI: 0 points, 3.9%  risk for postoperative MACE   DHRUV: 0.3% risk for postoperative MACE  EKG -Completed 10/22/2024  Normal sinus rhythm  Low voltage QRS  Borderline ECG  When compared with ECG of 12-SEP-2024 12:17,  Questionable change in QRS duration  Confirmed by Nickolas Bajwa ([id number]) on 10/24/2024 12:14:27 PM  Stress test-completed 8/22/2023  No inducible ischemia.   ECHO-Completed 8/25/2023  LVEF 60-65%    Pulmonary:  No diagnosis or significant findings on chart review or clinical presentation and evaluation.   ARISCAT: <26 points, 1.6% risk of in-hospital postoperative pulmonary complication  PRODIGY: High risk for opioid induced respiratory depression  Smoking History-He quit smoking approximately 21 years ago. Previously smoked 1 PPD x 20 years  Discussed smoking cessation and deep breathing handout given    Renal/Urinary:  No diagnosis or significant findings on chart review or clinical presentation and evaluation, however, the patient is at increased risk of perioperative renal complications secondary to age>/= 56, male sex, HTN, and diabetes. Preventative measures include BP monitoring, medication compliance, and hydration management.   CMP-Pending  Creatinine-  GFR-    Endocrine:  No significant findings on chart review or clinical presentation and evaluation.   History of [id number] with Metformin  [id number]    Hematologic/Immunology:  No diagnosis or significant  findings on chart review or clinical presentation and evaluation.  The patient is not a Jehovah’s witness and will accept blood and blood products if medically indicated.   History of previous blood transfusions No  CBC-Pending  HGB-Pending  Caprini Score 7, patient at High for postoperative DVT. Pt supplied education/VTE handout  Anticoagulation use: Yes Eliquis  Eliquis instructions-OK to DC 48 hours preoperatively per Dr Aydee WILKINS. Patient aware and has no questions at this time.     Gastrointestinal:   No diagnosis or significant findings on chart review or clinical presentation and evaluation.   Recreational drug use: Drug use No  Alcohol use none    Infectious disease:   No diagnosis or significant findings on chart review or clinical presentation and evaluation.     Musculoskeletal:   No significant findings on chart review or clinical presentation and evaluation.   History of Morbid Obesity-BMI 45.44  JHFRAT score-6 points. moderate risk for falls    Anesthesia:  ASA 3 - Patient with moderate systemic disease with functional limitations  Anticipated anesthesia-General  History of General anesthesia- yes  Complications- No anesthesia complications  No family history of anesthesia complications    Abnormalities noted on PAT evaluation: Yes - 2+ TIFFANIE bilaterally. Will screen with PROBNP    Labs & Imaging ordered:  CBC, BMP, UC, PROBNP, HBA1C  Nickel/metal allergy-negative  Shellfish allergy-negative    Overall, patient Moderate Risk for the scheduled Low Risk surgery. Discussed with patient medication instructions, NPO guidelines, and any questions or concerns.     Face to face time-30 minutes     Patient not cleared to DC AC per Dr Hernandez. Must remain on AC 6 months after ablation. Dr Rubio aware and patient is to be rescheduled.

## 2024-10-25 NOTE — PREPROCEDURE INSTRUCTIONS
Medication List            Accurate as of October 25, 2024 10:11 AM. Always use your most recent med list.                apixaban 5 mg tablet  Commonly known as: Eliquis  Take 1 tablet (5 mg) by mouth 2 times a day.  Additional Medication Adjustments for Surgery: Other (Comment)  Notes to patient: Follow prescriber preoperative instructions     flecainide 150 mg tablet  Commonly known as: Tambocor  Take 1 tablet (150 mg) by mouth 2 times a day.  Medication Adjustments for Surgery: Take/Use as prescribed     fluticasone 50 mcg/actuation nasal spray  Commonly known as: Flonase  Administer 1 spray into each nostril once daily. Shake gently. Before first use, prime pump. After use, clean tip and replace cap.  Medication Adjustments for Surgery: Take/Use as prescribed     losartan 50 mg tablet  Commonly known as: Cozaar  Take 1 tablet (50 mg) by mouth once daily.  Medication Adjustments for Surgery: Take last dose 1 day (24 hours) before surgery  Notes to patient: Last dose preoperatively 11/4/2024. AM dose only if applicable. If taken in evening last dose 11/3/2024     metFORMIN  mg 24 hr tablet  Commonly known as: Glucophage-XR  Take 1 tablet (500 mg) by mouth 2 times daily (morning and late afternoon).  Medication Adjustments for Surgery: Do Not take on the morning of surgery  Notes to patient: Last dose preoperatively 11/4/2024     metoprolol tartrate 25 mg tablet  Commonly known as: Lopressor  Take 1 tablet (25 mg) by mouth 2 times a day.  Medication Adjustments for Surgery: Take/Use as prescribed     rosuvastatin 20 mg tablet  Commonly known as: Crestor  Take 1 tablet (20 mg) by mouth once daily.  Medication Adjustments for Surgery: Take/Use as prescribed     tamsulosin 0.4 mg 24 hr capsule  Commonly known as: Flomax  Take 1 capsule (0.4 mg) by mouth once daily.  Medication Adjustments for Surgery: Take/Use as prescribed            NPO Instructions:     Do not eat any food or drink liquid after midnight  the night before your surgery/procedure.  Additional Instructions:      Seven/Six Days before Surgery:  Review your medication instructions, stop indicated medications  Five Days before Surgery:  Review your medication instructions, stop indicated medications  Three Days before Surgery:  Review your medication instructions, stop indicated medications  The Day before Surgery:  No smoking or alcohol use 24 hours before surgery  Review your medication instructions, stop indicated medications  You will be contacted regarding the time of your arrival to facility and surgery time  Do not eat any food after Midnight  Day of Surgery:  Review your medication instructions, take indicated medications  If you have diabetes, please check your fasting blood sugar upon awakening.  If fasting blood sugar is <80 mg/dl, drink 100 ml of apple juice, time limit of 2 hours before  Wear  comfortable loose fitting clothing  Do not use moisturizers, creams, lotions or perfume  All jewelry and valuables should be left at home     CONTACT SURGEON'S OFFICE IF YOU DEVELOP:  * Fever = 100.4 F   * New respiratory symptoms (e.g. cough, shortness of breath, respiratory distress, sore throat)  * Recent loss of taste or smell  *Flu like symptoms such as headache, fatigue or gastrointestinal symptoms  * You develop any open sores, shingles, burning or painful urination   AND/OR:  * You no longer wish to have the surgery.  * Any other personal circumstances change that may lead to the need to cancel or defer this surgery.  *You were admitted to any hospital within one week of your planned procedure.     SMOKING:  *Quitting smoking can make a huge difference to your health and recovery from surgery.    *If you need help with quitting, call 8-458-QUIT-NOW.     THE DAY BEFORE SURGERY:  *Do not eat any food after midnight the night before your surgery.   SURGICAL TIME:  *You will be contacted between 2 p.m. and 3 p.m. the business day before your surgery  with your arrival time.  *If you haven't received a call by 3pm, call [phone]  *Scheduled surgery times may change and you will be notified if this occurs-check your personal voicemail for any updates.     ON THE MORNING OF SURGERY:  *Wear comfortable, loose fitting clothing.   *Do not use moisturizers, creams, lotions or perfume.  *All jewelry and valuables should be left at home.  *Prosthetic devices such as contact lenses, hearing aids, dentures, eyelash extensions, hairpins and body piercing must be removed before surgery.     BRING WITH YOU:  *Photo ID and insurance card  *Current list of medications and allergies  *Pacemaker/Defibrillator/Heart stent cards  *CPAP machine and mask  *Slings/splints/crutches  *Copy of your complete Advanced Directive/DHPOA-if applicable  *Neurostimulator implant remote     PARKING AND ARRIVAL:  *Check in at the Main Entrance desk and let them know you are here for surgery.     IF YOU ARE HAVING OUTPATIENT/SAME DAY SURGERY:  *A responsible adult MUST accompany you at the time of discharge and stay with you for 24 hours after your surgery.  *You may NOT drive yourself home after surgery.  *You may use a taxi or ride sharing service ([employer], Uber) to return home ONLY if you are accompanied by a friend or family member.  *Instructions for resuming your medications will be provided by your surgeon.     Thank you for coming to Pre Admission testing.      If I have prescribe medication please don't forget to  at your pharmacy.      Any questions about today's visit call [phone] and leave a message in the general mailbox.     Patient instructed to ambulate as soon as possible postoperatively to decrease thromboembolic risk.     Vernon Carrion, APRN-CNP     Thank you for visiting [facility].  If you have any changes to your health condition, please call the surgeons office to alert them and give them details of your symptoms.        Preoperative  Fasting Guidelines     Why must I stop eating and drinking near surgery time?  With sedation, food or liquid in your stomach can enter your lungs causing serious complications  Increases nausea and vomiting     When do I need to stop eating and drinking before my surgery?  Do not eat any food after midnight the night before your surgery/procedure.        Additional Instructions:      The Day before Surgery:  -Review your medication instructions, stop indicated medications  -You will be contacted in the evening regarding the time of your arrival to facility and surgery time     Day of Surgery:  -Review your medication instructions, take indicated medications  -Wear comfortable loose fitting clothing  -Do not use moisturizers, creams, lotions or perfume  -All jewelry and valuables should be left at home                   Preoperative Brain Exercises     What are brain exercises?  A brain exercise is any activity that engages your thinking (cognitive) skills.     What types of activities are considered brain exercises?  Jigsaw puzzles, crossword puzzles, word jumble, memory games, word search, and many more.  Many can be found free online or on your phone via a mobile fatou.     Why should I do brain exercises before my surgery?  More recent research has shown brain exercise before surgery can lower the risk of postoperative delirium (confusion) which can be especially important for older adults.  Patients who did brain exercises for 5 to 10 hours the days before surgery, cut their risk of postoperative delirium in half up to 1 week after surgery.                         [facility]     Preoperative Deep Breathing Exercises     Why it is important to do deep breathing exercises before my surgery?  Deep breathing exercises strengthen your breathing muscles.  This helps you to recover after your surgery and decreases the chance of breathing complications.        How are the deep breathing  exercises done?  Sit straight with your back supported.  Breathe in deeply and slowly through your nose. Your lower rib cage should expand and your abdomen may move forward.  Hold that breath for 3 to 5 seconds.  Breathe out through pursed lips, slowly and completely.  Rest and repeat 10 times every hour while awake.  Rest longer if you become dizzy or lightheaded.                      [facility]     Preoperative Deep Breathing Exercises     Why it is important to do deep breathing exercises before my surgery?  Deep breathing exercises strengthen your breathing muscles.  This helps you to recover after your surgery and decreases the chance of breathing complications.        How are the deep breathing exercises done?  Sit straight with your back supported.  Breathe in deeply and slowly through your nose. Your lower rib cage should expand and your abdomen may move forward.  Hold that breath for 3 to 5 seconds.  Breathe out through pursed lips, slowly and completely.  Rest and repeat 10 times every hour while awake.  Rest longer if you become dizzy or lightheaded.        Patient Information: Incentive Spirometer  What is an incentive spirometer?  An incentive spirometer is a device used before and after surgery to “exercise” your lungs.  It helps you to take deeper breaths to expand your lungs.  Below is an example of a basic incentive spirometer.  The device you receive may differ slightly but they all function the same.    Why do I need to use an incentive spirometer?  Using your incentive spirometer prepares your lungs for surgery and helps prevent lung problems after surgery.  How do I use my incentive spirometer?  When you're using your incentive spirometer, make sure to breathe through your mouth. If you breathe through your nose, the incentive spirometer won't work properly. You can hold your nose if you have trouble.  If you feel dizzy at any time, stop and rest. Try again at a later  time.  Follow the steps below:  Set up your incentive spirometer, expand the flexible tubing and connect to the outlet.  Sit upright in a chair or bed. Hold the incentive spirometer at eye level.   Put the mouthpiece in your mouth and close your lips tightly around it. Slowly breathe out (exhale) completely.  Breathe in (inhale) slowly through your mouth as deeply as you can. As you take a breath, you will see the piston rise inside the large column. While the piston rises, the indicator should move upwards. It should stay in between the 2 arrows (see Figure).  Try to get the piston as high as you can, while keeping the indicator between the arrows.   If the indicator doesn't stay between the arrows, you're breathing either too fast or too slow.  When you get it as high as you can, hold your breath for 10 seconds, or as long as possible. While you're holding your breath, the piston will slowly fall to the base of the spirometer.  Once the piston reaches the bottom of the spirometer, breathe out slowly through your mouth. Rest for a few seconds.  Repeat 10 times. Try to get the piston to the same level with each breath.  Repeat every hour while awake  You can carefully clean the outside of the mouthpiece with an alcohol wipe or soap and water.       Patient and Family Education             Ways You Can Help Prevent Blood Clots                    This handout explains some simple things you can do to help prevent blood clots.      Blood clots are blockages that can form in the body's veins. When a blood clot forms in your deep veins, it may be called a deep vein thrombosis, or DVT for short. Blood clots can happen in any part of the body where blood flows, but they are most common in the arms and legs. If a piece of a blood clot breaks free and travels to the lungs, it is called a pulmonary embolus (PE). A PE can be a very serious problem.         Being in the hospital or having surgery can raise your chances of  getting a blood clot because you may not be well enough to move around as much as you normally do.         Ways you can help prevent blood clots in the hospital           Wearing SCDs. SCDs stands for Sequential Compression Devices.   SCDs are special sleeves that wrap around your legs  They attach to a pump that fills them with air to gently squeeze your legs every few minutes.   This helps return the blood in your legs to your heart.   SCDs should only be taken off when walking or bathing.   SCDs may not be comfortable, but they can help save your life.                                            Wearing compression stockings - if your doctor orders them. These special snug fitting stockings gently squeeze your legs to help blood flow.       Walking. Walking helps move the blood in your legs.   If your doctor says it is ok, try walking the halls at least   5 times a day. Ask us to help you get up, so you don't fall.      Taking any blood thinning medicines your doctor orders.        Page 1 of 2            [facility]; 3/23   Ways you can help prevent blood clots at home         Wearing compression stockings - if your doctor orders them. ? Walking - to help move the blood in your legs.       Taking any blood thinning medicines your doctor orders.      Signs of a blood clot or PE        Tell your doctor or nurse know right away if you have of the problems listed below.    If you are at home, seek medical care right away. Call 911 for chest pain or problems breathing.          Signs of a blood clot (DVT) - such as pain,  swelling, redness or warmth in your arm or leg      Signs of a pulmonary embolism (PE) - such as chest pain or feeling short of breath

## 2024-10-26 LAB
EST. AVERAGE GLUCOSE BLD GHB EST-MCNC: 163 MG/DL
HBA1C MFR BLD: 7.3 %

## 2024-10-27 LAB — BACTERIA UR CULT: NORMAL

## 2024-10-29 ENCOUNTER — APPOINTMENT (OUTPATIENT)
Dept: PHARMACY | Facility: HOSPITAL | Age: 64
End: 2024-10-29
Payer: COMMERCIAL

## 2024-10-29 DIAGNOSIS — E66.01 MORBID OBESITY (MULTI): ICD-10-CM

## 2024-10-29 DIAGNOSIS — E11.9 TYPE 2 DIABETES MELLITUS WITHOUT COMPLICATION, WITHOUT LONG-TERM CURRENT USE OF INSULIN (MULTI): ICD-10-CM

## 2024-10-29 RX ORDER — SEMAGLUTIDE 0.68 MG/ML
0.25 INJECTION, SOLUTION SUBCUTANEOUS WEEKLY
Qty: 3 ML | Refills: 1 | Status: SHIPPED | OUTPATIENT
Start: 2024-10-29

## 2024-11-01 ENCOUNTER — TELEPHONE (OUTPATIENT)
Dept: UROLOGY | Facility: CLINIC | Age: 64
End: 2024-11-01

## 2024-11-01 ENCOUNTER — HOSPITAL ENCOUNTER (OUTPATIENT)
Dept: RESPIRATORY THERAPY | Facility: HOSPITAL | Age: 64
Discharge: HOME | End: 2024-11-01
Payer: COMMERCIAL

## 2024-11-01 DIAGNOSIS — R94.2 ABNORMAL PFT: Primary | ICD-10-CM

## 2024-11-01 DIAGNOSIS — R06.02 SHORTNESS OF BREATH: ICD-10-CM

## 2024-11-01 DIAGNOSIS — R06.2 WHEEZING: ICD-10-CM

## 2024-11-01 PROCEDURE — 94726 PLETHYSMOGRAPHY LUNG VOLUMES: CPT

## 2024-11-01 PROCEDURE — 94729 DIFFUSING CAPACITY: CPT | Performed by: INTERNAL MEDICINE

## 2024-11-01 PROCEDURE — 94060 EVALUATION OF WHEEZING: CPT | Performed by: INTERNAL MEDICINE

## 2024-11-01 PROCEDURE — 94726 PLETHYSMOGRAPHY LUNG VOLUMES: CPT | Performed by: INTERNAL MEDICINE

## 2024-11-21 ENCOUNTER — APPOINTMENT (OUTPATIENT)
Dept: UROLOGY | Facility: CLINIC | Age: 64
End: 2024-11-21
Payer: COMMERCIAL

## 2024-11-21 ENCOUNTER — OFFICE VISIT (OUTPATIENT)
Dept: CARDIOLOGY | Facility: HOSPITAL | Age: 64
End: 2024-11-21
Payer: COMMERCIAL

## 2024-11-21 VITALS
BODY MASS INDEX: 39.17 KG/M2 | DIASTOLIC BLOOD PRESSURE: 60 MMHG | HEIGHT: 75 IN | WEIGHT: 315 LBS | HEART RATE: 64 BPM | SYSTOLIC BLOOD PRESSURE: 118 MMHG | OXYGEN SATURATION: 93 %

## 2024-11-21 DIAGNOSIS — I48.92 ATRIAL FLUTTER, UNSPECIFIED TYPE (MULTI): Primary | ICD-10-CM

## 2024-11-21 DIAGNOSIS — E78.5 HYPERLIPIDEMIA, UNSPECIFIED: ICD-10-CM

## 2024-11-21 DIAGNOSIS — I71.21 ANEURYSM OF ASCENDING AORTA WITHOUT RUPTURE (CMS-HCC): ICD-10-CM

## 2024-11-21 DIAGNOSIS — R06.09 DYSPNEA ON EXERTION: ICD-10-CM

## 2024-11-21 DIAGNOSIS — I10 BENIGN ESSENTIAL HYPERTENSION: ICD-10-CM

## 2024-11-21 DIAGNOSIS — E78.00 PURE HYPERCHOLESTEROLEMIA: ICD-10-CM

## 2024-11-21 DIAGNOSIS — E66.01 MORBID OBESITY (MULTI): ICD-10-CM

## 2024-11-21 LAB
ATRIAL RATE: 64 BPM
P AXIS: 62 DEGREES
P OFFSET: 159 MS
P ONSET: 82 MS
PR INTERVAL: 274 MS
Q ONSET: 219 MS
QRS COUNT: 10 BEATS
QRS DURATION: 106 MS
QT INTERVAL: 422 MS
QTC CALCULATION(BAZETT): 435 MS
QTC FREDERICIA: 431 MS
R AXIS: -26 DEGREES
T AXIS: 51 DEGREES
T OFFSET: 430 MS
VENTRICULAR RATE: 64 BPM

## 2024-11-21 PROCEDURE — 3078F DIAST BP <80 MM HG: CPT | Performed by: NURSE PRACTITIONER

## 2024-11-21 PROCEDURE — 3008F BODY MASS INDEX DOCD: CPT | Performed by: NURSE PRACTITIONER

## 2024-11-21 PROCEDURE — 4010F ACE/ARB THERAPY RXD/TAKEN: CPT | Performed by: NURSE PRACTITIONER

## 2024-11-21 PROCEDURE — 99214 OFFICE O/P EST MOD 30 MIN: CPT | Performed by: NURSE PRACTITIONER

## 2024-11-21 PROCEDURE — 3061F NEG MICROALBUMINURIA REV: CPT | Performed by: NURSE PRACTITIONER

## 2024-11-21 PROCEDURE — 3074F SYST BP LT 130 MM HG: CPT | Performed by: NURSE PRACTITIONER

## 2024-11-21 PROCEDURE — 3048F LDL-C <100 MG/DL: CPT | Performed by: NURSE PRACTITIONER

## 2024-11-21 PROCEDURE — 93005 ELECTROCARDIOGRAM TRACING: CPT | Performed by: NURSE PRACTITIONER

## 2024-11-21 PROCEDURE — 1159F MED LIST DOCD IN RCRD: CPT | Performed by: NURSE PRACTITIONER

## 2024-11-21 PROCEDURE — 3051F HG A1C>EQUAL 7.0%<8.0%: CPT | Performed by: NURSE PRACTITIONER

## 2024-11-21 RX ORDER — POLYETHYLENE GLYCOL 3350, SODIUM CHLORIDE, SODIUM BICARBONATE, POTASSIUM CHLORIDE 420; 11.2; 5.72; 1.48 G/4L; G/4L; G/4L; G/4L
4000 POWDER, FOR SOLUTION ORAL ONCE
COMMUNITY
Start: 2024-03-25

## 2024-11-21 RX ORDER — POLYETHYLENE GLYCOL-3350 AND ELECTROLYTES 236; 6.74; 5.86; 2.97; 22.74 G/274.31G; G/274.31G; G/274.31G; G/274.31G; G/274.31G
POWDER, FOR SOLUTION ORAL
COMMUNITY
Start: 2024-11-06

## 2024-11-21 RX ORDER — ROSUVASTATIN CALCIUM 20 MG/1
10 TABLET, COATED ORAL DAILY
Start: 2024-11-21

## 2024-11-21 NOTE — PROGRESS NOTES
Primary Care Physician: Vernon Estrada MD  Date of Visit: 11/21/2024  8:40 AM EST  Location of visit: [facility]     Chief Complaint:   Chief Complaint   Patient presents with    Follow-up        HPI / Summary:   Eligio Hogue is a 64 y.o. male presents for followup. He had RFA ablation for atrial fibrillation by Dr. Bajwa in September. He was started on Flecainide 150 BID after ablation. Since this time he has been feeling more fatigued and decreased energy levels. He states he was feeling better prior to the ablation. He has not had any alerts on his Fitbit of atrial fibrillation. He has not had any palpitations. He has noted mild dyspnea when doing activity such as hanging out door anayeli lights. He will have to take breaks when doing some activity more so due to the fatigue. He has been wearing cpap 3 to 4 hours at night. He has noted his thighs feeling weak when walking a prolonged distance. He has chronic lower extremity edema for years that remains unchanged and at baseline. He has not been exercising. The patient denies chest pain, palpitations, lightheadedness, syncope, orthopnea, paroxysmal nocturnal dyspnea, or bleeding problems.     He has been monitoring his blood pressure at home. Reports systolic blood pressure 130 to 140 at home.          Past Medical History:  Past Medical History:   Diagnosis Date    A-fib (Multi)     Atrial flutter (Multi) 09/26/2023    Benign essential hypertension 06/10/2024    DM2 (diabetes mellitus, type 2) (Multi)     GERD (gastroesophageal reflux disease) 06/10/2024    Hyperlipidemia 06/10/2024    Hypothyroidism 06/10/2024    Other chest pain 02/16/2021    Chest tightness or pressure    Personal history of other benign neoplasm     History of benign neoplasm of pharynx    Suspected sleep apnea 06/14/2024        Past Surgical History:  Past Surgical History:   Procedure Laterality Date    CARDIAC ELECTROPHYSIOLOGY PROCEDURE N/A 9/12/2024    Procedure: Ablation  A-Fib;  Surgeon: Nickolas Bajwa MD;  Location: [facility];  Service: Electrophysiology;  Laterality: N/A;    OTHER SURGICAL HISTORY  03/10/2016    Excision Of Tracheal Tumor    OTHER SURGICAL HISTORY  08/09/2018    Oral Surgery Tooth Extraction [place] To[facility]          Social History:   reports that he has quit smoking. His smoking use included cigarettes. He has never used smokeless tobacco. He reports current alcohol use. He reports that he does not use drugs.     Family History:  family history includes Diabetes in his brother; Heart disease in his mother.      Allergies:  Allergies   Allergen Reactions    Penicillins Other     Blisters in mouth      Omeprazole Rash       Outpatient Medications:  Current Outpatient Medications   Medication Instructions    apixaban (ELIQUIS) 5 mg, oral, 2 times daily    flecainide (TAMBOCOR) 150 mg, oral, 2 times daily    fluticasone (Flonase) 50 mcg/actuation nasal spray 1 spray, Each Nostril, Daily, Shake gently. Before first use, prime pump. After use, clean tip and replace cap.    GaviLyte-G 236-22.74-6.74 -5.86 gram solution This medication is for patient's upcoming colonoscopy in January 2025.    losartan (COZAAR) 50 mg, oral, Daily    metFORMIN XR (GLUCOPHAGE-XR) 500 mg, oral, 2 times daily (morning and late afternoon)    metoprolol tartrate (LOPRESSOR) 25 mg, oral, 2 times daily    Ozempic 0.25 mg, subcutaneous, Weekly    polyethylene glycol-electrolytes (Nulytely) 420 gram solution 4,000 mL, Once    rosuvastatin (CRESTOR) 20 mg, oral, Daily    tamsulosin (FLOMAX) 0.4 mg, oral, Daily       Physical Exam:  There were no vitals filed for this visit.  Wt Readings from Last 5 Encounters:   10/25/24 (!) 165 kg (363 lb 8.6 oz)   10/22/24 (!) 164 kg (362 lb 6.4 oz)   09/26/24 (!) 165 kg (364 lb 8 oz)   09/20/24 (!) 163 kg (359 lb 5.6 oz)   09/12/24 (!) 163 kg (360 lb)     There is no height or weight on file to calculate BMI.     GENERAL: alert, cooperative, pleasant, in  no acute distress  SKIN: warm and dry  NECK: Normal JVD, negative HJR  CARDIAC: Regular rate and rhythm with no rubs, murmurs, or gallops  CHEST: Normal respiratory efforts, lungs clear to auscultation bilaterally.  ABDOMEN: soft, nontender, nondistended  EXTREMITIES: +1 bilateral lower extremity edema, +2 palpable RP and +2 palpable DP pulses bilaterally       Last Labs:  Recent Labs     10/25/24  1059 08/30/24  1044 06/17/24  1506   WBC 8.4 9.4 8.3   HGB 13.2* 13.9 13.6   HCT 40.6* 43.1 42.6    335 284   MCV 86 88 92     Recent Labs     10/25/24  1059 08/30/24  1044 06/17/24  1506    138 142   K 4.7 4.9 4.8    100 104   BUN 20 18 19   CREATININE 1.17 1.26 1.21     CMP -  Lab Results   Component Value Date    CALCIUM 8.6 10/25/2024    PROT 6.0 (L) 06/17/2024    ALBUMIN 4.0 06/17/2024    AST 14 06/17/2024    ALT 16 06/17/2024    ALKPHOS 61 06/17/2024    BILITOT 0.6 06/17/2024       LIPID PANEL -   Lab Results   Component Value Date    CHOL 89 06/17/2024    HDL 30.3 06/17/2024    LDLF 35 09/08/2023    TRIG 187 (H) 06/17/2024   LDL 37 6/17/24    Lab Results   Component Value Date     (H) 10/25/2024    HGBA1C 7.3 (H) 10/25/2024       Last Cardiology Tests:  ECG:  Obtained and reviewed EKG- normal sinus rhythm HR 64, with first degree AV block, low voltage QRS    Holter monitor September 2023  27% atrial fibrillation/flutter burden with the longest episode lasting 12 hours and 53 minutes.  Maximum heart rate 153 bpm and average heart rate 70 bpm minimum heart rate 41 bpm.     Echo:  Echocardiogram August 25, 2023  Normal LV function with an ejection fraction of 60 to 65%  Trace to mild aortic regurgitation  Mild mitral regurgitation  No evidence of LVH with a septal measurement of 1.0 cm and a posterior wall measurement of 1.0 cm  Normal diastolic filling  The left atrium was normal in size  Mild to moderate tricuspid regurgitation with an estimated right ventricular systolic pressure of 26  mmHg        Cath:        Stress Test:  Exercise nuclear stress test August 22, 2023  The patient exercised for 7 minutes on a standard Amadou protocol achieving 7 METS.  Peak heart rate was 125 bpm which was 80% of his maximum predicted heart rate.  Peak blood pressure was 180/86.  Nonischemic EKG at 80% maximum predicted heart rate.  SPECT images were normal with diaphragmatic attenuation artifact.  Ejection fraction 60%    Cardiac Imaging:  CT cardiac scoring March 5, 2021  FINDINGS:  The score and distribution of calcium in the coronary arteries is as  follows:     LM            0  LAD           0  LCx           0  RCA           0     Total          0        The heart size is normal and there is no pericardial effusion. There  is dilatation of the ascending aorta measuring 4.2 cm in diameter.  This appears slightly increased from the prior exam.    Assessment/Plan   Problem List Items Addressed This Visit          Cardiac and Vasculature    Atrial flutter (Multi) - Primary    Relevant Orders    ECG 12 lead (Clinic Performed)    Benign essential hypertension    Hyperlipidemia     Other Visit Diagnoses       Dyspnea on exertion        Relevant Orders    Transthoracic Echo (TTE) Complete    Morbid obesity (Multi)        Aneurysm of ascending aorta without rupture (CMS-HCC)        Hyperlipidemia, unspecified        Relevant Medications    rosuvastatin (Crestor) 20 mg tablet          In summary Mr. Hogue is a pleasant 64-year-old white male with a past medical history significant for persistent atrial fibrillation, hypertension, hyperlipidemia with a CT calcium score of 0 in 2021, type II non-insulin-requiring diabetes, morbid obesity, and mildly dilated ascending aorta. He has noted fatigue and decreased energy levels since having his ablation and starting on Flecainide. He has follow up with Dr. Bajwa in December at which time he will discuss Flecainide. Given his dyspnea on exertion and fatigue I did order an  echocardiogram for surveillance of LV function. I suspect dyspnea on exertion is multifactorial secondary to deconditioning and obesity. He is euvolemic by clinical exam. His blood pressure is at goal today. I have encouraged him to modify diet, increase physical activity, and lose weight. He will follow up with sleep medicine as his fatigue may be related to sleep apnea as he does not wear CPAP all night. He also has noted weakness in his thighs when ambulating. I suspect his may be due to deconditioning, but I did decrease Rosuvastatin to see if this improved symptoms as thigh discomfort could be related. He will let me know in a few weeks if discomfort is better. He is scheduled to have prostate biopsy on 12/17 which is more than 3 months out from his ablation. He is ok to hold Eliquis for 2 days prior to procedure as instructed and resume when safe to do as instructed by performing physician. He will continue current cardiovascular medications. He will follow up in 2 months. He will benefit from CT of chest without contrast in July 2025 for surveillance of aortic aneurysm.                     Orders:  No orders of the defined types were placed in this encounter.     Followup Appts:  Future Appointments   Date Time Provider Department Center   12/3/2024 10:15 AM [facility] ROOM 02 [facility]   12/3/2024  2:40 PM Yisel Jeter, PharmD [id number] [facility]   12/10/2024  4:00 PM Nickolas Bajwa MD [id number] [facility]   12/16/2024  1:45 PM Vernon Estrada MD [id number] [place]   12/23/2024  4:00 PM Bradley Oswald DO [id number] [facility]   1/2/2025 10:10 AM Nolberto Rubio MD [id number] [facility]   3/6/2025  4:00 PM Autumn Rodriguez, APRN-CNP [id number] [facility]           ____________________________________________________________  MELBA Jennings-CNP  [facility]

## 2024-11-21 NOTE — PATIENT INSTRUCTIONS
Continue current meds  Follow up with Dr. Bajwa   Echocardiogram   Increase physical activity and lose weight  Consider Mediterranean diet  Consider Ozempic  Decrease Rosuvastatin to 10 mg once a day ( you can cut the 20 mg tablet in half)  Follow up in 2 months

## 2024-12-02 NOTE — CPM/PAT H&P
CPM/PAT Evaluation       Name: Eligio Hogue (Eligio Hogue)  /Age: 1960/64 y.o.     Visit Type:   In-Person       Chief Complaint: abnormal MRI of pelvis    HPI 65 yo male referred to PAT by Dr Rubio for evaluation in advance of his Transrectal US and fusion 24.  He has had gross hematuria, incomplete bladder emptying and UTI.   24 MR reports:  IMPRESSION:  A PI-RADS 4 lesion in the left mid posteriorperipheral zone. There is  broad-based abutment with the adjacent prostatic capsule with focal  irregularity along the capsular margin suspicious for extracapsular  extension. No evidence of enlarged pelvic lymph nodes.      PI-RADS 4 - High (clinically significant cancer is likely to be  present).    See PMH below    Past Medical History:   Diagnosis Date    A-fib (Multi)     Atrial flutter (Multi) 2023    Benign essential hypertension 06/10/2024    BPH (benign prostatic hyperplasia)     Colon polyp     COPD (chronic obstructive pulmonary disease) (Multi)     DM2 (diabetes mellitus, type 2) (Multi)     GERD (gastroesophageal reflux disease) 06/10/2024    Hyperlipidemia 06/10/2024    Irregular heart beat     Other chest pain 2021    Chest tightness or pressure    Personal history of other benign neoplasm     History of benign neoplasm of pharynx    Sleep apnea     Suspected sleep apnea 2024       Past Surgical History:   Procedure Laterality Date    ABLATION OF DYSRHYTHMIC FOCUS      CARDIAC ELECTROPHYSIOLOGY PROCEDURE N/A 2024    Procedure: Ablation A-Fib;  Surgeon: Nickolas Bajwa MD;  Location: [facility];  Service: Electrophysiology;  Laterality: N/A;    COLONOSCOPY      OTHER SURGICAL HISTORY  03/10/2016    Excision Of Tracheal Tumor    OTHER SURGICAL HISTORY  2018    Oral Surgery Tooth Extraction [facility]       Patient Sexual activity questions deferred to the physician.    Family History   Problem Relation Name Age of Onset    Heart disease Mother       Diabetes Brother         Allergies   Allergen Reactions    Penicillins Other     Blisters in mouth      Omeprazole Rash       Medication Documentation Review Audit       Reviewed by Brigid Garcia RN (Registered Nurse) on 12/03/24 at 1036      Medication Order Taking? Sig Documenting Provider Last Dose Status   apixaban (Eliquis) 5 mg tablet [id number] Yes Take 1 tablet (5 mg) by mouth 2 times a day. Zeyad Hernandez MD 12/3/2024 Active   flecainide (Tambocor) 150 mg tablet [id number] Yes Take 1 tablet (150 mg) by mouth 2 times a day. MELBA Acuña-CNP, DNP 12/3/2024 Active   fluticasone (Flonase) 50 mcg/actuation nasal spray [id number] Yes Administer 1 spray into each nostril once daily. Shake gently. Before first use, prime pump. After use, clean tip and replace cap. MELBA Weiss-CNP Past Month Active   GaviLyte-G 236-22.74-6.74 -5.86 gram solution [id number] No This medication is for patient's upcoming colonoscopy in January 2025.   Patient not taking: Reported on 12/3/2024    Historical Provider, MD Not Taking Active   losartan (Cozaar) 50 mg tablet [id number] Yes Take 1 tablet (50 mg) by mouth once daily. Vernon Estrada MD 12/3/2024 Active   metFORMIN  mg 24 hr tablet [id number] Yes Take 1 tablet (500 mg) by mouth 2 times daily (morning and late afternoon). Vernon Estrada MD 12/3/2024 Active   metoprolol tartrate (Lopressor) 25 mg tablet [id number] Yes Take 1 tablet (25 mg) by mouth 2 times a day. Vernon Estrada MD 12/3/2024 Active   polyethylene glycol-electrolytes (Nulytely) 420 gram solution [id number] No Take 4,000 mL by mouth 1 time.   Patient not taking: Reported on 12/3/2024    Historical Provider, MD Not Taking Active   rosuvastatin (Crestor) 20 mg tablet [id number] Yes Take 0.5 tablets (10 mg) by mouth once daily. MELBA Jennings-CNP 12/3/2024 Active    Patient not taking:   Discontinued 12/03/24 1036   tamsulosin (Flomax) 0.4 mg 24 hr capsule [id number] Yes Take 1  "capsule (0.4 mg) by mouth once daily. Beth Hendricks MD 12/3/2024 Active                         PAT ROS:   Constitutional:   neg    Neuro/Psych:   neg    Eyes:   neg    Ears:   neg    Nose:   neg    Mouth:   neg    Throat:   neg    Neck:   neg    Cardio:    peripheral edema   SCHULTZ  Respiratory:   neg    Endocrine:   neg    GI:   neg    :    See HPI   difficulty urinating   dysuria  Musculoskeletal:   neg    Hematologic:   neg    Skin:  neg        Physical Exam  Vitals and nursing note reviewed. Physical exam within normal limits.   Musculoskeletal:      Right lower leg: Edema present.      Left lower leg: Edema present.      Comments: L>R edema          PAT AIRWAY:   Airway:     Mallampati::  III    TM distance::  >3 FB    Neck ROM::  Full   lower dentures and upper dentures      Visit Vitals  /82   Pulse 58   Temp 35.6 °C (96.1 °F) (Temporal)   Resp 16   Ht 1.905 m (6' 3\")   Wt (!) 164 kg (361 lb 1.8 oz)   SpO2 97%   BMI 45.14 kg/m²   Smoking Status Former   BSA 2.95 m²           DASI Risk Score      Flowsheet Row Pre-Admission Testing from 12/3/2024 in [facility] Pre-Admission Testing from 10/25/2024 in [facility]   Can you take care of yourself (eat, dress, bathe, or use toilet)?  2.75 filed at 12/03/2024 1108 2.75 filed at 10/25/2024 1029   Can you walk indoors, such as around your house? 1.75 filed at 12/03/2024 1108 1.75 filed at 10/25/2024 1029   Can you walk a block or two on level ground?  2.75 filed at 12/03/2024 1108 2.75 filed at 10/25/2024 1029   Can you climb a flight of stairs or walk up a hill? 5.5 filed at 12/03/2024 1108 5.5 filed at 10/25/2024 1029   Can you run a short distance? 8 filed at 12/03/2024 1108 0 filed at 10/25/2024 1029   Can you do light work around the house like dusting or washing dishes? 2.7 filed at 12/03/2024 1108 2.7 filed at 10/25/2024 1029   Can you do moderate work around the house like vacuuming, sweeping floors or carrying " groceries? 3.5 filed at 12/03/2024 1108 3.5 filed at 10/25/2024 1029   Can you do heavy work around the house like scrubbing floors or lifting and moving heavy furniture?  8 filed at 12/03/2024 1108 0 filed at 10/25/2024 1029   Can you do yard work like raking leaves, weeding or pushing a mower? 4.5 filed at 12/03/2024 1108 4.5 filed at 10/25/2024 1029   Can you have sexual relations? 5.25 filed at 12/03/2024 1108 5.25 filed at 10/25/2024 1029   Can you participate in moderate recreational activities like golf, bowling, dancing, doubles tennis or throwing a baseball or football? 6 filed at 12/03/2024 1108 6 filed at 10/25/2024 1029   Can you participate in strenous sports like swimming, singles tennis, football, basketball, or skiing? 0 filed at 12/03/2024 1108 0 filed at 10/25/2024 1029   DASI SCORE 50.7 filed at 12/03/2024 1108 34.7 filed at 10/25/2024 1029   METS Score (Will be calculated only when all the questions are answered) 9 filed at 12/03/2024 1108 7 filed at 10/25/2024 1029          Caprini DVT Assessment      Flowsheet Row Pre-Admission Testing from 12/3/2024 in [facility] Pre-Admission Testing from 10/25/2024 in [facility]   DVT Score 6 filed at 12/03/2024 1111 7 filed at 10/25/2024 1028   Medical Factors -- Swollen legs filed at 10/25/2024 1028   Surgical Factors Minor surgery planned filed at 12/03/2024 1111 Minor surgery planned filed at 10/25/2024 1028   BMI 41-50 (Morbid obesity) filed at 12/03/2024 1111 41-50 (Morbid obesity) filed at 10/25/2024 1028          Modified Frailty Index      Flowsheet Row Pre-Admission Testing from 10/25/2024 in [facility]   Non-independent functional status (problems with dressing, bathing, personal grooming, or cooking) 0 filed at 10/25/2024 1030   History of diabetes mellitus  0.0909 filed at 10/25/2024 1030   History of COPD 0 filed at 10/25/2024 1030   History of CHF No filed at 10/25/2024 1030   History of MI 0  filed at 10/25/2024 1030   History of Percutaneous Coronary Intervention, Cardiac Surgery, or Angina No filed at 10/25/2024 1030   Hypertension requiring the use of medication  0.0909 filed at 10/25/2024 1030   Peripheral vascular disease 0 filed at 10/25/2024 1030   Impaired sensorium (cognitive impairement or loss, clouding, or delirium) 0 filed at 10/25/2024 1030   TIA or CVA withouy residual deficit 0 filed at 10/25/2024 1030   Cerebrovascular accident with deficit 0 filed at 10/25/2024 1030   Modified Frailty Index Calculator .1818 filed at 10/25/2024 1030          CHADS2 Stroke Risk  Current as of 7 hours ago        4% 3 to 100%: High Risk   2 to < 3%: Medium Risk   0 to < 2%: Low Risk     Last Change: N/A          This score determines the patient's risk of having a stroke if the patient has atrial fibrillation.          Points Metrics   0 Has Congestive Heart Failure:  No     Patients with congestive heart failure get 1 point.    Current as of 7 hours ago   1 Has Hypertension:  Yes     Patients with hypertension get 1 point.    Current as of 7 hours ago   0 Age:  64     Patients who are 75 years of age or older get 1 point.    Current as of 7 hours ago   1 Has Diabetes Excluding Gestational Diabetes:  Yes     Patients with diabetes get 1 point.    Current as of 7 hours ago   0 Had Stroke:  No  Had TIA:  No  Had Thromboembolism:  No     Patients who have had a stroke, TIA, or thromboembolism get 2 points.    Current as of 7 hours ago             Revised Cardiac Risk Index      Flowsheet Row Pre-Admission Testing from 12/3/2024 in [facility] Pre-Admission Testing from 10/25/2024 in [facility]   High-Risk Surgery (Intraperitoneal, Intrathoracic,Suprainguinal vascular) 0 filed at 12/03/2024 1117 0 filed at 10/25/2024 1030   History of ischemic heart disease (History of MI, History of positive exercuse test, Current chest paint considered due to myocardial ischemia, Use of  nitrate therapy, ECG with pathological Q Waves) 0 filed at 12/03/2024 1117 0 filed at 10/25/2024 1030   History of congestive heart failure (pulmonary edemia, bilateral rales or S3 gallop, Paroxysmal nocturnal dyspnea, CXR showing pulmonary vascular redistribution) 0 filed at 12/03/2024 1117 0 filed at 10/25/2024 1030   History of cerebrovascular disease (Prior TIA or stroke) 0 filed at 12/03/2024 1117 0 filed at 10/25/2024 1030   Pre-operative insulin treatment 0 filed at 12/03/2024 1117 0 filed at 10/25/2024 1030   Pre-operative creatinine>2 mg/dl 0 filed at 12/03/2024 1117 0 filed at 10/25/2024 1030   Revised Cardiac Risk Calculator 0 filed at 12/03/2024 1117 0 filed at 10/25/2024 1030          Apfel Simplified Score      Flowsheet Row Pre-Admission Testing from 12/3/2024 in [facility]   Smoking status 1 filed at 12/03/2024 1109   History of motion sickness or PONV  0 filed at 12/03/2024 1109   Use of postoperative opioids 0 filed at 12/03/2024 1109   Gender - Female 0=No filed at 12/03/2024 1109   Apfel Simplified Score Calculator 1 filed at 12/03/2024 1109          Risk Analysis Index Results This Encounter    No data found in the last 10 encounters.       Stop Bang Score      Flowsheet Row Pre-Admission Testing from 12/3/2024 in [facility] Pre-Admission Testing from 10/25/2024 in [facility]   Do you snore loudly? 0 filed at 12/03/2024 1040 0 filed at 10/25/2024 0959   Do you often feel tired or fatigued after your sleep? 1 filed at 12/03/2024 1040 1 filed at 10/25/2024 0959   Has anyone ever observed you stop breathing in your sleep? 1 filed at 12/03/2024 1040 1 filed at 10/25/2024 0959   Do you have or are you being treated for high blood pressure? 1 filed at 12/03/2024 1040 1 filed at 10/25/2024 0959   Recent BMI (Calculated) 45.1 filed at 12/03/2024 1040 45.4 filed at 10/25/2024 0959   Is BMI greater than 35 kg/m2? 1=Yes filed at 12/03/2024 1040 1=Yes filed  at 10/25/2024 0959   Age older than 50 years old? 1=Yes filed at 12/03/2024 1040 1=Yes filed at 10/25/2024 0959   Is your neck circumference greater than 17 inches (Male) or 16 inches (Female)? 1 filed at 12/03/2024 1040 1 filed at 10/25/2024 0959   Gender - Male 1=Yes filed at 12/03/2024 1040 1=Yes filed at 10/25/2024 0959   STOP-BANG Total Score 7 filed at 12/03/2024 1040 7 filed at 10/25/2024 0959          Prodigy: High Risk  Total Score: 16              Prodigy Age Score      Prodigy Gender Score          ARISCAT Score for Postoperative Pulmonary Complications      Flowsheet Row Pre-Admission Testing from 12/3/2024 in [facility]   Age, years  3 filed at 12/03/2024 1117   Preoperative SpO2 0 filed at 12/03/2024 1117   Respiratory infection in the last month Either upper or lower (i.e., URI, bronchitis, pneumonia), with fever and antibiotic treatment 0 filed at 12/03/2024 1117   Preoperative anemoa (Hgb less than 10 g/dl) 0 filed at 12/03/2024 1117   Surgical incision  0 filed at 12/03/2024 1117   Duration of surgery  0 filed at 12/03/2024 1117          [place] Perioperative Risk for Myocardial Infarction or Cardiac Arrest (DHRUV)      Flowsheet Row Pre-Admission Testing from 12/3/2024 in [facility]   Age 1.32 filed at 12/03/2024 1117   Functional Status  0 filed at 12/03/2024 1117   ASA Class  -1.92 filed at 12/03/2024 1117   Creatinine 0 filed at 12/03/2024 1117   Type of Procedure  -0.26 filed at 12/03/2024 1117   DHRUV Total Score  -6.11 filed at 12/03/2024 1117   DHRUV % 0.22 filed at 12/03/2024 1117          Assessment and Plan   63 yo male presents to [facility] for risk assessment and preoperative optimization in advance of his prostate surgery.    Today his BP is stable and he is afebrile. Denies gross hematuria.     Neuro:  No diagnosis or significant findings on chart review or clinical presentation and evaluation.      the patient is at an increased risk for post operative  delirium secondary to age >/= 65.  Preoperative brain exercise  discussed with patient.     The patient is at an increased risk for perioperative stroke secondary       HEENT/Airway:  No diagnosis or significant findings on chart review or clinical presentation and evaluation.     Cardiovascular:  Atrial fib- underwent an ablation on 9/12/2024 with Dr Bajwa.  Continue Metoprolol as prescribed  Continue flecainide as prescribed  Continue Eliquis Last dose 12/13/24    HPL-last lipid panel reviewed  Continue Crestor 20 mg as prescribed  Lab Results   Component Value Date    CHOL 89 06/17/2024    CHOL 87 09/08/2023    CHOL 172 08/22/2022     Lab Results   Component Value Date    HDL 30.3 06/17/2024    HDL 28.9 (A) 09/08/2023    HDL 28.9 (A) 08/22/2022     Lab Results   Component Value Date    LDLCALC 21 06/17/2024     Lab Results   Component Value Date    TRIG 187 (H) 06/17/2024    TRIG 115 09/08/2023    TRIG 243 (H) 08/22/2022     HTN- BP is well controlled today  Continue Losartan 50 mg daily, last dose 24 hr prior to surgery    Patient of Dr Hernandez and Zunilda REILLY, last appt.  after his RFA for Afib, per her office note:  In summary Mr. Hogue is a pleasant 64-year-old white male with a past medical history significant for persistent atrial fibrillation, hypertension, hyperlipidemia with a CT calcium score of 0 in 2021, type II non-insulin-requiring diabetes, morbid obesity, and mildly dilated ascending aorta. He has noted fatigue and decreased energy levels since having his ablation and starting on Flecainide. He has follow up with Dr. Bajwa in December at which time he will discuss Flecainide. Given his dyspnea on exertion and fatigue I did order an echocardiogram for surveillance of LV function. I suspect dyspnea on exertion is multifactorial secondary to deconditioning and obesity. He is euvolemic by clinical exam. His blood pressure is at goal today. I have encouraged him to modify diet, increase physical  activity, and lose weight. He will follow up with sleep medicine as his fatigue may be related to sleep apnea as he does not wear CPAP all night. He also has noted weakness in his thighs when ambulating. I suspect his may be due to deconditioning, but I did decrease Rosuvastatin to see if this improved symptoms as thigh discomfort could be related. He will let me know in a few weeks if discomfort is better. He is scheduled to have prostate biopsy on  which is more than 3 months out from his ablation. He is ok to hold Eliquis for 2 days prior to procedure as instructed and resume when safe to do as instructed by performing physician. He will continue current cardiovascular medications. He will follow up in 2 months. He will benefit from CT of chest without contrast in 2025 for surveillance of aortic aneurysm.      Aortic aneurysm  7/3/2024 CT chest showed Mild aneurysmal dilatation of the ascending thoracic aorta measuring up to 4.1 cm in diameter.    Last Cardiology Tests:  EC23 Obtained and reviewed EKG- normal sinus rhythm HR 64, with first degree AV block, low voltage QRS     Holter monitor 2023  27% atrial fibrillation/flutter burden with the longest episode lasting 12 hours and 53 minutes.  Maximum heart rate 153 bpm and average heart rate 70 bpm minimum heart rate 41 bpm.     Echo:  Echocardiogram 2023  Normal LV function with an ejection fraction of 60 to 65%  Trace to mild aortic regurgitation  Mild mitral regurgitation  No evidence of LVH with a septal measurement of 1.0 cm and a posterior wall measurement of 1.0 cm  Normal diastolic filling  The left atrium was normal in size  Mild to moderate tricuspid regurgitation with an estimated right ventricular systolic pressure of 26 mmHg      Stress Test:  Exercise nuclear stress test 2023  The patient exercised for 7 minutes on a standard Amadou protocol achieving 7 METS.  Peak heart rate was 125 bpm which was  80% of his maximum predicted heart rate.  Peak blood pressure was 180/86.  Nonischemic EKG at 80% maximum predicted heart rate.  SPECT images were normal with diaphragmatic attenuation artifact.  Ejection fraction 60%     Cardiac Imaging:  CT cardiac scoring March 5, 2021  FINDINGS:  The score and distribution of calcium in the coronary arteries is as  follows:     LM            0  LAD           0  LCx           0  RCA           0     Total          0     Pulmonary:  FLOYD- he had to return cpap as it was not used enough to meet criteria  Per Sleep medicine:  Patient has the following sleep-related diagnoses: 7/12/2024 Moderate FLOYD The REI3% was 22.7 events per hour. The REI4% was 13.9 events per hour. MIGUELANGEL was 0.3 event per hour. The SpO2 martir was 74%.   He was setup with CPAP per [facility] in August 2024.   Pulmonary nodule RUL stable on 7/3/24 CT scan    PRODIGY: High risk for opioid induced respiratory depression  Discussed smoking cessation and deep breathing handout given    Renal:   No diagnosis or significant findings on chart review, or clinical presentation and evaluation.     Pt at Moderate risk for perioperative PATRICIA based on Dynamic Predictive Scoring Tool for Perioperative PATRICIA  Lab Results   Component Value Date    GLUCOSE 227 (H) 10/25/2024    CALCIUM 8.6 10/25/2024     10/25/2024    K 4.7 10/25/2024    CO2 30 10/25/2024     10/25/2024    BUN 20 10/25/2024    CREATININE 1.17 10/25/2024       Endocrine:  No diagnosis or significant findings on chart review or clinical presentation and evaluation.     Lab Results   Component Value Date    HGBA1C 7.3 (H) 10/25/2024       Hematologic:  No diagnosis or significant findings on chart review or clinical presentation and evaluation.  Lab Results   Component Value Date    WBC 8.4 10/25/2024    HGB 13.2 (L) 10/25/2024    HCT 40.6 (L) 10/25/2024    MCV 86 10/25/2024     10/25/2024       Pt supplied education/VTE handout    Gastrointestinal:   No  diagnosis or significant findings on chart review or clinical presentation and evaluation.   EAT-10 score of 0 - self-perceived oropharyngeal dysphagia scale (0-40)      :  See HPI  Continue Flomax as prescribed    Infectious disease:   No diagnosis or significant findings on chart review or clinical presentation and evaluation.     Musculoskeletal:   No diagnosis or significant findings on chart review or clinical presentation and evaluation.     Preoperative risk assessment  ASA III  NSQIP - Predicted length of stay days 0  PAT Testing - UA  10/25 coag, cbc, bmp reviewd    Anesthesia:  No prior anesthesia    denies dental issues  Smoker-denies  Drugs-denies  ETOH-rare  denies personal/family issues with Anesthesia    ADALBERTO Zapata 12/3/2024 11:35 AM

## 2024-12-03 ENCOUNTER — PRE-ADMISSION TESTING (OUTPATIENT)
Dept: PREADMISSION TESTING | Facility: HOSPITAL | Age: 64
End: 2024-12-03
Payer: COMMERCIAL

## 2024-12-03 ENCOUNTER — APPOINTMENT (OUTPATIENT)
Dept: PHARMACY | Facility: HOSPITAL | Age: 64
End: 2024-12-03
Payer: COMMERCIAL

## 2024-12-03 VITALS
SYSTOLIC BLOOD PRESSURE: 138 MMHG | TEMPERATURE: 96.1 F | HEART RATE: 58 BPM | DIASTOLIC BLOOD PRESSURE: 82 MMHG | OXYGEN SATURATION: 97 % | RESPIRATION RATE: 16 BRPM | WEIGHT: 315 LBS | BODY MASS INDEX: 39.17 KG/M2 | HEIGHT: 75 IN

## 2024-12-03 DIAGNOSIS — Z01.818 PREOP TESTING: Primary | ICD-10-CM

## 2024-12-03 LAB
APPEARANCE UR: CLEAR
BILIRUB UR STRIP.AUTO-MCNC: NEGATIVE MG/DL
COLOR UR: YELLOW
GLUCOSE UR STRIP.AUTO-MCNC: NEGATIVE MG/DL
HOLD SPECIMEN: NORMAL
KETONES UR STRIP.AUTO-MCNC: NEGATIVE MG/DL
LEUKOCYTE ESTERASE UR QL STRIP.AUTO: NEGATIVE
NITRITE UR QL STRIP.AUTO: NEGATIVE
PH UR STRIP.AUTO: 6.5 [PH]
PROT UR STRIP.AUTO-MCNC: NEGATIVE MG/DL
RBC # UR STRIP.AUTO: NEGATIVE /UL
SP GR UR STRIP.AUTO: 1.02
UROBILINOGEN UR STRIP.AUTO-MCNC: 2 MG/DL

## 2024-12-03 PROCEDURE — 81003 URINALYSIS AUTO W/O SCOPE: CPT

## 2024-12-03 ASSESSMENT — ENCOUNTER SYMPTOMS
GASTROINTESTINAL NEGATIVE: 1
DIFFICULTY URINATING: 1
CONSTITUTIONAL NEGATIVE: 1
NEUROLOGICAL NEGATIVE: 1
NECK NEGATIVE: 1
RESPIRATORY NEGATIVE: 1
DYSPNEA WITH EXERTION: 1
EYES NEGATIVE: 1
DYSURIA: 1
MUSCULOSKELETAL NEGATIVE: 1
ENDOCRINE NEGATIVE: 1

## 2024-12-03 ASSESSMENT — DUKE ACTIVITY SCORE INDEX (DASI)
CAN YOU DO MODERATE WORK AROUND THE HOUSE LIKE VACUUMING, SWEEPING FLOORS OR CARRYING GROCERIES: YES
CAN YOU WALK INDOORS, SUCH AS AROUND YOUR HOUSE: YES
DASI METS SCORE: 9
CAN YOU DO LIGHT WORK AROUND THE HOUSE LIKE DUSTING OR WASHING DISHES: YES
CAN YOU WALK A BLOCK OR TWO ON LEVEL GROUND: YES
TOTAL_SCORE: 50.7
CAN YOU DO HEAVY WORK AROUND THE HOUSE LIKE SCRUBBING FLOORS OR LIFTING AND MOVING HEAVY FURNITURE: YES
CAN YOU RUN A SHORT DISTANCE: YES
CAN YOU PARTICIPATE IN STRENOUS SPORTS LIKE SWIMMING, SINGLES TENNIS, FOOTBALL, BASKETBALL, OR SKIING: NO
CAN YOU TAKE CARE OF YOURSELF (EAT, DRESS, BATHE, OR USE TOILET): YES
CAN YOU CLIMB A FLIGHT OF STAIRS OR WALK UP A HILL: YES
CAN YOU DO YARD WORK LIKE RAKING LEAVES, WEEDING OR PUSHING A MOWER: YES
CAN YOU HAVE SEXUAL RELATIONS: YES
CAN YOU PARTICIPATE IN MODERATE RECREATIONAL ACTIVITIES LIKE GOLF, BOWLING, DANCING, DOUBLES TENNIS OR THROWING A BASEBALL OR FOOTBALL: YES

## 2024-12-03 ASSESSMENT — LIFESTYLE VARIABLES: SMOKING_STATUS: NONSMOKER

## 2024-12-03 ASSESSMENT — PAIN - FUNCTIONAL ASSESSMENT: PAIN_FUNCTIONAL_ASSESSMENT: 0-10

## 2024-12-03 ASSESSMENT — PAIN SCALES - GENERAL: PAINLEVEL_OUTOF10: 0 - NO PAIN

## 2024-12-03 NOTE — PREPROCEDURE INSTRUCTIONS
Medication List            Accurate as of December 3, 2024 10:54 AM. Always use your most recent med list.                apixaban 5 mg tablet  Commonly known as: Eliquis  Take 1 tablet (5 mg) by mouth 2 times a day.  Medication Adjustments for Surgery: Take last dose 3 days before surgery     flecainide 150 mg tablet  Commonly known as: Tambocor  Take 1 tablet (150 mg) by mouth 2 times a day.  Medication Adjustments for Surgery: Take/Use as prescribed     fluticasone 50 mcg/actuation nasal spray  Commonly known as: Flonase  Administer 1 spray into each nostril once daily. Shake gently. Before first use, prime pump. After use, clean tip and replace cap.  Medication Adjustments for Surgery: Take/Use as prescribed     GaviLyte-G 236-22.74-6.74 -5.86 gram solution  Generic drug: polyethylene glycol  Medication Adjustments for Surgery: Do Not take on the morning of surgery     losartan 50 mg tablet  Commonly known as: Cozaar  Take 1 tablet (50 mg) by mouth once daily.  Medication Adjustments for Surgery: Take last dose 1 day (24 hours) before surgery     metFORMIN  mg 24 hr tablet  Commonly known as: Glucophage-XR  Take 1 tablet (500 mg) by mouth 2 times daily (morning and late afternoon).  Medication Adjustments for Surgery: Do Not take on the morning of surgery     metoprolol tartrate 25 mg tablet  Commonly known as: Lopressor  Take 1 tablet (25 mg) by mouth 2 times a day.  Medication Adjustments for Surgery: Take/Use as prescribed     polyethylene glycol-electrolytes 420 gram solution  Commonly known as: Nulytely  Medication Adjustments for Surgery: Do Not take on the morning of surgery     rosuvastatin 20 mg tablet  Commonly known as: Crestor  Take 0.5 tablets (10 mg) by mouth once daily.  Medication Adjustments for Surgery: Take/Use as prescribed     tamsulosin 0.4 mg 24 hr capsule  Commonly known as: Flomax  Take 1 capsule (0.4 mg) by mouth once daily.  Medication Adjustments for Surgery: Take/Use as  prescribed            If no issues with your liver you may take Tylenol 2-500 mg tablets every 8 hrs around the clock for pain including morning of surgery with a sip of water    STOP VITAMINS, SUPPLEMENTS, HERBS, PROBIOTICS, AND FISH OIL, NO MOTRIN OR ADVIL OR ALEVE 7 DAYS BEFORE SURGERY    IF YOU HAVE A CPAP MACHINE BRING ON DAY OF SURGERY     CONTACT SURGEON'S OFFICE IF YOU DEVELOP:  * Fever = 100.4 F   * New respiratory symptoms (e.g. cough, shortness of breath, respiratory distress, sore throat)  * Recent loss of taste or smell  *Flu like symptoms such as headache, fatigue or gastrointestinal symptoms  * You develop any open sores, shingles, burning or painful urination   AND/OR:  * You no longer wish to have the surgery.  * Any other personal circumstances change that may lead to the need to cancel or defer this surgery.  *You were admitted to any hospital within one week of your planned procedure.     SMOKING:  *Quitting smoking can make a huge difference to your health and recovery from surgery.    *If you need help with quitting, call 6-290-QUIT-NOW.     Additional Instructions:      Seven/Six Days before Surgery:  Review your medication instructions, stop indicated medications  Five Days before Surgery:  Review your medication instructions, stop indicated medications  Begin using your Hibiclens, if prescribed  Three Days before Surgery:  Review your medication instructions, stop indicated medications  The Day before Surgery:  Start using 0.12% CHG mouthwash-if prescribed  No smoking or alcohol use 24 hours before surgery  Review your medication instructions, stop indicated medications  You will be contacted regarding the time of your arrival to facility and surgery time  Do not eat any food after Midnight  Day of Surgery:  Review your medication instructions, take indicated medications  If you have diabetes, please check your fasting blood sugar upon awakening.  If fasting blood sugar is <80 mg/dl, drink  100 ml of apple juice, time limit of 2 hours before     SURGICAL TIME:  *You will be contacted between 2 p.m. and 3 p.m. the business day before your surgery with your arrival time.  *If you haven't received a call by 3pm, call [phone]  *Scheduled surgery times may change and you will be notified if this occurs-check your personal voicemail for any updates.     ON THE MORNING OF SURGERY:  *Wear comfortable, loose fitting clothing.   *Do not use moisturizers, creams, lotions or perfume.  *All jewelry and valuables should be left at home.  *Prosthetic devices such as contact lenses, hearing aids, dentures, eyelash extensions, hairpins and body piercing must be removed before surgery.     BRING WITH YOU:  *Photo ID and insurance card  *Current list of medications and allergies  *Pacemaker/Defibrillator/Heart stent cards  *CPAP machine and mask  *Slings/splints/crutches  *Copy of your complete Advanced Directive/DHPOA-if applicable  *Neurostimulator implant remote     PARKING AND ARRIVAL:  *Check in at the Main Entrance desk and let them know you are here for surgery.     IF YOU ARE HAVING OUTPATIENT/SAME DAY SURGERY:  *A responsible adult MUST accompany you at the time of discharge and stay with you for 24 hours after your surgery.  *You may NOT drive yourself home after surgery.  *You may use a taxi or ride sharing service ([employer], [employer]) to return home ONLY if you are accompanied by a friend or family member.  *Instructions for resuming your medications will be provided by your surgeon.    Preoperative Fasting Guidelines     When do I need to stop eating before my surgery?  Do not eat any food after midnight the night before your surgery/procedure.    You may have up to 12 ounces of clear liquid until TWO hours before your instructed arrival time to the hospital.  This includes water, black tea/coffee, (no milk or cream) apple juice, and electrolyte drinks (Gatorade)  You may chew gum until TWO hours before your  surgery/procedure    Preoperative Brain Exercises     What are brain exercises?  A brain exercise is any activity that engages your thinking (cognitive) skills.     What types of activities are considered brain exercises?  Jigsaw puzzles, crossword puzzles, word jumble, memory games, word search, and many more.  Many can be found free online or on your phone via a mobile fatou.     Why should I do brain exercises before my surgery?  More recent research has shown brain exercise before surgery can lower the risk of postoperative delirium (confusion) which can be especially important for older adults.  Patients who did brain exercises for 5 to 10 hours the days before surgery, cut their risk of postoperative delirium in half up to 1 week after surgery.                 [facility]   Preoperative Deep Breathing Exercises     Why it is important to do deep breathing exercises before my surgery?  Deep breathing exercises strengthen your breathing muscles.  This helps you to recover after your surgery and decreases the chance of breathing complications.        How are the deep breathing exercises done?  Sit straight with your back supported.  Breathe in deeply and slowly through your nose. Your lower rib cage should expand and your abdomen may move forward.  Hold that breath for 3 to 5 seconds.  Breathe out through pursed lips, slowly and completely.  Rest and repeat 10 times every hour while awake.  Rest longer if you become dizzy or lightheaded.                [facility]   Preoperative Deep Breathing Exercises     Why it is important to do deep breathing exercises before my surgery?  Deep breathing exercises strengthen your breathing muscles.  This helps you to recover after your surgery and decreases the chance of breathing complications.        How are the deep breathing exercises done?  Sit straight with your back supported.  Breathe in deeply and slowly through your  nose. Your lower rib cage should expand and your abdomen may move forward.  Hold that breath for 3 to 5 seconds.  Breathe out through pursed lips, slowly and completely.  Rest and repeat 10 times every hour while awake.  Rest longer if you become dizzy or lightheaded.        Patient Information: Incentive Spirometer  What is an incentive spirometer?  An incentive spirometer is a device used before and after surgery to “exercise” your lungs.  It helps you to take deeper breaths to expand your lungs.  Below is an example of a basic incentive spirometer.  The device you receive may differ slightly but they all function the same.    Why do I need to use an incentive spirometer?  Using your incentive spirometer prepares your lungs for surgery and helps prevent lung problems after surgery.  How do I use my incentive spirometer?  When you're using your incentive spirometer, make sure to breathe through your mouth. If you breathe through your nose, the incentive spirometer won't work properly. You can hold your nose if you have trouble.  If you feel dizzy at any time, stop and rest. Try again at a later time.  Follow the steps below:  Set up your incentive spirometer, expand the flexible tubing and connect to the outlet.  Sit upright in a chair or bed. Hold the incentive spirometer at eye level.   Put the mouthpiece in your mouth and close your lips tightly around it. Slowly breathe out (exhale) completely.  Breathe in (inhale) slowly through your mouth as deeply as you can. As you take a breath, you will see the piston rise inside the large column. While the piston rises, the indicator should move upwards. It should stay in between the 2 arrows (see Figure).  Try to get the piston as high as you can, while keeping the indicator between the arrows.   If the indicator doesn't stay between the arrows, you're breathing either too fast or too slow.  When you get it as high as you can, hold your breath for 10 seconds, or as long  as possible. While you're holding your breath, the piston will slowly fall to the base of the spirometer.  Once the piston reaches the bottom of the spirometer, breathe out slowly through your mouth. Rest for a few seconds.  Repeat 10 times. Try to get the piston to the same level with each breath.  Repeat every hour while awake  You can carefully clean the outside of the mouthpiece with an alcohol wipe or soap and water.       Patient and Family Education        Ways You Can Help Prevent Blood Clots                 This handout explains some simple things you can do to help prevent blood clots.      Blood clots are blockages that can form in the body's veins. When a blood clot forms in your deep veins, it may be called a deep vein thrombosis, or DVT for short. Blood clots can happen in any part of the body where blood flows, but they are most common in the arms and legs. If a piece of a blood clot breaks free and travels to the lungs, it is called a pulmonary embolus (PE). A PE can be a very serious problem.       Being in the hospital or having surgery can raise your chances of getting a blood clot because you may not be well enough to move around as much as you normally do.         Ways you can help prevent blood clots in the hospital           Wearing SCDs. SCDs stands for Sequential Compression Devices.   SCDs are special sleeves that wrap around your legs  They attach to a pump that fills them with air to gently squeeze your legs every few minutes.   This helps return the blood in your legs to your heart.   SCDs should only be taken off when walking or bathing.   SCDs may not be comfortable, but they can help save your life.                                            Wearing compression stockings - if your doctor orders them. These special snug fitting stockings gently squeeze your legs to help blood flow.       Walking. Walking helps move the blood in your legs.   If your doctor says it is ok, try walking the  halls at least   5 times a day. Ask us to help you get up, so you don't fall.      Taking any blood thinning medicines your doctor orders.        Page 1 of 2            [facility]; 3/23   Ways you can help prevent blood clots at home         Wearing compression stockings - if your doctor orders them. ? Walking - to help move the blood in your legs.       Taking any blood thinning medicines your doctor orders.      Signs of a blood clot or PE        Tell your doctor or nurse know right away if you have of the problems listed below.    If you are at home, seek medical care right away. Call 911 for chest pain or problems breathing.                Signs of a blood clot (DVT) - such as pain,  swelling, redness or warmth in your arm or leg      Signs of a pulmonary embolism (PE) - such as chest pain or feeling short of breath       Thank you for coming to Pre Admission testing.      If I have prescribe medication please don't forget to  at your pharmacy.      Any questions about today's visit call [phone] and leave a message in the general mailbox.     Patient instructed to ambulate as soon as possible postoperatively to decrease thromboembolic risk.     Jazmin Fierro, APRN-CNP     Thank you for visiting [facility].  If you have any changes to your health condition, please call the surgeons office to alert them and give them details of your symptoms.

## 2024-12-10 ENCOUNTER — APPOINTMENT (OUTPATIENT)
Dept: CARDIOLOGY | Facility: CLINIC | Age: 64
End: 2024-12-10
Payer: COMMERCIAL

## 2024-12-10 ENCOUNTER — HOSPITAL ENCOUNTER (OUTPATIENT)
Dept: CARDIOLOGY | Facility: CLINIC | Age: 64
Discharge: HOME | End: 2024-12-10
Payer: COMMERCIAL

## 2024-12-10 VITALS
WEIGHT: 315 LBS | BODY MASS INDEX: 45 KG/M2 | OXYGEN SATURATION: 95 % | DIASTOLIC BLOOD PRESSURE: 70 MMHG | HEART RATE: 63 BPM | SYSTOLIC BLOOD PRESSURE: 108 MMHG

## 2024-12-10 DIAGNOSIS — R06.00 DYSPNEA, UNSPECIFIED TYPE: Primary | ICD-10-CM

## 2024-12-10 DIAGNOSIS — R06.09 DYSPNEA ON EXERTION: ICD-10-CM

## 2024-12-10 DIAGNOSIS — I48.0 PAROXYSMAL ATRIAL FIBRILLATION (MULTI): ICD-10-CM

## 2024-12-10 PROCEDURE — 99214 OFFICE O/P EST MOD 30 MIN: CPT | Mod: 25 | Performed by: INTERNAL MEDICINE

## 2024-12-10 PROCEDURE — 4010F ACE/ARB THERAPY RXD/TAKEN: CPT | Performed by: INTERNAL MEDICINE

## 2024-12-10 PROCEDURE — 3078F DIAST BP <80 MM HG: CPT | Performed by: INTERNAL MEDICINE

## 2024-12-10 PROCEDURE — 1036F TOBACCO NON-USER: CPT | Performed by: INTERNAL MEDICINE

## 2024-12-10 PROCEDURE — 93005 ELECTROCARDIOGRAM TRACING: CPT | Performed by: INTERNAL MEDICINE

## 2024-12-10 PROCEDURE — 3061F NEG MICROALBUMINURIA REV: CPT | Performed by: INTERNAL MEDICINE

## 2024-12-10 PROCEDURE — 3048F LDL-C <100 MG/DL: CPT | Performed by: INTERNAL MEDICINE

## 2024-12-10 PROCEDURE — 3074F SYST BP LT 130 MM HG: CPT | Performed by: INTERNAL MEDICINE

## 2024-12-10 PROCEDURE — C8929 TTE W OR WO FOL WCON,DOPPLER: HCPCS

## 2024-12-10 PROCEDURE — 99214 OFFICE O/P EST MOD 30 MIN: CPT | Performed by: INTERNAL MEDICINE

## 2024-12-10 PROCEDURE — 1159F MED LIST DOCD IN RCRD: CPT | Performed by: INTERNAL MEDICINE

## 2024-12-10 PROCEDURE — 93010 ELECTROCARDIOGRAM REPORT: CPT | Performed by: INTERNAL MEDICINE

## 2024-12-10 PROCEDURE — 2500000004 HC RX 250 GENERAL PHARMACY W/ HCPCS (ALT 636 FOR OP/ED): Performed by: STUDENT IN AN ORGANIZED HEALTH CARE EDUCATION/TRAINING PROGRAM

## 2024-12-10 PROCEDURE — 93306 TTE W/DOPPLER COMPLETE: CPT | Performed by: INTERNAL MEDICINE

## 2024-12-10 PROCEDURE — 1126F AMNT PAIN NOTED NONE PRSNT: CPT | Performed by: INTERNAL MEDICINE

## 2024-12-10 PROCEDURE — 3051F HG A1C>EQUAL 7.0%<8.0%: CPT | Performed by: INTERNAL MEDICINE

## 2024-12-10 ASSESSMENT — COLUMBIA-SUICIDE SEVERITY RATING SCALE - C-SSRS
1. IN THE PAST MONTH, HAVE YOU WISHED YOU WERE DEAD OR WISHED YOU COULD GO TO SLEEP AND NOT WAKE UP?: NO
2. HAVE YOU ACTUALLY HAD ANY THOUGHTS OF KILLING YOURSELF?: NO
6. HAVE YOU EVER DONE ANYTHING, STARTED TO DO ANYTHING, OR PREPARED TO DO ANYTHING TO END YOUR LIFE?: NO

## 2024-12-10 ASSESSMENT — ENCOUNTER SYMPTOMS
DEPRESSION: 0
LOSS OF SENSATION IN FEET: 0
OCCASIONAL FEELINGS OF UNSTEADINESS: 0

## 2024-12-10 ASSESSMENT — PAIN SCALES - GENERAL: PAINLEVEL_OUTOF10: 0-NO PAIN

## 2024-12-10 NOTE — PROGRESS NOTES
Referred by Dr. Bahena ref. provider found provider found for No chief complaint on file.       Eligio Hogue is a 64 y.o. year old male patient with h/o A Fib s/p RFA 3 months ago. Presents for follow up.     PMHx/PSHx: As above    FamHx: unremarkable     Allergies:  Allergies   Allergen Reactions    Penicillins Other     Blisters in mouth      Omeprazole Rash        Review of Systems    Constitutional: not feeling tired.   Eyes: no eyesight problems.   ENT: no hearing loss and no nosebleeds.   Cardiovascular: no intermittent leg claudication and as noted in HPI.   Respiratory: no chronic cough and no shortness of breath.   Gastrointestinal: no change in bowel habits and no blood in stools.   Genitourinary: no urinary frequency and no hematuria.   Skin: no skin rashes.   Neurological: no seizures and no frequent falls.   Psychiatric: no depression and not suicidal.   All other systems have been reviewed and are negative for complaint.     Outpatient Medications:  Current Outpatient Medications   Medication Instructions    apixaban (ELIQUIS) 5 mg, oral, 2 times daily    flecainide (TAMBOCOR) 150 mg, oral, 2 times daily    fluticasone (Flonase) 50 mcg/actuation nasal spray 1 spray, Each Nostril, Daily, Shake gently. Before first use, prime pump. After use, clean tip and replace cap.    GaviLyte-G 236-22.74-6.74 -5.86 gram solution This medication is for patient's upcoming colonoscopy in January 2025.    losartan (COZAAR) 50 mg, oral, Daily    metFORMIN XR (GLUCOPHAGE-XR) 500 mg, oral, 2 times daily (morning and late afternoon)    metoprolol tartrate (LOPRESSOR) 25 mg, oral, 2 times daily    polyethylene glycol-electrolytes (Nulytely) 420 gram solution 4,000 mL, Once    rosuvastatin (CRESTOR) 10 mg, oral, Daily    tamsulosin (FLOMAX) 0.4 mg, oral, Daily         Last Recorded Vitals:      9/16/2024     2:43 PM 9/20/2024    10:57 AM 9/26/2024     4:04 PM 10/22/2024     1:40 PM 10/25/2024     9:56 AM 11/21/2024     9:25 AM  "12/3/2024    10:37 AM   Vitals   Systolic 142  143 154 140 118 138   Diastolic 76  79 85 75 60 82   BP Location   Left arm Left arm  Left arm    Heart Rate   66 62 60 64 58   Temp     36 °C (96.8 °F)  35.6 °C (96.1 °F)   Resp     18  16   Height   1.905 m (6' 3\") 1.905 m (6' 3\") 1.905 m (6' 3\") 1.905 m (6' 3\") 1.905 m (6' 3\")   Weight (lb)  359.35 364.5 362.4 363.54 358 361.11   BMI  44.92 kg/m2 45.56 kg/m2 45.3 kg/m2 45.44 kg/m2 44.75 kg/m2 45.14 kg/m2   BSA (m2)  2.94 m2 2.95 m2 2.95 m2 2.95 m2 2.93 m2 2.95 m2   Visit Report Report  Report Report  Report     Visit Vitals  Smoking Status Former        Physical Exam:  Constitutional: alert and in no acute distress.   Eyes: no erythema, swelling or discharge from the eye .   Neck: neck is supple, symmetric, trachea midline, no masses  and no thyromegaly .   Pulmonary: no increased work of breathing or signs of respiratory distress  and lungs clear to auscultation.    Cardiovascular: carotid pulses 2+ bilaterally with no bruit , JVP was normal, no thrills , regular rhythm, normal S1 and S2, no murmurs , pedal pulses 2+ bilaterally  and no edema .   Abdomen: abdomen non-tender, no masses  and no hepatomegaly .   Skin: skin warm and dry, normal skin turgor .   Psychiatric judgment and insight is normal  and oriented to person, place and time .        Assessment/Plan   Problem List Items Addressed This Visit    None      Eligio Hogue is a 64 y.o. year old male patient with h/o A Fib s/p RFA 3 months ago. Presents for follow up.   The patient reports no palpitations or chest pain, although gets tired and SOB with physical activity. His current ECG shows NSR with narrow QRS and HR of 61 bpm. He just got an echocardiogram today although no read yet. Will stop the flecainide and continue the rest of medications and follow up in 3 months (6 months from ablation.         Nickolas Bajwa MD  Cardiac Electrophysiology      Thank you very much for allowing me to participate in the " care of this pleasant patient. Please do not hesitate to contact me with any further questions or concerns regarding his care.    **Disclaimer: This note was dictated by speech recognition, and every effort has been made to prevent any error in transcription, however minor errors may be present**

## 2024-12-11 LAB
AORTIC VALVE MEAN GRADIENT: 5 MMHG
AORTIC VALVE PEAK VELOCITY: 1.47 M/S
AV PEAK GRADIENT: 9 MMHG
AVA (PEAK VEL): 2.85 CM2
AVA (VTI): 3.27 CM2
EJECTION FRACTION APICAL 4 CHAMBER: 72.1
EJECTION FRACTION: 60 %
LEFT ATRIUM VOLUME AREA LENGTH INDEX BSA: 25.3 ML/M2
LEFT VENTRICULAR OUTFLOW TRACT DIAMETER: 2.15 CM
MITRAL VALVE E/A RATIO: 1.37
RIGHT VENTRICLE FREE WALL PEAK S': 16.89 CM/S
RIGHT VENTRICLE PEAK SYSTOLIC PRESSURE: 40.7 MMHG
TRICUSPID ANNULAR PLANE SYSTOLIC EXCURSION: 3.2 CM

## 2024-12-12 LAB
ATRIAL RATE: 61 BPM
P AXIS: 42 DEGREES
P OFFSET: 164 MS
P ONSET: 90 MS
PR INTERVAL: 256 MS
Q ONSET: 218 MS
QRS COUNT: 10 BEATS
QRS DURATION: 100 MS
QT INTERVAL: 416 MS
QTC CALCULATION(BAZETT): 418 MS
QTC FREDERICIA: 418 MS
R AXIS: -27 DEGREES
T AXIS: 35 DEGREES
T OFFSET: 426 MS
VENTRICULAR RATE: 61 BPM

## 2024-12-16 ENCOUNTER — APPOINTMENT (OUTPATIENT)
Dept: PRIMARY CARE | Facility: CLINIC | Age: 64
End: 2024-12-16
Payer: COMMERCIAL

## 2024-12-16 VITALS — SYSTOLIC BLOOD PRESSURE: 130 MMHG | DIASTOLIC BLOOD PRESSURE: 78 MMHG

## 2024-12-16 DIAGNOSIS — E11.69 TYPE 2 DIABETES MELLITUS WITH OTHER SPECIFIED COMPLICATION, WITHOUT LONG-TERM CURRENT USE OF INSULIN: ICD-10-CM

## 2024-12-16 DIAGNOSIS — E78.2 MIXED HYPERLIPIDEMIA: ICD-10-CM

## 2024-12-16 DIAGNOSIS — G47.33 OSA ON CPAP: ICD-10-CM

## 2024-12-16 DIAGNOSIS — R79.89 ELEVATED TSH: ICD-10-CM

## 2024-12-16 DIAGNOSIS — I48.0 PAROXYSMAL ATRIAL FIBRILLATION (MULTI): ICD-10-CM

## 2024-12-16 DIAGNOSIS — I10 PRIMARY HYPERTENSION: Primary | ICD-10-CM

## 2024-12-16 PROCEDURE — 3048F LDL-C <100 MG/DL: CPT | Performed by: STUDENT IN AN ORGANIZED HEALTH CARE EDUCATION/TRAINING PROGRAM

## 2024-12-16 PROCEDURE — 99214 OFFICE O/P EST MOD 30 MIN: CPT | Performed by: STUDENT IN AN ORGANIZED HEALTH CARE EDUCATION/TRAINING PROGRAM

## 2024-12-16 PROCEDURE — 3061F NEG MICROALBUMINURIA REV: CPT | Performed by: STUDENT IN AN ORGANIZED HEALTH CARE EDUCATION/TRAINING PROGRAM

## 2024-12-16 PROCEDURE — 4010F ACE/ARB THERAPY RXD/TAKEN: CPT | Performed by: STUDENT IN AN ORGANIZED HEALTH CARE EDUCATION/TRAINING PROGRAM

## 2024-12-16 PROCEDURE — 3051F HG A1C>EQUAL 7.0%<8.0%: CPT | Performed by: STUDENT IN AN ORGANIZED HEALTH CARE EDUCATION/TRAINING PROGRAM

## 2024-12-16 PROCEDURE — 3075F SYST BP GE 130 - 139MM HG: CPT | Performed by: STUDENT IN AN ORGANIZED HEALTH CARE EDUCATION/TRAINING PROGRAM

## 2024-12-16 PROCEDURE — 3078F DIAST BP <80 MM HG: CPT | Performed by: STUDENT IN AN ORGANIZED HEALTH CARE EDUCATION/TRAINING PROGRAM

## 2024-12-16 PROCEDURE — 1159F MED LIST DOCD IN RCRD: CPT | Performed by: STUDENT IN AN ORGANIZED HEALTH CARE EDUCATION/TRAINING PROGRAM

## 2024-12-16 NOTE — PROGRESS NOTES
Subjective   Patient ID: Eligio Hogue is a 64 y.o. male who presents for Follow-up.    HPI   Routine fu.    He feels generally well.    He is having a prostate biopsy tomorrow.    He is trying to work on weight loss.  Review of Systems  12-point ROS reviewed and was negative unless otherwise noted in HPI.    Objective   /78     Physical Exam  GEN: conversant, NAD  HEENT: PERRL, EOMI, MMM  NECK: supple, no carotid bruits appreciated b/l  CV: S1, S2, RRR  PULM: CTAB  ABD: soft, NT, obese  NEURO: no new gross focal deficits  EXT: no sig LE edema  PSYCH: appropriate affect    Assessment/Plan     #BPH: seeing urology, prostate biopsy scheduled for tomorrow.     #FLOYD: Following w Sleep Med.     #Afib: Following with Dr Hernandez for Cardiology, S/P Ablation for Afib 9/12/2024. C/b bleed s/p Protamine administration.     #fatigue/Severe obesity, class 3: Advised increased efforts at diet, exercise, weight loss.      #HTN: continue losartan     #HLD: continue statin     #DM2: advised increasing metformin dose, patient declines. He is considering GLP-1     #Elevated TSH: T4 within normal limits, will follow     #Health maintenance: colonoscopy 6 mo fu due 1/2025. He says that he has had AAA screening. Advised Shingrix, yearly flu shot. Pneumovax given 2023. Interval records, labs reviewed.     RTC 4 mo

## 2024-12-17 ENCOUNTER — ANESTHESIA (OUTPATIENT)
Dept: OPERATING ROOM | Facility: HOSPITAL | Age: 64
End: 2024-12-17
Payer: COMMERCIAL

## 2024-12-17 ENCOUNTER — HOSPITAL ENCOUNTER (OUTPATIENT)
Facility: HOSPITAL | Age: 64
Setting detail: OUTPATIENT SURGERY
Discharge: HOME | End: 2024-12-17
Attending: STUDENT IN AN ORGANIZED HEALTH CARE EDUCATION/TRAINING PROGRAM | Admitting: STUDENT IN AN ORGANIZED HEALTH CARE EDUCATION/TRAINING PROGRAM
Payer: COMMERCIAL

## 2024-12-17 ENCOUNTER — ANESTHESIA EVENT (OUTPATIENT)
Dept: OPERATING ROOM | Facility: HOSPITAL | Age: 64
End: 2024-12-17
Payer: COMMERCIAL

## 2024-12-17 VITALS
BODY MASS INDEX: 39.17 KG/M2 | DIASTOLIC BLOOD PRESSURE: 74 MMHG | WEIGHT: 315 LBS | HEART RATE: 62 BPM | OXYGEN SATURATION: 95 % | TEMPERATURE: 97.5 F | SYSTOLIC BLOOD PRESSURE: 131 MMHG | RESPIRATION RATE: 16 BRPM | HEIGHT: 75 IN

## 2024-12-17 DIAGNOSIS — R93.5 ABNORMAL MRI, PELVIS: ICD-10-CM

## 2024-12-17 DIAGNOSIS — R33.9 URINARY RETENTION: Primary | ICD-10-CM

## 2024-12-17 LAB — GLUCOSE BLD MANUAL STRIP-MCNC: 157 MG/DL (ref 74–99)

## 2024-12-17 PROCEDURE — 3700000001 HC GENERAL ANESTHESIA TIME - INITIAL BASE CHARGE: Performed by: STUDENT IN AN ORGANIZED HEALTH CARE EDUCATION/TRAINING PROGRAM

## 2024-12-17 PROCEDURE — 2500000004 HC RX 250 GENERAL PHARMACY W/ HCPCS (ALT 636 FOR OP/ED): Performed by: NURSE ANESTHETIST, CERTIFIED REGISTERED

## 2024-12-17 PROCEDURE — 2500000004 HC RX 250 GENERAL PHARMACY W/ HCPCS (ALT 636 FOR OP/ED): Performed by: STUDENT IN AN ORGANIZED HEALTH CARE EDUCATION/TRAINING PROGRAM

## 2024-12-17 PROCEDURE — 7100000010 HC PHASE TWO TIME - EACH INCREMENTAL 1 MINUTE: Performed by: STUDENT IN AN ORGANIZED HEALTH CARE EDUCATION/TRAINING PROGRAM

## 2024-12-17 PROCEDURE — 7100000009 HC PHASE TWO TIME - INITIAL BASE CHARGE: Performed by: STUDENT IN AN ORGANIZED HEALTH CARE EDUCATION/TRAINING PROGRAM

## 2024-12-17 PROCEDURE — 2500000004 HC RX 250 GENERAL PHARMACY W/ HCPCS (ALT 636 FOR OP/ED): Performed by: UROLOGY

## 2024-12-17 PROCEDURE — C1819 TISSUE LOCALIZATION-EXCISION: HCPCS | Performed by: STUDENT IN AN ORGANIZED HEALTH CARE EDUCATION/TRAINING PROGRAM

## 2024-12-17 PROCEDURE — A55706 PR BIOPSY OF PROSTATE,NEEDLE,TRANSPERINEAL: Performed by: NURSE ANESTHETIST, CERTIFIED REGISTERED

## 2024-12-17 PROCEDURE — 3700000002 HC GENERAL ANESTHESIA TIME - EACH INCREMENTAL 1 MINUTE: Performed by: STUDENT IN AN ORGANIZED HEALTH CARE EDUCATION/TRAINING PROGRAM

## 2024-12-17 PROCEDURE — 55700 PR PROSTATE NEEDLE BIOPSY ANY APPROACH: CPT | Performed by: STUDENT IN AN ORGANIZED HEALTH CARE EDUCATION/TRAINING PROGRAM

## 2024-12-17 PROCEDURE — 82947 ASSAY GLUCOSE BLOOD QUANT: CPT

## 2024-12-17 PROCEDURE — 2720000007 HC OR 272 NO HCPCS: Performed by: STUDENT IN AN ORGANIZED HEALTH CARE EDUCATION/TRAINING PROGRAM

## 2024-12-17 PROCEDURE — 76872 US TRANSRECTAL: CPT | Performed by: STUDENT IN AN ORGANIZED HEALTH CARE EDUCATION/TRAINING PROGRAM

## 2024-12-17 PROCEDURE — 7100000001 HC RECOVERY ROOM TIME - INITIAL BASE CHARGE: Performed by: STUDENT IN AN ORGANIZED HEALTH CARE EDUCATION/TRAINING PROGRAM

## 2024-12-17 PROCEDURE — A55706 PR BIOPSY OF PROSTATE,NEEDLE,TRANSPERINEAL: Performed by: ANESTHESIOLOGY

## 2024-12-17 PROCEDURE — 7100000002 HC RECOVERY ROOM TIME - EACH INCREMENTAL 1 MINUTE: Performed by: STUDENT IN AN ORGANIZED HEALTH CARE EDUCATION/TRAINING PROGRAM

## 2024-12-17 PROCEDURE — 3600000004 HC OR TIME - INITIAL BASE CHARGE - PROCEDURE LEVEL FOUR: Performed by: STUDENT IN AN ORGANIZED HEALTH CARE EDUCATION/TRAINING PROGRAM

## 2024-12-17 PROCEDURE — 3600000009 HC OR TIME - EACH INCREMENTAL 1 MINUTE - PROCEDURE LEVEL FOUR: Performed by: STUDENT IN AN ORGANIZED HEALTH CARE EDUCATION/TRAINING PROGRAM

## 2024-12-17 RX ORDER — ONDANSETRON HYDROCHLORIDE 2 MG/ML
4 INJECTION, SOLUTION INTRAVENOUS ONCE AS NEEDED
Status: DISCONTINUED | OUTPATIENT
Start: 2024-12-17 | End: 2024-12-17 | Stop reason: HOSPADM

## 2024-12-17 RX ORDER — LABETALOL HYDROCHLORIDE 5 MG/ML
5 INJECTION, SOLUTION INTRAVENOUS ONCE AS NEEDED
Status: DISCONTINUED | OUTPATIENT
Start: 2024-12-17 | End: 2024-12-17 | Stop reason: HOSPADM

## 2024-12-17 RX ORDER — CIPROFLOXACIN 2 MG/ML
400 INJECTION, SOLUTION INTRAVENOUS ONCE
Status: COMPLETED | OUTPATIENT
Start: 2024-12-17 | End: 2024-12-17

## 2024-12-17 RX ORDER — PROPOFOL 10 MG/ML
INJECTION, EMULSION INTRAVENOUS AS NEEDED
Status: DISCONTINUED | OUTPATIENT
Start: 2024-12-17 | End: 2024-12-17

## 2024-12-17 RX ORDER — KETOROLAC TROMETHAMINE 30 MG/ML
INJECTION, SOLUTION INTRAMUSCULAR; INTRAVENOUS AS NEEDED
Status: DISCONTINUED | OUTPATIENT
Start: 2024-12-17 | End: 2024-12-17

## 2024-12-17 RX ORDER — SODIUM CHLORIDE, SODIUM LACTATE, POTASSIUM CHLORIDE, CALCIUM CHLORIDE 600; 310; 30; 20 MG/100ML; MG/100ML; MG/100ML; MG/100ML
20 INJECTION, SOLUTION INTRAVENOUS CONTINUOUS
Status: DISCONTINUED | OUTPATIENT
Start: 2024-12-17 | End: 2024-12-17 | Stop reason: HOSPADM

## 2024-12-17 RX ORDER — MEPERIDINE HYDROCHLORIDE 25 MG/ML
12.5 INJECTION INTRAMUSCULAR; INTRAVENOUS; SUBCUTANEOUS EVERY 10 MIN PRN
Status: DISCONTINUED | OUTPATIENT
Start: 2024-12-17 | End: 2024-12-17 | Stop reason: HOSPADM

## 2024-12-17 RX ORDER — FENTANYL CITRATE 50 UG/ML
INJECTION, SOLUTION INTRAMUSCULAR; INTRAVENOUS AS NEEDED
Status: DISCONTINUED | OUTPATIENT
Start: 2024-12-17 | End: 2024-12-17

## 2024-12-17 RX ORDER — MIDAZOLAM HYDROCHLORIDE 1 MG/ML
INJECTION, SOLUTION INTRAMUSCULAR; INTRAVENOUS AS NEEDED
Status: DISCONTINUED | OUTPATIENT
Start: 2024-12-17 | End: 2024-12-17

## 2024-12-17 RX ORDER — HYDRALAZINE HYDROCHLORIDE 20 MG/ML
5 INJECTION INTRAMUSCULAR; INTRAVENOUS EVERY 30 MIN PRN
Status: DISCONTINUED | OUTPATIENT
Start: 2024-12-17 | End: 2024-12-17 | Stop reason: HOSPADM

## 2024-12-17 RX ORDER — ALBUTEROL SULFATE 0.83 MG/ML
2.5 SOLUTION RESPIRATORY (INHALATION) ONCE AS NEEDED
Status: DISCONTINUED | OUTPATIENT
Start: 2024-12-17 | End: 2024-12-17 | Stop reason: HOSPADM

## 2024-12-17 RX ORDER — HYDROMORPHONE HYDROCHLORIDE 0.2 MG/ML
0.2 INJECTION INTRAMUSCULAR; INTRAVENOUS; SUBCUTANEOUS EVERY 5 MIN PRN
Status: DISCONTINUED | OUTPATIENT
Start: 2024-12-17 | End: 2024-12-17 | Stop reason: HOSPADM

## 2024-12-17 RX ORDER — BUPIVACAINE HYDROCHLORIDE 5 MG/ML
INJECTION, SOLUTION PERINEURAL AS NEEDED
Status: DISCONTINUED | OUTPATIENT
Start: 2024-12-17 | End: 2024-12-17 | Stop reason: HOSPADM

## 2024-12-17 RX ORDER — LIDOCAINE HYDROCHLORIDE 10 MG/ML
INJECTION, SOLUTION EPIDURAL; INFILTRATION; INTRACAUDAL; PERINEURAL AS NEEDED
Status: DISCONTINUED | OUTPATIENT
Start: 2024-12-17 | End: 2024-12-17

## 2024-12-17 RX ORDER — FENTANYL CITRATE 50 UG/ML
50 INJECTION, SOLUTION INTRAMUSCULAR; INTRAVENOUS EVERY 5 MIN PRN
Status: DISCONTINUED | OUTPATIENT
Start: 2024-12-17 | End: 2024-12-17 | Stop reason: HOSPADM

## 2024-12-17 RX ORDER — ONDANSETRON HYDROCHLORIDE 2 MG/ML
INJECTION, SOLUTION INTRAVENOUS AS NEEDED
Status: DISCONTINUED | OUTPATIENT
Start: 2024-12-17 | End: 2024-12-17

## 2024-12-17 SDOH — HEALTH STABILITY: MENTAL HEALTH: CURRENT SMOKER: 0

## 2024-12-17 ASSESSMENT — PAIN - FUNCTIONAL ASSESSMENT
PAIN_FUNCTIONAL_ASSESSMENT: 0-10

## 2024-12-17 ASSESSMENT — PAIN SCALES - GENERAL
PAINLEVEL_OUTOF10: 0 - NO PAIN
PAIN_LEVEL: 0
PAINLEVEL_OUTOF10: 0 - NO PAIN

## 2024-12-17 ASSESSMENT — COLUMBIA-SUICIDE SEVERITY RATING SCALE - C-SSRS
2. HAVE YOU ACTUALLY HAD ANY THOUGHTS OF KILLING YOURSELF?: NO
1. IN THE PAST MONTH, HAVE YOU WISHED YOU WERE DEAD OR WISHED YOU COULD GO TO SLEEP AND NOT WAKE UP?: NO

## 2024-12-17 NOTE — PERIOPERATIVE NURSING NOTE
Arrives earlier to pacu 2 Sleepy. Denies pain and nausea sfm 6 liters good rise and fall of chest with respirations. Follows commands. Lungs clear all sinus rhythm. Occasional pac.

## 2024-12-17 NOTE — H&P
"History Of Present Illness  Eligio Hogue is a 64 y.o. male presenting with elevtaed lidya.     Past Medical History  Past Medical History:   Diagnosis Date    A-fib (Multi)     Atrial flutter (Multi) 09/26/2023    Benign essential hypertension 06/10/2024    BPH (benign prostatic hyperplasia)     Colon polyp     COPD (chronic obstructive pulmonary disease) (Multi)     DM2 (diabetes mellitus, type 2) (Multi)     GERD (gastroesophageal reflux disease) 06/10/2024    Hyperlipidemia 06/10/2024    Irregular heart beat     Other chest pain 02/16/2021    Chest tightness or pressure    Personal history of other benign neoplasm     History of benign neoplasm of pharynx    Sleep apnea     does not use device    Suspected sleep apnea 06/14/2024       Surgical History  Past Surgical History:   Procedure Laterality Date    ABLATION OF DYSRHYTHMIC FOCUS      CARDIAC ELECTROPHYSIOLOGY PROCEDURE N/A 09/12/2024    Procedure: Ablation A-Fib;  Surgeon: Nickolas Bajwa MD;  Location: [facility] Cardiac Cath Lab;  Service: Electrophysiology;  Laterality: N/A;    COLONOSCOPY      OTHER SURGICAL HISTORY  03/10/2016    Excision Of Tracheal Tumor    OTHER SURGICAL HISTORY  08/09/2018    Oral Surgery Tooth Extraction [facility]        Social History  He reports that he has quit smoking. His smoking use included cigarettes. He has never used smokeless tobacco. He reports current alcohol use. He reports that he does not use drugs.    Family History  Family History   Problem Relation Name Age of Onset    Heart disease Mother      Diabetes Brother          Allergies  Penicillins and Omeprazole    Review of Systems     Physical Exam     Last Recorded Vitals  Blood pressure (!) 160/92, pulse 70, temperature 36.6 °C (97.9 °F), temperature source Temporal, resp. rate 20, height 1.905 m (6' 3\"), weight (!) 162 kg (357 lb 9.4 oz), SpO2 95%.    Relevant Results             Assessment/Plan   Assessment & Plan  Abnormal MRI, pelvis      biopsy       I spent  " minutes in the professional and overall care of this patient.      Nolberto Rubio MD

## 2024-12-17 NOTE — ANESTHESIA POSTPROCEDURE EVALUATION
Patient: Eligio Hogue    Procedure Summary       Date: 12/17/24 Room / Location: [facility] 01 / [facility]    Anesthesia Start: 1221 Anesthesia Stop: 1248    Procedure: Fusion ( Transrectal US, UroNav, MRI @ [facility] [place] ) Diagnosis:       Abnormal MRI, pelvis      (Abnormal MRI, pelvis [R93.5])    Surgeons: Nolberto Rubio MD Responsible Provider: Vernon Barakat MD    Anesthesia Type: MAC ASA Status: 3            Anesthesia Type: MAC    Vitals Value Taken Time   /71 12/17/24 1305   Temp 36.5 °C (97.7 °F) 12/17/24 1300   Pulse 63 12/17/24 1315   Resp 15 12/17/24 1315   SpO2 94 % 12/17/24 1315       Anesthesia Post Evaluation    Patient location during evaluation: PACU  Patient participation: complete - patient participated  Level of consciousness: awake  Pain score: 0  Pain management: adequate  Airway patency: patent  Cardiovascular status: acceptable and hemodynamically stable  Respiratory status: acceptable, spontaneous ventilation and face mask  Hydration status: acceptable  Postoperative Nausea and Vomiting: none      No notable events documented.

## 2024-12-17 NOTE — ANESTHESIA PREPROCEDURE EVALUATION
Patient: Eligio Hogue    Procedure Information       Date/Time: 12/17/24 1105    Procedure: Fusion ( Transrectal US, UroNav, [place] [facility] [place]    [place]: [facility] / [facility]    Surgeons: Nolberto Rubio MD            Relevant Problems   Cardiac   (+) Atrial flutter (Multi)   (+) Benign essential hypertension   (+) Hyperlipidemia   (+) Paroxysmal atrial fibrillation (Multi)      Pulmonary   (+) FLOYD (obstructive sleep apnea)      GI   (+) GERD (gastroesophageal reflux disease)      Endocrine   (+) Hypothyroidism   (+) Type 2 diabetes mellitus      Musculoskeletal   (+) Primary osteoarthritis of both feet     Past Surgical History:   Procedure Laterality Date    ABLATION OF DYSRHYTHMIC FOCUS      CARDIAC ELECTROPHYSIOLOGY PROCEDURE N/A 09/12/2024    Procedure: Ablation A-Fib;  Surgeon: Nickolas Bajwa MD;  Location: [facility] Cardiac Cath Lab;  Service: Electrophysiology;  Laterality: N/A;    COLONOSCOPY      OTHER SURGICAL HISTORY  03/10/2016    Excision Of Tracheal Tumor    OTHER SURGICAL HISTORY  08/09/2018    Oral Surgery Tooth Extraction [place] [facility]     Allergies   Allergen Reactions    Penicillins Other     Blisters in mouth      Omeprazole Rash     Patient unsure of reaction         Clinical information reviewed:   Tobacco  Allergies  Meds   Med Hx  Surg Hx   Fam Hx  Soc Hx        NPO Detail:  No data recorded     Physical Exam    Airway  Mallampati: III  TM distance: >3 FB  Neck ROM: full     Cardiovascular   Comments: deferred   Dental    Pulmonary   Comments: deferred   Abdominal     Comments: deferred           Anesthesia Plan    History of general anesthesia?: yes  History of complications of general anesthesia?: no    ASA 3     MAC     The patient is not a current smoker.  Education provided regarding risk of obstructive sleep apnea.  intravenous induction   Postoperative administration of opioids is intended.  Anesthetic plan and risks discussed with patient.    Plan discussed with CRNA.

## 2024-12-17 NOTE — PERIOPERATIVE NURSING NOTE
"Denies pain and nausea Skin warm and dry prefers not to have blood sugar rechecked at this time\" They just checked it I'm fine.\"  "

## 2024-12-17 NOTE — DISCHARGE INSTRUCTIONS
Dr. Rubio - [facility]  Phone: [phone]    Follow-Up Information    Following your Prostate Biopsy, please follow up with Dr. Rubio as scheduled    Medication  Please take the medications as prescribed by your doctor. Tylenol or non-steroids! anti-inflammatory medications (such as Aleve®) should relieve mild pain and discomfort. Resume the usual medications you took before surgery unless instructed otherwise.    Resuming Activities and Driving  *NO Driving on the day of surgery  *You may resume driving the day after surgery  *You may resume normal activities as tolerated  *You may shower     Diet  You may resume your normal diet once at home, with no additional considerations.    Expected Signs and Symptoms  You may have a small amount of bleeding with urination on occasion. This may be accompanied with small blood clots. This is normal and should be relieved by increasing your fluid intake.  You may experience some mild burning and discomfort during urination. This is normal and should subside in one to two weeks.      When to Call Your Doctor - Dr. Rubio [phone]  Please call the office immediately if any of the following symptoms appear:    *Inability to eat, drink, or take medication  *Persistent Nausea or Vomiting  *Fever over 100.4 degrees F. (38 degrees C.)    Please call 9-1-1 if you experience any of the following:  *Chest Pain, Shortness of Breath or Difficulty Breathing  *Sudden one sided weakness/slurred speech/ any signs or symptoms of a stroke  *Severe headache or visual disturbance    Additional Home-Going Instructions for Adults Who Have Had Anesthesia or Sedatives:    The anesthetics, sedatives and pain killers which were given to you will be acting in your body for the next 24 hours.  This may cause you to feel sleepy.  This feeling will slowly wear off.    For the next 24 hours you SHOULD NOT:  *Drive a car, or operate machinery or power tools  *Drink any form of alcohol  (including beer or wine)  *Make any important Decisions

## 2024-12-17 NOTE — OP NOTE
Fusion ( Transrectal US, UroNav, MRI @ [facility] [place] ) Operative Note     Date: 2024  OR Location: [facility]    Name: Eligio Hogue : 1960, Age: 64 y.o., MRN: [id number], Sex: male    Diagnosis  Pre-op Diagnosis      * Abnormal MRI, pelvis [R93.5] Post-op Diagnosis     * Abnormal MRI, pelvis [R93.5]     Procedures  Fusion ( Transrectal US, UroNav, MRI @ [facility] [place] )  [id number] - [facility] US GUIDANCE NEEDLE PLACEMENT IMG S&I      Surgeons      * Nolberto Rubio - Primary    Resident/Fellow/Other Assistant:  Surgeons and Role:  * No surgeons found with a matching role *    Staff:   Circulator: Mariangel  Scrub Person: Sebastian        Estimated Blood Loss (ml)  5.   Specimen(s) Removed  Removed region of interest and systematic biopsies.   Drain(s)  None.   Implant(s)  None.   Complications  None.   Indications (History)  Elevated PSA.   Findings of Procedure  30g prostate, ceferino heterogenous.   Description of Procedure  After administration of anesthesia, a prostate block was performed and transrectal ultrasound. Transperineal biopsies taken from region of interest and systematic template performed using the precision point device.

## 2025-01-02 ENCOUNTER — APPOINTMENT (OUTPATIENT)
Dept: UROLOGY | Facility: CLINIC | Age: 65
End: 2025-01-02
Payer: COMMERCIAL

## 2025-01-02 DIAGNOSIS — R97.20 ELEVATED PSA: Primary | ICD-10-CM

## 2025-01-02 DIAGNOSIS — C61 MALIGNANT NEOPLASM OF PROSTATE (MULTI): ICD-10-CM

## 2025-01-02 DIAGNOSIS — N30.01 ACUTE CYSTITIS WITH HEMATURIA: ICD-10-CM

## 2025-01-02 LAB
LAB AP ASR DISCLAIMER: NORMAL
LAB AP BLOCK FOR ADDITIONAL STUDIES: NORMAL
LABORATORY COMMENT REPORT: NORMAL
PATH REPORT.FINAL DX SPEC: NORMAL
PATH REPORT.GROSS SPEC: NORMAL
PATH REPORT.RELEVANT HX SPEC: NORMAL
PATH REPORT.TOTAL CANCER: NORMAL

## 2025-01-02 PROCEDURE — 99214 OFFICE O/P EST MOD 30 MIN: CPT | Performed by: STUDENT IN AN ORGANIZED HEALTH CARE EDUCATION/TRAINING PROGRAM

## 2025-01-02 PROCEDURE — 4010F ACE/ARB THERAPY RXD/TAKEN: CPT | Performed by: STUDENT IN AN ORGANIZED HEALTH CARE EDUCATION/TRAINING PROGRAM

## 2025-01-02 RX ORDER — CEPHALEXIN 500 MG/1
500 CAPSULE ORAL 2 TIMES DAILY
Qty: 28 CAPSULE | Refills: 0 | Status: SHIPPED | OUTPATIENT
Start: 2025-01-02 | End: 2025-01-16

## 2025-01-02 NOTE — PROGRESS NOTES
HPI:  Proc (12/17/24): TP prostate biopsy   Path: prostatic adenocarcinoma, LIZBETH #1 GG2 (Michael score 3+4=7), 7/7 cores (80% of tissue), pattern 4 (10%)    Eligio Hogue is a 64 y.o. male referred by Dr. Hendricks for elevated PSA. Hx of Afib s/p cardiac radiofrequency ablation (9/12/24), HTN, GERD, HLD, DM2, FLOYD. MRI Prostate (9/20/24) showed 44.8g, a PI-RADS 4 lesion in the left mid posterior PZ, there is broad-based abutment with the adjacent prostatic capsule with focal irregularity along the capsular margin suspicious for extracapsular extension, no enlarged pelvic lymph nodes. S/p TP prostate biopsy (12/17/24) with pathology showing prostatic adenocarcinoma, LIZBETH #1 GG2 ([place] score 3+4=7), 7/7 cores (80% of tissue), pattern 4 (10%). Reports urinary retention and UTI symptoms started Monday and started Keflex (prior to biopsy). Endorses ED.     On Eliquis   PSA: 2.19 (6/17/24)    MRI Prostate (9/20/24): 44.8g, a PI-RADS 4 lesion in the left mid posterior PZ, there is broad-based abutment with the adjacent prostatic capsule with focal irregularity along the capsular margin suspicious for extracapsular extension, no enlarged pelvic lymph nodes.    Review of Systems:  All systems reviewed. Anything negative noted in the HPI.    Physical Exam:  Virtual visit.     Procedures:    Assessment/Plan   Eligio Hogue is a 64 y.o. male referred by Dr. Hendricks for elevated PSA. Hx of Afib s/p cardiac radiofrequency ablation (9/12/24), HTN, GERD, HLD, DM2, FLOYD. MRI Prostate (9/20/24) showed 44.8g, a PI-RADS 4 lesion in the left mid posterior PZ, there is broad-based abutment with the adjacent prostatic capsule with focal irregularity along the capsular margin suspicious for extracapsular extension, no enlarged pelvic lymph nodes. S/p TP prostate biopsy (12/17/24) with pathology showing prostatic adenocarcinoma, LIZBETH #1 GG2 (Michael score 3+4=7), 7/7 cores (80% of tissue), pattern 4 (10%). Reports urinary retention and UTI  symptoms started Monday and started Keflex (prior to biopsy). Endorses ED. Management options including risks, benefits and alternatives discussed at length and all questions answered. Patient prefers to proceed with referral to radiation oncology for additional treatment opinions and follow-up in 1 month.    Will send patient surgery packet today.     Rx Keflex        Scribe Attestation:  By signing my name below, Radha FARMER, [employer]   attest that this documentation has been prepared under the direction and in the presence of Nolberto Rubio MD.

## 2025-01-15 PROBLEM — C61 PROSTATE CANCER (MULTI): Status: ACTIVE | Noted: 2025-01-15

## 2025-01-15 NOTE — PROGRESS NOTES
HPI:  Proc (12/17/24): TP prostate biopsy   Path: prostatic adenocarcinoma, LIZBETH #1 GG2 (Michael score 3+4=7), 7/7 cores (80% of tissue), pattern 4 (10%)    Eligio Hogue is a 64 y.o. male referred by Dr. Hendricks for elevated PSA. Hx of Afib s/p cardiac radiofrequency ablation (9/12/24), HTN, GERD, HLD, DM2, FLOYD. MRI Prostate (9/20/24) showed 44.8g, a PI-RADS 4 lesion in the left mid posterior PZ, there is broad-based abutment with the adjacent prostatic capsule with focal irregularity along the capsular margin suspicious for extracapsular extension, no enlarged pelvic lymph nodes. S/p TP prostate biopsy (12/17/24) with pathology showing prostatic adenocarcinoma, LIZBETH #1 GG2 ([place] score 3+4=7), 7/7 cores (80% of tissue), pattern 4 (10%). Endorses ED.     On Eliquis   PSA: 2.19 (6/17/24)    MRI Prostate (9/20/24): 44.8g, a PI-RADS 4 lesion in the left mid posterior PZ, there is broad-based abutment with the adjacent prostatic capsule with focal irregularity along the capsular margin suspicious for extracapsular extension, no enlarged pelvic lymph nodes.    Review of Systems:  All systems reviewed. Anything negative noted in the HPI.    Physical Exam:  Virtual visit.     Procedures:    Assessment/Plan   Eligio Hogue is a 64 y.o. male referred by Dr. Hendricks for elevated PSA. Hx of Afib s/p cardiac radiofrequency ablation (9/12/24), HTN, GERD, HLD, DM2, FLOYD. MRI Prostate (9/20/24) showed 44.8g, a PI-RADS 4 lesion in the left mid posterior PZ, there is broad-based abutment with the adjacent prostatic capsule with focal irregularity along the capsular margin suspicious for extracapsular extension, no enlarged pelvic lymph nodes. S/p TP prostate biopsy (12/17/24) with pathology showing prostatic adenocarcinoma, LIZBETH #1 GG2 ([place] score 3+4=7), 7/7 cores (80% of tissue), pattern 4 (10%). Management options including risks, benefits and alternatives discussed at length and all questions answered. Patient will follow up  with Dr. Saleh to discuss treatment options. Patient may benefit from HoLEP prior to radiation given incomplete emptying and multiple UTI's.       Scribe Attestation:  By signing my name below, IRadha Scribe   attest that this documentation has been prepared under the direction and in the presence of Nolberto Rubio MD.       By signing my name below, IMegha Scribe   attest that this documentation has been prepared under the direction and in the presence of Nolberto Rubio MD.

## 2025-01-16 ENCOUNTER — APPOINTMENT (OUTPATIENT)
Dept: UROLOGY | Facility: CLINIC | Age: 65
End: 2025-01-16
Payer: COMMERCIAL

## 2025-01-16 DIAGNOSIS — C61 PROSTATE CANCER (MULTI): Primary | ICD-10-CM

## 2025-01-16 PROCEDURE — 99213 OFFICE O/P EST LOW 20 MIN: CPT | Performed by: STUDENT IN AN ORGANIZED HEALTH CARE EDUCATION/TRAINING PROGRAM

## 2025-01-16 PROCEDURE — 4010F ACE/ARB THERAPY RXD/TAKEN: CPT | Performed by: STUDENT IN AN ORGANIZED HEALTH CARE EDUCATION/TRAINING PROGRAM

## 2025-01-17 ENCOUNTER — TELEPHONE (OUTPATIENT)
Dept: RADIATION ONCOLOGY | Facility: HOSPITAL | Age: 65
End: 2025-01-17
Payer: COMMERCIAL

## 2025-01-21 ENCOUNTER — HOSPITAL ENCOUNTER (OUTPATIENT)
Dept: RADIATION ONCOLOGY | Facility: HOSPITAL | Age: 65
Setting detail: RADIATION/ONCOLOGY SERIES
Discharge: HOME | End: 2025-01-21
Payer: COMMERCIAL

## 2025-01-21 DIAGNOSIS — C61 PROSTATE CANCER (MULTI): Primary | ICD-10-CM

## 2025-01-21 DIAGNOSIS — C61 MALIGNANT NEOPLASM OF PROSTATE (MULTI): ICD-10-CM

## 2025-01-21 PROCEDURE — 99214 OFFICE O/P EST MOD 30 MIN: CPT | Mod: 95 | Performed by: STUDENT IN AN ORGANIZED HEALTH CARE EDUCATION/TRAINING PROGRAM

## 2025-01-21 PROCEDURE — 99204 OFFICE O/P NEW MOD 45 MIN: CPT | Performed by: STUDENT IN AN ORGANIZED HEALTH CARE EDUCATION/TRAINING PROGRAM

## 2025-01-21 ASSESSMENT — PAIN SCALES - GENERAL: PAINLEVEL_OUTOF10: 0-NO PAIN

## 2025-01-21 ASSESSMENT — ENCOUNTER SYMPTOMS
DEPRESSION: 0
LEG SWELLING: 1
ENDOCRINE NEGATIVE: 1
FATIGUE: 1
GASTROINTESTINAL NEGATIVE: 1
RESPIRATORY NEGATIVE: 1
HEMATOLOGIC/LYMPHATIC NEGATIVE: 1
OCCASIONAL FEELINGS OF UNSTEADINESS: 0
PSYCHIATRIC NEGATIVE: 1
NEUROLOGICAL NEGATIVE: 1
MUSCULOSKELETAL NEGATIVE: 1
LOSS OF SENSATION IN FEET: 0
EYES NEGATIVE: 1

## 2025-01-21 ASSESSMENT — PATIENT HEALTH QUESTIONNAIRE - PHQ9
2. FEELING DOWN, DEPRESSED OR HOPELESS: NOT AT ALL
SUM OF ALL RESPONSES TO PHQ9 QUESTIONS 1 AND 2: 0
1. LITTLE INTEREST OR PLEASURE IN DOING THINGS: NOT AT ALL

## 2025-01-21 NOTE — PROGRESS NOTES
Radiation Oncology Nursing Note    IPSS (International Prostate Symptom Score):   7 / 35, bother 2   - He is experiencing urinary incontinence due to urgency. Pads x1 per day (just in case)   - He is not experiencing dysuria, hematuria, flank pain.     Sexual function:   - Quality of erections during the last 4 weeks: 4  - Use of erectile dysfunction medications:  None    Bowel function:    - Denies hematochezia or pain.    - He does not have a history of hemorrhoids.    - He does not have a history of inflammatory bowel disease (Crohn's, Ulcerative Colitis).    Prior Radiotherapy:  No  Radiation Treatments       No radiation treatments to show. (Treatments may have been administered in another system.)          Current Systemic Treatment:  No     Presence of Pacemaker or ICD:  No    History of Autoimmune or Connective Tissue Disorders:  No    Pain: The patient's current pain level was assessed.  They report currently having a pain of 0 out of 10.  They feel their pain is under control without the use of pain medications.    Review of Systems:  Review of Systems   Constitutional:  Positive for fatigue.   HENT:  Negative.     Eyes: Negative.    Respiratory: Negative.     Cardiovascular:  Positive for leg swelling (crhonic Lt leg swelling).   Gastrointestinal: Negative.    Endocrine: Negative.    Genitourinary:  Positive for bladder incontinence (due to urgency - wearing 1 pad per day).         Urgency almost always, Pt states PVR was 2L and doesn't feel the sensation to urinate often - going 3 times during the day   Musculoskeletal: Negative.    Skin: Negative.    Neurological: Negative.    Hematological: Negative.    Psychiatric/Behavioral: Negative.

## 2025-01-21 NOTE — PROGRESS NOTES
Staff Physician: Esthela Saleh MD PhD  Referring Physician: Nolberto Rubio MD  Date of Service: 1/21/2025  Patient name: Eligio Hogue   MRN: [id number]    RADIATION ONCOLOGY CONSULT NOTE  Virtual Visit Statement: An interactive audio and video telecommunications system which permits real-time communications between the patient at the originating site and provider at the distant site was utilized to provide this telehealth service.    Verbal consent for encounter: Verbal consent was requested and obtained from the patient on this date for a telehealth visit.    IDENTIFYING DATA:  DIAGNOSIS: Newly diagnosed, localized   Cancer Staging   Prostate cancer (Multi)  Staging form: Prostate, AJCC 8th Edition  - Clinical stage from 12/17/2024: cT1c, cN0, PSA: 2.2, Grade Group: 2 - Signed by Esthela Saleh MD PhD on 1/21/2025    DISEASE STATE: No prior treatment  DISEASE STATUS: Active surveillance  Problem List Items Addressed This Visit       Prostate cancer (Multi) - Primary    Relevant Orders    Prostate Specific Antigen, Screen     Other Visit Diagnoses       Malignant neoplasm of prostate (Multi)        Relevant Orders    Referral to Radiation Oncology          Mr. Eligio Hogue is a 64-year-old with newly diagnosed prostate adenocarcinoma, referred by Nolberto Sales MD, for evaluation and discussion of treatment recommendations.    HISTORY OF PRESENT ILLNESS:  9/8/2023 PSA 1.78    6/17/2024 PSA 2.19    8/30/2024 Presented to Dr. Hendricks for urinary retention, PVR was 2.2 L     9/20/2024 MRI prostate in the setting of urinary retention notes 44.8 g gland, PI-RADS 4 lesion in the left PZ at mid gland, broad-based capsular abutment along capsule margin suspicious for PAUL, no SV invasion.    12/17/2024 systematic and targeted biopsy noted GG2, 1/12 cores+ in the left posterior lateral, 1/1 targeted core+, PNI+    Today, Mr. Eligio Hogue is on video by himself.  Symptomatically, he reports:    1.  Full independence  in activities of daily living. Works full time as .   2.  Urinary function -- IPSS of 7 with urge incontinence, wears 1 pad a day. Has decreased sensation to go to the bathroom.   3.  Bowel function is normal. Last colonoscopy 04/2024, incomplete polyp resection, will repeat colonoscopy later this month.   4.  Normal appetite. No unintentional weight gain or loss.   5.  Pain score: 0/10   6.  He denies a personal history of MI or stroke. Has history of diabetes, HgbA1c is 7.3%.     PAST MEDICAL HISTORY:  Past Medical History:   Diagnosis Date    A-fib (Multi)     Atrial flutter (Multi) 09/26/2023    Benign essential hypertension 06/10/2024    BPH (benign prostatic hyperplasia)     Colon polyp     COPD (chronic obstructive pulmonary disease) (Multi)     DM2 (diabetes mellitus, type 2) (Multi)     GERD (gastroesophageal reflux disease) 06/10/2024    Hyperlipidemia 06/10/2024    Hypothyroidism 06/10/2024    Irregular heart beat     Other chest pain 02/16/2021    Chest tightness or pressure    Personal history of other benign neoplasm     History of benign neoplasm of pharynx    Sleep apnea     does not use device    Suspected sleep apnea 06/14/2024    Urinary tract infection      PAST SURGICAL HISTORY:  Past Surgical History:   Procedure Laterality Date    ABLATION OF DYSRHYTHMIC FOCUS      CARDIAC CATHETERIZATION  9/12/24    CARDIAC ELECTROPHYSIOLOGY PROCEDURE N/A 09/12/2024    Procedure: Ablation A-Fib;  Surgeon: Nickolas Bajwa MD;  Location: [facility] Card[facility] Lab;  Service: Electrophysiology;  Laterality: N/A;    COLONOSCOPY      OTHER SURGICAL HISTORY      Excision Of Tracheal Tumor    OTHER SURGICAL HISTORY  08/09/2018    Oral Surgery Tooth Extraction [place] [facility]     ALLERGIES:  Allergies   Allergen Reactions    Penicillins Other     Blisters in mouth      Omeprazole Rash     Patient unsure of reaction     MEDICATIONS:    Current Outpatient Medications:     apixaban (Eliquis) 5 mg tablet, Take 1 tablet  (5 mg) by mouth 2 times a day., Disp: 180 tablet, Rfl: 3    fluticasone (Flonase) 50 mcg/actuation nasal spray, Administer 1 spray into each nostril once daily. Shake gently. Before first use, prime pump. After use, clean tip and replace cap., Disp: 16 g, Rfl: 12    losartan (Cozaar) 50 mg tablet, Take 1 tablet (50 mg) by mouth once daily., Disp: 90 tablet, Rfl: 1    metFORMIN  mg 24 hr tablet, Take 1 tablet (500 mg) by mouth 2 times daily (morning and late afternoon)., Disp: 180 tablet, Rfl: 1    metoprolol tartrate (Lopressor) 25 mg tablet, Take 1 tablet (25 mg) by mouth 2 times a day., Disp: 180 tablet, Rfl: 1    rosuvastatin (Crestor) 20 mg tablet, Take 0.5 tablets (10 mg) by mouth once daily., Disp: , Rfl:     tamsulosin (Flomax) 0.4 mg 24 hr capsule, Take 1 capsule (0.4 mg) by mouth once daily., Disp: 30 capsule, Rfl: 11    GaviLyte-G 236-22.74-6.74 -5.86 gram solution, This medication is for patient's upcoming colonoscopy in January 2025., Disp: , Rfl:    SOCIAL HISTORY:  Social History     Tobacco Use    Smoking status: Former     Types: Cigarettes    Smokeless tobacco: Never    Tobacco comments:     Patient denies adverse reactions with anesthesia, patient denies family issues with anesthesia.    Substance Use Topics    Alcohol use: Yes     Comment: rarely     FAMILY HISTORY:  Family History   Problem Relation Name Age of Onset    Heart disease Mother Lupe De La Cruz     Kidney disease Mother Lupe De La Cruz     Diabetes Sister Heide Lightinheimer     Endometrial cancer Sister Heide     Diabetes type II Sister Heide     Colon cancer Sister Heide     Diabetes Brother [facility]     Alcohol abuse Brother [facility]        REVIEW OF SYSTEMS:  Please refer to RN note.    PHYSICAL EXAMINATION:  Exam limited by telemedicine.  Patient is alert and in no acute distress. Normal work of breathing. Cranial nerves grossly intact.       CTCAE (v5) ADVERSE EVENTS:  Toxicity Assessment          1/21/2025     "13:00   Toxicity Assessment   Treatment Site Prostate RT   Diarrhea Grade 0   Hematuria Grade 0   Urinary Incontinence Grade 2       1 pad a day from urge incontinence   Erectile Dysfunction Grade 1       erectile function improving after stopping flecainide   Urinary Frequency Grade 0   Urinary Retention Grade 1   Urinary Urgency Grade 1       PERFORMANCE STATUS:  KPS/ECO, Able to carry on normal activity; minor signs or symptoms of disease (ECOG equivalent 0)    LABORATORY AND IMAGING DATA:  Imaging: All imaging was personally reviewed and interpreted in clinic. Findings as per HPI and EMR.    Laboratory/Pathology:  All pertinent labs and pathology were personally reviewed and interpreted in clinic. Findings as per HPI and EMR.  Lab Results   Component Value Date    PSA 2.19 2024    PSA 1.78 2023    PSASCREEN 1.84 2022    PSASCREEN 1.53 2021    PSASCREEN 1.52 2019     No results found for: \"TESTOSTERONE\", \"TESTOTOTMS\"      Lab Results   Component Value Date    BUN 20 10/25/2024    CREATININE 1.17 10/25/2024    EGFR 69 10/25/2024     10/25/2024    K 4.7 10/25/2024     10/25/2024    CO2 30 10/25/2024    CALCIUM 8.6 10/25/2024    HGBA1C 7.3 (H) 10/25/2024        IMPRESSION:  64 year-old gentleman with newly diagnosed prostate adenocarcinoma, iPSA 2.19, GG2 (1/12 cores+ in the left posterior lateral, 1/1 targeted core+, PNI+).     He falls into intermediate favorable risk prostate cancer based on his clinical-pathologic factors. He is not in any pain.     Per SSA tables, he has at least 10 years of life expectancy.  We discussed the recent update to the ProtecT randomized trial that compared 15-year outcomes for prostate cancer patients managed with active surveillance or definitive therapy and found that, while overall survival was no different, there were higher rates of distant metastases and disease progression with active surveillance (NE ), albeit the AS on " that trial was less rigorous. We discussed that AS involves a secondary confirmatory biopsy, followed by routine PSA check, with surveillance imaging and repeat biopsies no more than once a year or if concern for progression by PSA.      He is a candidate for definitive management, including either surgery or radiation. I explained the logistics of radiation therapy, including CT simulation, the use of daily image guidance to improve prostate localization, and the recommendation of fiducials and spacer gel placement to reduce potential toxic rectal toxicities.  Potential acute and long-term side effects of radiation were discussed, including acute and late side effects, including toxicities to bladder, rectum, bowel, urethra, erectile dysfunction, infertility and risk of secondary malignancy.  I discussed various EBRT treatment regimens, including moderate hypofractionation over 20 treatments, and ultra hypofractionation over 5 treatments. His IPSS is 7 with urinary retention (PVR 2.2L) and urge incontinence; as such, would proceed with 20 fractions if treating with definitive radiation.     PLAN:  -check PSA  -we will call when Decipher results   -he will follow up with Dr. Hendricks to discuss urinary outlet procedures, if proceeding with radiation, this needs to be performed prior to radiation     Thank you for the opportunity to participate in the care of this kind patient.    Esthela Saleh MD PhD  , Radiation Oncology

## 2025-01-23 ENCOUNTER — OFFICE VISIT (OUTPATIENT)
Dept: CARDIOLOGY | Facility: HOSPITAL | Age: 65
End: 2025-01-23
Payer: COMMERCIAL

## 2025-01-23 VITALS
SYSTOLIC BLOOD PRESSURE: 118 MMHG | DIASTOLIC BLOOD PRESSURE: 81 MMHG | RESPIRATION RATE: 18 BRPM | HEART RATE: 67 BPM | BODY MASS INDEX: 39.17 KG/M2 | HEIGHT: 75 IN | WEIGHT: 315 LBS | OXYGEN SATURATION: 94 %

## 2025-01-23 DIAGNOSIS — E66.01 MORBID OBESITY (MULTI): ICD-10-CM

## 2025-01-23 DIAGNOSIS — I48.0 PAROXYSMAL ATRIAL FIBRILLATION (MULTI): Primary | ICD-10-CM

## 2025-01-23 DIAGNOSIS — I10 BENIGN ESSENTIAL HYPERTENSION: ICD-10-CM

## 2025-01-23 DIAGNOSIS — E78.00 PURE HYPERCHOLESTEROLEMIA: ICD-10-CM

## 2025-01-23 DIAGNOSIS — I71.21 ANEURYSM OF ASCENDING AORTA WITHOUT RUPTURE (CMS-HCC): ICD-10-CM

## 2025-01-23 LAB
ATRIAL RATE: 67 BPM
P AXIS: -3 DEGREES
P OFFSET: 191 MS
P ONSET: 132 MS
PR INTERVAL: 178 MS
Q ONSET: 221 MS
QRS COUNT: 11 BEATS
QRS DURATION: 88 MS
QT INTERVAL: 370 MS
QTC CALCULATION(BAZETT): 390 MS
QTC FREDERICIA: 384 MS
R AXIS: 38 DEGREES
T AXIS: 53 DEGREES
T OFFSET: 406 MS
VENTRICULAR RATE: 67 BPM

## 2025-01-23 PROCEDURE — 1036F TOBACCO NON-USER: CPT | Performed by: NURSE PRACTITIONER

## 2025-01-23 PROCEDURE — 3079F DIAST BP 80-89 MM HG: CPT | Performed by: NURSE PRACTITIONER

## 2025-01-23 PROCEDURE — 4010F ACE/ARB THERAPY RXD/TAKEN: CPT | Performed by: NURSE PRACTITIONER

## 2025-01-23 PROCEDURE — 3074F SYST BP LT 130 MM HG: CPT | Performed by: NURSE PRACTITIONER

## 2025-01-23 PROCEDURE — 93005 ELECTROCARDIOGRAM TRACING: CPT | Performed by: NURSE PRACTITIONER

## 2025-01-23 PROCEDURE — 99214 OFFICE O/P EST MOD 30 MIN: CPT | Performed by: NURSE PRACTITIONER

## 2025-01-23 PROCEDURE — 1159F MED LIST DOCD IN RCRD: CPT | Performed by: NURSE PRACTITIONER

## 2025-01-23 PROCEDURE — 3008F BODY MASS INDEX DOCD: CPT | Performed by: NURSE PRACTITIONER

## 2025-01-23 NOTE — PATIENT INSTRUCTIONS
Continue current meds  Check blood work fasting lipid panel   Follow up in September with Dr. Hernandez  Increase physical activity and weight loss  Consider Mediterranean diet  You can hold Eliquis 2 days prior to colonoscopy and resume when safe to do so by performing physician.   You need to schedule CT of chest to be done in July [phone]

## 2025-01-23 NOTE — PROGRESS NOTES
Primary Care Physician: Vernon Estrada MD  Date of Visit: 01/23/2025  9:00 AM EST  Location of visit: [facility]     Chief Complaint:   Chief Complaint   Patient presents with    Follow-up        HPI / Summary:   Eligio Hogue is a 64 y.o. male presents for followup. Seen in collaboration with Dr. Hernandez. He is overall feeling better. He has a more chronic mild dyspnea on exertion when walking prolonged distance and snow blowing that is unchanged. He reports dyspnea on exertion has been ongoing since he stopped walking on treadmill a couple years ago. His thigh weakness improved on lower dose Rosuvastatin. He was unable to tolerate CPAP. His fatigue and energy has improved. He has not been exercising. The patient denies chest pain, palpitations, lightheadedness, syncope, orthopnea, paroxysmal nocturnal dyspnea, or bleeding problems.           Past Medical History:  Past Medical History:   Diagnosis Date    A-fib (Multi)     Atrial flutter (Multi) 09/26/2023    Benign essential hypertension 06/10/2024    BPH (benign prostatic hyperplasia)     Colon polyp     COPD (chronic obstructive pulmonary disease) (Multi)     DM2 (diabetes mellitus, type 2) (Multi)     GERD (gastroesophageal reflux disease) 06/10/2024    Hyperlipidemia 06/10/2024    Hypothyroidism 06/10/2024    Irregular heart beat     Other chest pain 02/16/2021    Chest tightness or pressure    Personal history of other benign neoplasm     History of benign neoplasm of pharynx    Sleep apnea     does not use device    Suspected sleep apnea 06/14/2024    Urinary tract infection         Past Surgical History:  Past Surgical History:   Procedure Laterality Date    ABLATION OF DYSRHYTHMIC FOCUS      CARDIAC CATHETERIZATION  9/12/24    CARDIAC ELECTROPHYSIOLOGY PROCEDURE N/A 09/12/2024    Procedure: Ablation A-Fib;  Surgeon: Nickolas Bajwa MD;  Location: [facility] Cardiac [facility];  Service: Electrophysiology;  Laterality: N/A;    COLONOSCOPY      OTHER SURGICAL  "HISTORY      Excision Of Tracheal Tumor    OTHER SURGICAL HISTORY  08/09/2018    Oral Surgery Tooth Extraction [place] Tooth          Social History:   reports that he has quit smoking. His smoking use included cigarettes. He has never used smokeless tobacco. He reports current alcohol use. He reports that he does not use drugs.     Family History:  family history includes Alcohol abuse in his brother; Colon cancer in his sister; Diabetes in his brother and sister; Diabetes type II in his sister; Endometrial cancer in his sister; Heart disease in his mother; Kidney disease in his mother.      Allergies:  Allergies   Allergen Reactions    Penicillins Other     Blisters in mouth      Omeprazole Rash     Patient unsure of reaction       Outpatient Medications:  Current Outpatient Medications   Medication Instructions    apixaban (ELIQUIS) 5 mg, oral, 2 times daily    fluticasone (Flonase) 50 mcg/actuation nasal spray 1 spray, Each Nostril, Daily, Shake gently. Before first use, prime pump. After use, clean tip and replace cap.    GaviLyte-G 236-22.74-6.74 -5.86 gram solution This medication is for patient's upcoming colonoscopy in January 2025.    losartan (COZAAR) 50 mg, oral, Daily    metFORMIN XR (GLUCOPHAGE-XR) 500 mg, oral, 2 times daily (morning and late afternoon)    metoprolol tartrate (LOPRESSOR) 25 mg, oral, 2 times daily    rosuvastatin (CRESTOR) 10 mg, oral, Daily    tamsulosin (FLOMAX) 0.4 mg, oral, Daily       Physical Exam:  Vitals:    01/23/25 0906   BP: 118/81   BP Location: Right arm   Patient Position: Sitting   Pulse: 67   Resp: 18   SpO2: 94%   Weight: (!) 163 kg (359 lb)   Height: 1.905 m (6' 3\")     Wt Readings from Last 5 Encounters:   01/23/25 (!) 163 kg (359 lb)   12/17/24 (!) 162 kg (357 lb 9.4 oz)   12/10/24 (!) 163 kg (360 lb)   12/03/24 (!) 164 kg (361 lb 1.8 oz)   11/21/24 (!) 162 kg (358 lb)     Body mass index is 44.87 kg/m².     GENERAL: alert, cooperative, pleasant, in no acute " distress  SKIN: warm and dry  NECK: Normal JVD, negative HJR  CARDIAC: Regular rate and rhythm with no rubs, murmurs, or gallops  CHEST: Normal respiratory efforts, lungs clear to auscultation bilaterally.  ABDOMEN: soft, nontender, nondistended  EXTREMITIES: +1 bilateral non pitting lower extremity edema, +2 palpable RP and +2 palpable PT pulses bilaterally       Last Labs:  Recent Labs     10/25/24  1059 08/30/24  1044 06/17/24  1506   WBC 8.4 9.4 8.3   HGB 13.2* 13.9 13.6   HCT 40.6* 43.1 42.6    335 284   MCV 86 88 92     Recent Labs     10/25/24  1059 08/30/24  1044 06/17/24  1506    138 142   K 4.7 4.9 4.8    100 104   BUN 20 18 19   CREATININE 1.17 1.26 1.21     CMP -  Lab Results   Component Value Date    CALCIUM 8.6 10/25/2024    PROT 6.0 (L) 06/17/2024    ALBUMIN 4.0 06/17/2024    AST 14 06/17/2024    ALT 16 06/17/2024    ALKPHOS 61 06/17/2024    BILITOT 0.6 06/17/2024       LIPID PANEL -   Lab Results   Component Value Date    CHOL 89 06/17/2024    HDL 30.3 06/17/2024    LDLF 35 09/08/2023    TRIG 187 (H) 06/17/2024   LDL 37 6/17/24    Lab Results   Component Value Date     (H) 10/25/2024    HGBA1C 7.3 (H) 10/25/2024       Last Cardiology Tests:  ECG:  Obtained and reviewed EKG- normal sinus rhythm HR 67, low voltage QRS    Holter monitor September 2023  27% atrial fibrillation/flutter burden with the longest episode lasting 12 hours and 53 minutes.  Maximum heart rate 153 bpm and average heart rate 70 bpm minimum heart rate 41 bpm.     Echo:  Echocardiogram August 25, 2023  Normal LV function with an ejection fraction of 60 to 65%  Trace to mild aortic regurgitation  Mild mitral regurgitation  No evidence of LVH with a septal measurement of 1.0 cm and a posterior wall measurement of 1.0 cm  Normal diastolic filling  The left atrium was normal in size  Mild to moderate tricuspid regurgitation with an estimated right ventricular systolic pressure of 26 mmHg        Cath:         Stress Test:  Exercise nuclear stress test August 22, 2023  The patient exercised for 7 minutes on a standard Amadou protocol achieving 7 METS.  Peak heart rate was 125 bpm which was 80% of his maximum predicted heart rate.  Peak blood pressure was 180/86.  Nonischemic EKG at 80% maximum predicted heart rate.  SPECT images were normal with diaphragmatic attenuation artifact.  Ejection fraction 60%    Cardiac Imaging:  CT cardiac scoring March 5, 2021  FINDINGS:  The score and distribution of calcium in the coronary arteries is as  follows:     LM            0  LAD           0  LCx           0  RCA           0     Total          0        The heart size is normal and there is no pericardial effusion. There  is dilatation of the ascending aorta measuring 4.2 cm in diameter.  This appears slightly increased from the prior exam.    Assessment/Plan   Eligio was seen today for follow-up.  Diagnoses and all orders for this visit:  Paroxysmal atrial fibrillation (Multi) (Primary)  -     ECG 12 lead (Clinic Performed)  Benign essential hypertension  Pure hypercholesterolemia  -     Lipid panel; Future  Morbid obesity (Multi)  Aneurysm of ascending aorta without rupture (CMS-HCC)  -     CT chest wo IV contrast; Future        In summary Mr. Hogue is a pleasant 64-year-old white male with a past medical history significant for persistent atrial fibrillation s/p ablation, hypertension, hyperlipidemia with a CT calcium score of 0 in 2021, type II non-insulin-requiring diabetes, morbid obesity, and mildly dilated ascending aorta. He is overall feeling better. He has a more chronic stable dyspnea on exertion. I suspect dyspnea on exertion is multifactorial secondary to deconditioning and obesity. He is euvolemic by clinical exam. His blood pressure is at goal today. I have encouraged him to modify diet, increase physical activity, and lose weight. He will follow up with sleep medicine as scheduled. His thigh weakness improved on  lower dose Rosuvastatin. I have ordered fasting lipid panel. He is scheduled for colonoscopy next week. I have instructed him he can hold Eliquis for 2 days prior to procedure and resume when safe to do as instructed by performing physician. He will continue current cardiovascular medications. He will follow up in 2 months. I have ordered CT of chest without contrast in July 2025 for surveillance of aortic aneurysm.                     Orders:  No orders of the defined types were placed in this encounter.     Followup Appts:  Future Appointments   Date Time Provider Department Center   3/6/2025  4:00 PM Autumn Rodriguez APRN-CNP [id number] [facility]   3/10/2025  1:15 PM Vernon Estrada MD [id number] [facility]   3/11/2025  3:00 PM Nickolas Bajwa MD [id number] [facility]   9/2/2025  9:20 AM Zeyad Hernandez MD [id number] [facility]           ____________________________________________________________  MELBA Jennings-CNP  [facility]  [facility]

## 2025-02-03 LAB
AP SUMMARY REPORT: NORMAL
SCAN RESULT: NORMAL

## 2025-02-18 ENCOUNTER — TELEPHONE (OUTPATIENT)
Dept: RADIATION ONCOLOGY | Facility: HOSPITAL | Age: 65
End: 2025-02-18
Payer: COMMERCIAL

## 2025-02-18 NOTE — TELEPHONE ENCOUNTER
Discussed Decipher of 0.93. Recommend definitive treatment. He would like to proceed with RT. He has an appointment with Dr. Hendricks on 3/4/25 to discuss urinary outlet procedure, given that he continues to experience urinary retention. If he proceeds with outlet procedure, plan to radiate 3m afterwards. If he does not proceed with outlet procedure, we will coordinate spacer gel + fiducials at this time, followed by treatment planning.    Esthela Saleh MD PhD  , Radiation Oncology

## 2025-02-28 ENCOUNTER — LAB (OUTPATIENT)
Dept: LAB | Facility: HOSPITAL | Age: 65
End: 2025-02-28
Payer: COMMERCIAL

## 2025-02-28 DIAGNOSIS — C61 PROSTATE CANCER (MULTI): ICD-10-CM

## 2025-02-28 LAB — PSA SERPL-MCNC: 2.22 NG/ML

## 2025-02-28 PROCEDURE — 84153 ASSAY OF PSA TOTAL: CPT

## 2025-03-01 LAB
CHOLEST SERPL-MCNC: 93 MG/DL
CHOLEST/HDLC SERPL: 2.8 (CALC)
HDLC SERPL-MCNC: 33 MG/DL
LDLC SERPL CALC-MCNC: 37 MG/DL (CALC)
NONHDLC SERPL-MCNC: 60 MG/DL (CALC)
TRIGL SERPL-MCNC: 144 MG/DL

## 2025-03-04 ENCOUNTER — APPOINTMENT (OUTPATIENT)
Dept: UROLOGY | Facility: CLINIC | Age: 65
End: 2025-03-04
Payer: COMMERCIAL

## 2025-03-04 VITALS — TEMPERATURE: 97.7 F

## 2025-03-04 DIAGNOSIS — Z79.2 NEED FOR PROPHYLACTIC ANTIBIOTIC: ICD-10-CM

## 2025-03-04 DIAGNOSIS — N40.1 BENIGN PROSTATIC HYPERPLASIA WITH INCOMPLETE BLADDER EMPTYING: Primary | ICD-10-CM

## 2025-03-04 DIAGNOSIS — C61 MALIGNANT NEOPLASM OF PROSTATE (MULTI): ICD-10-CM

## 2025-03-04 DIAGNOSIS — R39.14 BENIGN PROSTATIC HYPERPLASIA WITH INCOMPLETE BLADDER EMPTYING: Primary | ICD-10-CM

## 2025-03-04 PROCEDURE — 51798 US URINE CAPACITY MEASURE: CPT | Performed by: UROLOGY

## 2025-03-04 PROCEDURE — 1159F MED LIST DOCD IN RCRD: CPT | Performed by: UROLOGY

## 2025-03-04 PROCEDURE — 4010F ACE/ARB THERAPY RXD/TAKEN: CPT | Performed by: UROLOGY

## 2025-03-04 PROCEDURE — 99214 OFFICE O/P EST MOD 30 MIN: CPT | Performed by: UROLOGY

## 2025-03-04 PROCEDURE — 1126F AMNT PAIN NOTED NONE PRSNT: CPT | Performed by: UROLOGY

## 2025-03-04 PROCEDURE — 1036F TOBACCO NON-USER: CPT | Performed by: UROLOGY

## 2025-03-04 RX ORDER — SULFAMETHOXAZOLE AND TRIMETHOPRIM 800; 160 MG/1; MG/1
1 TABLET ORAL ONCE
Qty: 1 TABLET | Refills: 0 | Status: SHIPPED | OUTPATIENT
Start: 2025-03-04 | End: 2025-03-04

## 2025-03-04 ASSESSMENT — PAIN SCALES - GENERAL: PAINLEVEL_OUTOF10: 0-NO PAIN

## 2025-03-04 NOTE — PROGRESS NOTES
Subjective     Eligio Hogue is a 64 y.o. male with history of Afib s/p cardiac radiofrequency ablation (9/12/24), urinary retention, incomplete bladder emptying, gross hematuria and UTI's. MRI Prostate (9/20/24) showed 44.8g, a PI-RADS 4 lesion s/p prostate biopsy on 12/17/2024 with pathology showing prostatic adenocarcinoma, Michael 7 prostate cancer. He presents today to discuss HoLEP prior to starting radiation therapy. Patient has occasional urinary hesitancy with weak stream, not bothersome on Tamsulosin. Denies any recent gross hematuria, fevers, chills, intractable flank or abdominal pain, nausea or vomiting.          Past Medical History:   Diagnosis Date    A-fib (Multi)     Atrial flutter (Multi) 09/26/2023    Benign essential hypertension 06/10/2024    BPH (benign prostatic hyperplasia)     Colon polyp     COPD (chronic obstructive pulmonary disease) (Multi)     DM2 (diabetes mellitus, type 2) (Multi)     GERD (gastroesophageal reflux disease) 06/10/2024    Hyperlipidemia 06/10/2024    Hypothyroidism 06/10/2024    Irregular heart beat     Other chest pain 02/16/2021    Chest tightness or pressure    Personal history of other benign neoplasm     History of benign neoplasm of pharynx    Sleep apnea     does not use device    Suspected sleep apnea 06/14/2024    Urinary tract infection      Past Surgical History:   Procedure Laterality Date    ABLATION OF DYSRHYTHMIC FOCUS      CARDIAC CATHETERIZATION  9/12/24    CARDIAC ELECTROPHYSIOLOGY PROCEDURE N/A 09/12/2024    Procedure: Ablation A-Fib;  Surgeon: Nickolas Bajwa MD;  Location: [facility] Cardiac [facility] Lab;  Service: Electrophysiology;  Laterality: N/A;    COLONOSCOPY      OTHER SURGICAL HISTORY      Excision Of Tracheal Tumor    OTHER SURGICAL HISTORY  08/09/2018    Oral Surgery Tooth Extraction [place] Tooth     Family History   Problem Relation Name Age of Onset    Heart disease Mother Lupe Dorothy     Kidney disease Mother Lupe De La Cruz     Diabetes Sister Heide  Monroeheimer     Endometrial cancer Sister Heide     Diabetes type II Sister Heide     Colon cancer Sister Heide     Diabetes Brother Emile Hogue     Alcohol abuse Brother Emile Hogue      Current Outpatient Medications   Medication Sig Dispense Refill    apixaban (Eliquis) 5 mg tablet Take 1 tablet (5 mg) by mouth 2 times a day. 180 tablet 3    fluticasone (Flonase) 50 mcg/actuation nasal spray Administer 1 spray into each nostril once daily. Shake gently. Before first use, prime pump. After use, clean tip and replace cap. 16 g 12    losartan (Cozaar) 50 mg tablet Take 1 tablet (50 mg) by mouth once daily. 90 tablet 1    metFORMIN  mg 24 hr tablet Take 1 tablet (500 mg) by mouth 2 times daily (morning and late afternoon). 180 tablet 1    metoprolol tartrate (Lopressor) 25 mg tablet Take 1 tablet (25 mg) by mouth 2 times a day. 180 tablet 1    rosuvastatin (Crestor) 20 mg tablet Take 0.5 tablets (10 mg) by mouth once daily.      tamsulosin (Flomax) 0.4 mg 24 hr capsule Take 1 capsule (0.4 mg) by mouth once daily. 30 capsule 11     No current facility-administered medications for this visit.     Allergies   Allergen Reactions    Penicillins Other     Blisters in mouth      Omeprazole Rash     Patient unsure of reaction     Social History     Socioeconomic History    Marital status:      Spouse name: Not on file    Number of children: Not on file    Years of education: Not on file    Highest education level: Not on file   Occupational History    Not on file   Tobacco Use    Smoking status: Former     Types: Cigarettes    Smokeless tobacco: Never    Tobacco comments:     Patient denies adverse reactions with anesthesia, patient denies family issues with anesthesia.    Vaping Use    Vaping status: Never Used   Substance and Sexual Activity    Alcohol use: Yes     Comment: rarely    Drug use: Never    Sexual activity: Yes   Other Topics Concern    Not on file   Social History Narrative    Not  on file     Social Drivers of Health     Financial Resource Strain: Not on file   Food Insecurity: Not on file   Transportation Needs: Not on file   Physical Activity: Not on file   Stress: Not on file   Social Connections: Not on file   Intimate Partner Violence: Not on file   Housing Stability: Not on file       Review of Systems  Pertinent items are noted in HPI.    Objective       Lab Review  Lab Results   Component Value Date    WBC 8.4 10/25/2024    RBC 4.72 10/25/2024    HGB 13.2 (L) 10/25/2024    HCT 40.6 (L) 10/25/2024     10/25/2024      Lab Results   Component Value Date    BUN 20 10/25/2024    CREATININE 1.17 10/25/2024      Lab Results   Component Value Date    PSA 2.19 06/17/2024     IPSS 6 and 1  PVR 1516 ml      Assessment/Plan   Diagnoses and all orders for this visit:  Benign prostatic hyperplasia with incomplete bladder emptying  -     Measure post void residual  -     Cystourethroscopy; Future  Malignant neoplasm of prostate (Multi)  Need for prophylactic antibiotic    Urinary retention with incomplete bladder emptying   Michael 7 prostate cancer      We will proceed with cystoscopy in anticipation of an outlet procedure prior to initiation of RT.    The risks of cystoscopy were discussed with the patient in great detail, including risk of hematuria, UTI and discomfort. Antibiotic will be prescribed to be taken 1 hour prior to the procedure. Patient agrees to proceed.       All questions were answered to the patient's satisfaction. Patient agrees with the plan and wishes to proceed. Follow-up will be scheduled appropriately.     E&M visit today is associated with current or anticipated ongoing medical care services related to a patient's single, serious condition or a complex condition.    Scribed for Dr. Hendricks by Megha Sahni. I , Dr Hendricks, have personally reviewed and agreed with the information entered by the Virtual Scribe.

## 2025-03-06 ENCOUNTER — APPOINTMENT (OUTPATIENT)
Dept: SLEEP MEDICINE | Facility: CLINIC | Age: 65
End: 2025-03-06
Payer: COMMERCIAL

## 2025-03-10 ENCOUNTER — APPOINTMENT (OUTPATIENT)
Dept: PRIMARY CARE | Facility: CLINIC | Age: 65
End: 2025-03-10
Payer: COMMERCIAL

## 2025-03-10 VITALS — SYSTOLIC BLOOD PRESSURE: 126 MMHG | DIASTOLIC BLOOD PRESSURE: 72 MMHG

## 2025-03-10 DIAGNOSIS — I10 PRIMARY HYPERTENSION: ICD-10-CM

## 2025-03-10 DIAGNOSIS — I48.0 PAROXYSMAL ATRIAL FIBRILLATION (MULTI): Primary | ICD-10-CM

## 2025-03-10 DIAGNOSIS — R97.20 ELEVATED PSA: ICD-10-CM

## 2025-03-10 DIAGNOSIS — C61 MALIGNANT NEOPLASM OF PROSTATE (MULTI): ICD-10-CM

## 2025-03-10 DIAGNOSIS — G47.33 OSA ON CPAP: ICD-10-CM

## 2025-03-10 DIAGNOSIS — E11.69 TYPE 2 DIABETES MELLITUS WITH OTHER SPECIFIED COMPLICATION, WITHOUT LONG-TERM CURRENT USE OF INSULIN: ICD-10-CM

## 2025-03-10 DIAGNOSIS — E78.2 MIXED HYPERLIPIDEMIA: ICD-10-CM

## 2025-03-10 DIAGNOSIS — C61 PROSTATE CANCER (MULTI): ICD-10-CM

## 2025-03-10 PROCEDURE — 4010F ACE/ARB THERAPY RXD/TAKEN: CPT | Performed by: STUDENT IN AN ORGANIZED HEALTH CARE EDUCATION/TRAINING PROGRAM

## 2025-03-10 PROCEDURE — 3078F DIAST BP <80 MM HG: CPT | Performed by: STUDENT IN AN ORGANIZED HEALTH CARE EDUCATION/TRAINING PROGRAM

## 2025-03-10 PROCEDURE — 1036F TOBACCO NON-USER: CPT | Performed by: STUDENT IN AN ORGANIZED HEALTH CARE EDUCATION/TRAINING PROGRAM

## 2025-03-10 PROCEDURE — 99214 OFFICE O/P EST MOD 30 MIN: CPT | Performed by: STUDENT IN AN ORGANIZED HEALTH CARE EDUCATION/TRAINING PROGRAM

## 2025-03-10 PROCEDURE — 3074F SYST BP LT 130 MM HG: CPT | Performed by: STUDENT IN AN ORGANIZED HEALTH CARE EDUCATION/TRAINING PROGRAM

## 2025-03-10 RX ORDER — METFORMIN HYDROCHLORIDE 500 MG/1
500 TABLET, EXTENDED RELEASE ORAL
Qty: 180 TABLET | Refills: 1 | Status: SHIPPED | OUTPATIENT
Start: 2025-03-10 | End: 2025-03-10 | Stop reason: SDUPTHER

## 2025-03-10 RX ORDER — METFORMIN HYDROCHLORIDE 500 MG/1
1000 TABLET, EXTENDED RELEASE ORAL
Qty: 360 TABLET | Refills: 1 | Status: SHIPPED | OUTPATIENT
Start: 2025-03-10

## 2025-03-10 NOTE — PROGRESS NOTES
Subjective   Patient ID: Eligio Hogue is a 65 y.o. male who presents for Follow-up.    HPI     Pt presenting for follow up visit. No acute complaints.    He is getting ready to undergo treatment for prostate cancer.    He has a few question today.    Review of Systems  12-point ROS reviewed and was negative unless otherwise noted in HPI.    Objective   /72     Physical Exam  GEN: conversant, NAD  HEENT: PERRL, EOMI, MMM  NECK: supple, full  CV: S1, S2, RRR  PULM: CTAB  ABD: soft, NT, obese  NEURO: no new gross focal deficits  EXT: no sig LE edema  PSYCH: appropriate affect    Assessment/Plan       #prostatic adenocarcinoma/urinary retention: Follows with urology. Scheduled for cystourethroscopy. Also planning for XRT.    #DM2: Increase metformin to 1000mg BID.      #FLOYD: Following w Sleep Med.     #Afib: Following with Dr Hernandez for Cardiology, S/P Ablation for Afib 9/12/2024. C/b bleed s/p Protamine administration.     #fatigue/Severe obesity, class 3: Advised increased efforts at diet, exercise, weight loss.      #HTN: continue losartan     #HLD: continue statin     #Elevated TSH: T4 within normal limits, will follow     #Health maintenance: colonoscopy completed 1/2025 per GI repeat in 1 year. He says that he has had AAA screening. Advised Shingrix, yearly flu shot. Pneumovax given 2023. Interval records, labs reviewed. Questions addressed.     RTC 4 mo

## 2025-03-11 ENCOUNTER — APPOINTMENT (OUTPATIENT)
Dept: CARDIOLOGY | Facility: CLINIC | Age: 65
End: 2025-03-11
Payer: COMMERCIAL

## 2025-03-11 VITALS
DIASTOLIC BLOOD PRESSURE: 82 MMHG | OXYGEN SATURATION: 95 % | SYSTOLIC BLOOD PRESSURE: 126 MMHG | HEART RATE: 67 BPM | BODY MASS INDEX: 39.17 KG/M2 | WEIGHT: 315 LBS | HEIGHT: 75 IN

## 2025-03-11 DIAGNOSIS — I48.0 PAROXYSMAL ATRIAL FIBRILLATION (MULTI): ICD-10-CM

## 2025-03-11 PROCEDURE — 3008F BODY MASS INDEX DOCD: CPT | Performed by: INTERNAL MEDICINE

## 2025-03-11 PROCEDURE — 99214 OFFICE O/P EST MOD 30 MIN: CPT | Performed by: INTERNAL MEDICINE

## 2025-03-11 PROCEDURE — 1036F TOBACCO NON-USER: CPT | Performed by: INTERNAL MEDICINE

## 2025-03-11 PROCEDURE — 3079F DIAST BP 80-89 MM HG: CPT | Performed by: INTERNAL MEDICINE

## 2025-03-11 PROCEDURE — 99214 OFFICE O/P EST MOD 30 MIN: CPT | Mod: 25 | Performed by: INTERNAL MEDICINE

## 2025-03-11 PROCEDURE — 93010 ELECTROCARDIOGRAM REPORT: CPT | Performed by: INTERNAL MEDICINE

## 2025-03-11 PROCEDURE — 93005 ELECTROCARDIOGRAM TRACING: CPT | Performed by: INTERNAL MEDICINE

## 2025-03-11 PROCEDURE — 4010F ACE/ARB THERAPY RXD/TAKEN: CPT | Performed by: INTERNAL MEDICINE

## 2025-03-11 PROCEDURE — 1126F AMNT PAIN NOTED NONE PRSNT: CPT | Performed by: INTERNAL MEDICINE

## 2025-03-11 PROCEDURE — 1159F MED LIST DOCD IN RCRD: CPT | Performed by: INTERNAL MEDICINE

## 2025-03-11 PROCEDURE — 3074F SYST BP LT 130 MM HG: CPT | Performed by: INTERNAL MEDICINE

## 2025-03-11 ASSESSMENT — ENCOUNTER SYMPTOMS
LOSS OF SENSATION IN FEET: 0
OCCASIONAL FEELINGS OF UNSTEADINESS: 0
DEPRESSION: 0

## 2025-03-11 ASSESSMENT — PAIN SCALES - GENERAL: PAINLEVEL_OUTOF10: 0-NO PAIN

## 2025-03-11 NOTE — PROGRESS NOTES
Referred by Nickolas Mcmanus MD provider found for   Chief Complaint   Patient presents with    Atrial Fibrillation        Eligio Hogue is a 65 y.o. year old male patient with h/o A Fib s/p RFA 6 months ago. He is off Flecainide. Presents for follow up.     PMHx/PSHx: As above    FamHx: unremarkable     Allergies:  Allergies   Allergen Reactions    Penicillins Other     Blisters in mouth      Omeprazole Rash     Patient unsure of reaction        Review of Systems    Constitutional: not feeling tired.   Eyes: no eyesight problems.   ENT: no hearing loss and no nosebleeds.   Cardiovascular: no intermittent leg claudication and as noted in HPI.   Respiratory: no chronic cough and no shortness of breath.   Gastrointestinal: no change in bowel habits and no blood in stools.   Genitourinary: no urinary frequency and no hematuria.   Skin: no skin rashes.   Neurological: no seizures and no frequent falls.   Psychiatric: no depression and not suicidal.   All other systems have been reviewed and are negative for complaint.     Outpatient Medications:  Current Outpatient Medications   Medication Instructions    apixaban (ELIQUIS) 5 mg, oral, 2 times daily    fluticasone (Flonase) 50 mcg/actuation nasal spray 1 spray, Each Nostril, Daily, Shake gently. Before first use, prime pump. After use, clean tip and replace cap.    losartan (COZAAR) 50 mg, oral, Daily    metFORMIN XR (GLUCOPHAGE-XR) 1,000 mg, oral, 2 times daily (morning and late afternoon)    metoprolol tartrate (LOPRESSOR) 25 mg, oral, 2 times daily    rosuvastatin (CRESTOR) 10 mg, oral, Daily    tamsulosin (FLOMAX) 0.4 mg, oral, Daily         Last Recorded Vitals:      12/17/2024     1:15 PM 12/17/2024     1:26 PM 12/17/2024     1:47 PM 1/23/2025     9:06 AM 3/4/2025    12:54 PM 3/10/2025    12:53 PM 3/11/2025     3:01 PM   Vitals   Systolic 123 122 131 118  126 126   Diastolic 75 73 74 81  72 82   BP Location Right arm   Right arm   Left arm   Heart Rate 63 60 62  "67   67   Temp 36.6 °C (97.9 °F) 36.6 °C (97.9 °F) 36.4 °C (97.5 °F)  36.5 °C (97.7 °F)     Resp 15 16 16 18      Height    1.905 m (6' 3\")   1.905 m (6' 3\")   Weight (lb)    359   361   BMI    44.87 kg/m2   45.12 kg/m2   BSA (m2)    2.94 m2   2.95 m2   Visit Report    Report Report Report Report    Visit Vitals  /82 (BP Location: Left arm, Patient Position: Sitting, BP Cuff Size: Adult long)   Pulse 67   Ht 1.905 m (6' 3\")   Wt (!) 164 kg (361 lb)   SpO2 95%   BMI 45.12 kg/m²   Smoking Status Former   BSA 2.95 m²        Physical Exam:  Constitutional: alert and in no acute distress.   Eyes: no erythema, swelling or discharge from the eye .   Neck: neck is supple, symmetric, trachea midline, no masses  and no thyromegaly .   Pulmonary: no increased work of breathing or signs of respiratory distress  and lungs clear to auscultation.    Cardiovascular: carotid pulses 2+ bilaterally with no bruit , JVP was normal, no thrills , regular rhythm, normal S1 and S2, no murmurs , pedal pulses 2+ bilaterally  and no edema .   Abdomen: abdomen non-tender, no masses  and no hepatomegaly .   Skin: skin warm and dry, normal skin turgor .   Psychiatric judgment and insight is normal  and oriented to person, place and time .        Assessment/Plan   Problem List Items Addressed This Visit             ICD-10-CM    Paroxysmal atrial fibrillation (Multi) I48.0    Relevant Orders    ECG 12 lead (Clinic Performed)       Eligio Hogue is a 65 y.o. year old male patient with h/o A Fib s/p RFA 6 months ago. He is off Flecainide. Presents for follow up.   His current ECG shows NSR with 1 PVC, narrow QRS and HR of 67 bpm. Recent echocardiogram showed a preserved LVEF, Ao aneurism which is on follow up by his cardiologist. The patient reports feeling well and denies symptoms. Will continue his current medications and follow up in 6 months (1 year from the ablation.         Nickolas Bajwa MD  Cardiac Electrophysiology      Thank you very " much for allowing me to participate in the care of this pleasant patient. Please do not hesitate to contact me with any further questions or concerns regarding his care.    **Disclaimer: This note was dictated by speech recognition, and every effort has been made to prevent any error in transcription, however minor errors may be present**

## 2025-03-13 LAB
ATRIAL RATE: 67 BPM
P AXIS: 59 DEGREES
P OFFSET: 165 MS
P ONSET: 125 MS
PR INTERVAL: 186 MS
Q ONSET: 218 MS
QRS COUNT: 11 BEATS
QRS DURATION: 70 MS
QT INTERVAL: 392 MS
QTC CALCULATION(BAZETT): 414 MS
QTC FREDERICIA: 407 MS
R AXIS: -18 DEGREES
T AXIS: 25 DEGREES
T OFFSET: 414 MS
VENTRICULAR RATE: 67 BPM

## 2025-03-31 NOTE — PROGRESS NOTES
Subjective     Eligio Hogue is a 65 y.o. male with history of Afib s/p cardiac radiofrequency ablation (9/12/24), urinary retention, incomplete bladder emptying, gross hematuria and UTI's. MRI Prostate (9/20/24) showed 44.8g, a PI-RADS 4 lesion s/p prostate biopsy on 12/17/2024 with pathology showing prostatic adenocarcinoma, Michael 7 prostate cancer. He presents today for cystoscopy in anticipation of an outlet procedure. Denies any recent gross hematuria, fevers, chills, intractable flank or abdominal pain, nausea or vomiting.          Past Medical History:   Diagnosis Date    A-fib (Multi)     Atrial flutter (Multi) 09/26/2023    Benign essential hypertension 06/10/2024    BPH (benign prostatic hyperplasia)     Colon polyp     COPD (chronic obstructive pulmonary disease) (Multi)     DM2 (diabetes mellitus, type 2) (Multi)     GERD (gastroesophageal reflux disease) 06/10/2024    Hyperlipidemia 06/10/2024    Hypothyroidism 06/10/2024    Irregular heart beat     Other chest pain 02/16/2021    Chest tightness or pressure    Personal history of other benign neoplasm     History of benign neoplasm of pharynx    Sleep apnea     does not use device    Suspected sleep apnea 06/14/2024    Urinary tract infection      Past Surgical History:   Procedure Laterality Date    ABLATION OF DYSRHYTHMIC FOCUS      CARDIAC CATHETERIZATION  9/12/24    CARDIAC ELECTROPHYSIOLOGY PROCEDURE N/A 09/12/2024    Procedure: Ablation A-Fib;  Surgeon: Nickolas Bajwa MD;  Location: [facility] Cardiac Cath Lab;  Service: Electrophysiology;  Laterality: N/A;    COLONOSCOPY      OTHER SURGICAL HISTORY      Excision Of Tracheal Tumor    OTHER SURGICAL HISTORY  08/09/2018    Oral Surgery Tooth Extraction [place] [facility]     Family History   Problem Relation Name Age of Onset    Heart disease Mother Lupe De La Cruz     Kidney disease Mother Lupe De La Cruz     Diabetes Sister Heide Lightinheimer     Endometrial cancer Sister Heide     Diabetes type II Sister  Heide     Colon cancer Sister Heide     Diabetes Brother Emile Hogue     Alcohol abuse Brother Emile Hogue      Current Outpatient Medications   Medication Sig Dispense Refill    apixaban (Eliquis) 5 mg tablet Take 1 tablet (5 mg) by mouth 2 times a day. 180 tablet 3    fluticasone (Flonase) 50 mcg/actuation nasal spray Administer 1 spray into each nostril once daily. Shake gently. Before first use, prime pump. After use, clean tip and replace cap. 16 g 12    losartan (Cozaar) 50 mg tablet Take 1 tablet (50 mg) by mouth once daily. 90 tablet 1    metFORMIN  mg 24 hr tablet Take 2 tablets (1,000 mg) by mouth 2 times daily (morning and late afternoon). 360 tablet 1    metoprolol tartrate (Lopressor) 25 mg tablet Take 1 tablet (25 mg) by mouth 2 times a day. 180 tablet 1    rosuvastatin (Crestor) 20 mg tablet Take 0.5 tablets (10 mg) by mouth once daily.      tamsulosin (Flomax) 0.4 mg 24 hr capsule Take 1 capsule (0.4 mg) by mouth once daily. 30 capsule 11     No current facility-administered medications for this visit.     Allergies   Allergen Reactions    Penicillins Other     Blisters in mouth      Omeprazole Rash     Patient unsure of reaction     Social History     Socioeconomic History    Marital status:      Spouse name: Not on file    Number of children: Not on file    Years of education: Not on file    Highest education level: Not on file   Occupational History    Not on file   Tobacco Use    Smoking status: Former     Types: Cigarettes    Smokeless tobacco: Never    Tobacco comments:     Patient denies adverse reactions with anesthesia, patient denies family issues with anesthesia.    Vaping Use    Vaping status: Never Used   Substance and Sexual Activity    Alcohol use: Never     Comment: rarely    Drug use: Never    Sexual activity: Yes   Other Topics Concern    Not on file   Social History Narrative    Not on file     Social Drivers of Health     Financial Resource Strain: Not on  file   Food Insecurity: Not on file   Transportation Needs: Not on file   Physical Activity: Not on file   Stress: Not on file   Social Connections: Not on file   Intimate Partner Violence: Not on file   Housing Stability: Not on file       Review of Systems  Pertinent items are noted in HPI.    Objective   Cystoscopy performed:   Eligio Hogue identified using two (2) forms of identification.  Procedure: diagnostic cystourethroscopy  Indications for procedure: Urinary retention with incomplete bladder emptying   Risks, benefits, and alternatives were discussed in detail.   Patient appears to understand and agrees to proceed.   Patient has signed the procedure consent form.     Cystoscopy findings:  Urethra: normal course and caliber, no evidence of stricture or lesion.  Prostate: non-obstructing.   Bladder:  Extended, trabeculations, no diverticulum, no stone, tumors or other lesions.  Post-cystoscopy: Patient tolerated procedure without complications.    [x] Lateral hypertrophy, with complete channel occlusion.  [] Obstructing median lobe with intravesical protrusion.  [x] Good sphincter coaptation.  [] Normal bladder.         Lab Review  Lab Results   Component Value Date    WBC 8.4 10/25/2024    RBC 4.72 10/25/2024    HGB 13.2 (L) 10/25/2024    HCT 40.6 (L) 10/25/2024     10/25/2024      Lab Results   Component Value Date    BUN 20 10/25/2024    CREATININE 1.17 10/25/2024      Lab Results   Component Value Date    PSA 2.19 06/17/2024         Assessment/Plan   Diagnoses and all orders for this visit:  BPH associated with nocturia  -     Case Request Operating Room: ENUCLEATION, ANATOMICAL, PROSTATE, ENDOSCOPIC; Standing  Other orders  -     Place in outpatient/hospital ambulatory surgery; Standing  -     NPO Diet Except: Sips with meds; Effective now; Standing  -     Height and weight; Standing  -     Insert and maintain peripheral IV; Standing  -     Saline lock IV; Standing  -     POCT Glucose;  Standing  -     Inpatient consult to Respiratory Care; Standing  -     lactated Ringer's infusion  -     ciprofloxacin (Cipro) 400 mg in dextrose 5%  mL      Urinary retention with incomplete bladder emptying   Michael 7 prostate cancer    Given the large volume of the prostate, I recommended proceeding with HoLEP. I discussed that a laser will be used to shell out the obstructing tissue from the inside of the prostate gland. I discussed in detail the risks associated with the HoLEP procedure. As with any surgical procedure, HoLEP surgery has some risks. Such as, incontinence of urine which is common for a few months after surgery but is rarely permanent (about one percent of cases), bleeding after surgery, the need for transfusion or another operation due to bleeding, UTI, damage to the bladder, damage to the ureteral orifice (a small tube where kidney drains into the bladder), prolonged need for a catheter after surgery, or scar tissue in the area of surgery or urethra. Retrograde ejaculation was discussed as a permanent irreversible side effect.      All questions were answered to the patient's satisfaction. Patient agrees with the plan and wishes to proceed. Follow-up will be scheduled appropriately.     E&M visit today is associated with current or anticipated ongoing medical care services related to a patient's single, serious condition or a complex condition.    I spent 40 minutes of dedicated E&M time, including preparation and review of records, notes, and data, time spent with patient/family, and documentation.     Scribed for Dr. Hendricks by Raquel Guillory. I , Dr Hendricks, have personally reviewed and agreed with the information entered by the Virtual Scribe.

## 2025-04-01 ENCOUNTER — APPOINTMENT (OUTPATIENT)
Dept: UROLOGY | Facility: CLINIC | Age: 65
End: 2025-04-01
Payer: COMMERCIAL

## 2025-04-01 VITALS
TEMPERATURE: 97.6 F | SYSTOLIC BLOOD PRESSURE: 145 MMHG | WEIGHT: 315 LBS | BODY MASS INDEX: 39.17 KG/M2 | DIASTOLIC BLOOD PRESSURE: 88 MMHG | HEIGHT: 75 IN | HEART RATE: 64 BPM

## 2025-04-01 DIAGNOSIS — N40.1 BPH ASSOCIATED WITH NOCTURIA: Primary | ICD-10-CM

## 2025-04-01 DIAGNOSIS — R35.1 BPH ASSOCIATED WITH NOCTURIA: Primary | ICD-10-CM

## 2025-04-01 DIAGNOSIS — Z79.2 NEED FOR PROPHYLACTIC ANTIBIOTIC: ICD-10-CM

## 2025-04-01 DIAGNOSIS — Z01.818 PREOP TESTING: ICD-10-CM

## 2025-04-01 PROCEDURE — 52000 CYSTOURETHROSCOPY: CPT | Performed by: UROLOGY

## 2025-04-01 PROCEDURE — 99215 OFFICE O/P EST HI 40 MIN: CPT | Performed by: UROLOGY

## 2025-04-01 RX ORDER — SULFAMETHOXAZOLE AND TRIMETHOPRIM 400; 80 MG/1; MG/1
1 TABLET ORAL 2 TIMES DAILY
Qty: 6 TABLET | Refills: 0 | Status: SHIPPED | OUTPATIENT
Start: 2025-04-01 | End: 2025-04-04

## 2025-04-01 RX ORDER — SODIUM CHLORIDE, SODIUM LACTATE, POTASSIUM CHLORIDE, CALCIUM CHLORIDE 600; 310; 30; 20 MG/100ML; MG/100ML; MG/100ML; MG/100ML
20 INJECTION, SOLUTION INTRAVENOUS CONTINUOUS
OUTPATIENT
Start: 2025-04-01 | End: 2025-04-02

## 2025-04-01 RX ORDER — CIPROFLOXACIN 2 MG/ML
400 INJECTION, SOLUTION INTRAVENOUS ONCE
OUTPATIENT
Start: 2025-04-01 | End: 2025-04-01

## 2025-04-01 ASSESSMENT — PAIN SCALES - GENERAL: PAINLEVEL_OUTOF10: 0-NO PAIN

## 2025-04-11 ENCOUNTER — CLINICAL SUPPORT (OUTPATIENT)
Dept: PREADMISSION TESTING | Facility: HOSPITAL | Age: 65
End: 2025-04-11
Payer: COMMERCIAL

## 2025-04-11 ASSESSMENT — ENCOUNTER SYMPTOMS
COUGH: 0
NAUSEA: 0
SHORTNESS OF BREATH: 0
VOMITING: 0
ABDOMINAL PAIN: 0
WOUND: 0
FEVER: 0
CONFUSION: 0
DIARRHEA: 0
CHILLS: 0
EYE DISCHARGE: 0

## 2025-04-11 NOTE — CPM/PAT NURSE NOTE
CPM/PAT Nurse Note      Name: Eligio Hogue (Eligio Hogue)  /Age: 1960/ y.o.       Past Medical History:   Diagnosis Date    A-fib (Multi)     Atrial flutter (Multi) 2023    Benign essential hypertension 06/10/2024    BPH (benign prostatic hyperplasia)     Colon polyp     COPD (chronic obstructive pulmonary disease) (Multi)     DM2 (diabetes mellitus, type 2) (Multi)     GERD (gastroesophageal reflux disease) 06/10/2024    Hyperlipidemia 06/10/2024    Hypothyroidism 06/10/2024    Irregular heart beat     Other chest pain 2021    Chest tightness or pressure    Personal history of other benign neoplasm     History of benign neoplasm of pharynx    Prostate cancer (Multi)     Sleep apnea     does not use device    Suspected sleep apnea 2024    Urinary tract infection        Past Surgical History:   Procedure Laterality Date    ABLATION OF DYSRHYTHMIC FOCUS      CARDIAC CATHETERIZATION  24    CARDIAC ELECTROPHYSIOLOGY PROCEDURE N/A 2024    Procedure: Ablation A-Fib;  Surgeon: Nickolas Bajwa MD;  Location: [facility] Cardiac Cath Lab;  Service: Electrophysiology;  Laterality: N/A;    COLONOSCOPY      OTHER SURGICAL HISTORY      Excision Of Tracheal Tumor    OTHER SURGICAL HISTORY  2018    Oral Surgery Tooth Extraction [place] [facility]       Patient Sexual activity questions deferred to the physician.    Family History   Problem Relation Name Age of Onset    Heart disease Mother Lupe De La Cruz         valve    Kidney disease Mother Lupe De La Cruz     Diabetes Sister Heide     Endometrial cancer Sister Heide     Diabetes type II Sister Heide     Colon cancer Sister Heide     Diabetes Brother [facility]     Alcohol abuse Brother [facility]     Cancer Brother [facility]        Allergies   Allergen Reactions    Penicillins Other     Blisters in mouth      Omeprazole Rash     Patient unsure of reaction       Prior to Admission medications    Medication Sig Start Date End Date  Taking? Authorizing Provider   apixaban (Eliquis) 5 mg tablet Take 1 tablet (5 mg) by mouth 2 times a day. 7/3/24 7/3/25 Yes Zeyad Hernandez MD   losartan (Cozaar) 50 mg tablet Take 1 tablet (50 mg) by mouth once daily. 9/16/24  Yes Vernon Estrada MD   metFORMIN  mg 24 hr tablet Take 2 tablets (1,000 mg) by mouth 2 times daily (morning and late afternoon). 3/10/25  Yes Vernon Estrada MD   metoprolol tartrate (Lopressor) 25 mg tablet Take 1 tablet (25 mg) by mouth 2 times a day. 6/17/24  Yes Vernon Estrada MD   rosuvastatin (Crestor) 20 mg tablet Take 0.5 tablets (10 mg) by mouth once daily. 11/21/24  Yes ADALBERTO Jennings   tamsulosin (Flomax) 0.4 mg 24 hr capsule Take 1 capsule (0.4 mg) by mouth once daily.  Patient taking differently: Take 1 capsule (0.4 mg) by mouth once daily in the morning. 8/30/24 8/30/25 Yes Beth Hendricks MD   fluticasone (Flonase) 50 mcg/actuation nasal spray Administer 1 spray into each nostril once daily. Shake gently. Before first use, prime pump. After use, clean tip and replace cap.  Patient not taking: Reported on 4/11/2025 9/26/24 9/26/25  Autumn Rodriguez, MELBA-CNP   sulfamethoxazole-trimethoprim (Bactrim) 400-80 mg tablet Take 1 tablet by mouth 2 times a day for 3 days. 4/1/25 4/4/25  Beth Hendricks MD        PAT ROS:   Constitutional:    no fever   no chills  Neuro/Psych:    no confusion  Eyes:    no discharge   use of corrective lenses  Ears:    no ear discharge   no hearing aides  Nose:   Mouth:   Throat:    no throat pain  Neck:   Cardio:    no chest pain  Respiratory:    no cough   no shortness of breath  Endocrine:   GI:    no abdominal pain   no diarrhea   no nausea   no vomiting  :   Musculoskeletal:   Hematologic:    no history of blood transfusion   no blood clots  Skin:   no sores/wound   no rash      DASI Risk Score      Flowsheet Row Pre-Admission Testing from 12/3/2024 in [facility] Pre-Admission Testing from 10/25/2024 in   [facility]   Can you take care of yourself (eat, dress, bathe, or use toilet)?  2.75 filed at 12/03/2024 1108 2.75 filed at 10/25/2024 1029   Can you walk indoors, such as around your house? 1.75 filed at 12/03/2024 1108 1.75 filed at 10/25/2024 1029   Can you walk a block or two on level ground?  2.75 filed at 12/03/2024 1108 2.75 filed at 10/25/2024 1029   Can you climb a flight of stairs or walk up a hill? 5.5 filed at 12/03/2024 1108 5.5 filed at 10/25/2024 1029   Can you run a short distance? 8 filed at 12/03/2024 1108 0 filed at 10/25/2024 1029   Can you do light work around the house like dusting or washing dishes? 2.7 filed at 12/03/2024 1108 2.7 filed at 10/25/2024 1029   Can you do moderate work around the house like vacuuming, sweeping floors or carrying groceries? 3.5 filed at 12/03/2024 1108 3.5 filed at 10/25/2024 1029   Can you do heavy work around the house like scrubbing floors or lifting and moving heavy furniture?  8 filed at 12/03/2024 1108 0 filed at 10/25/2024 1029   Can you do yard work like raking leaves, weeding or pushing a mower? 4.5 filed at 12/03/2024 1108 4.5 filed at 10/25/2024 1029   Can you have sexual relations? 5.25 filed at 12/03/2024 1108 5.25 filed at 10/25/2024 1029   Can you participate in moderate recreational activities like golf, bowling, dancing, doubles tennis or throwing a baseball or football? 6 filed at 12/03/2024 1108 6 filed at 10/25/2024 1029   Can you participate in strenous sports like swimming, singles tennis, football, basketball, or skiing? 0 filed at 12/03/2024 1108 0 filed at 10/25/2024 1029   DASI SCORE 50.7 filed at 12/03/2024 1108 34.7 filed at 10/25/2024 1029   METS Score (Will be calculated only when all the questions are answered) 9 filed at 12/03/2024 1108 7 filed at 10/25/2024 1029          Caprini DVT Assessment      Flowsheet Row Pre-Admission Testing from 12/3/2024 in [facility] Pre-Admission Testing from 10/25/2024  in [facility]   DVT Score (IF A SCORE IS NOT CALCULATING, MUST SELECT A BMI TO COMPLETE) 6 filed at 12/03/2024 1111 7 filed at 10/25/2024 1028   Medical Factors -- Swollen legs filed at 10/25/2024 1028   Surgical Factors Minor surgery planned filed at 12/03/2024 1111 Minor surgery planned filed at 10/25/2024 1028   BMI (BMI MUST BE CHOSEN) 41-50 (Morbid obesity) filed at 12/03/2024 1111 41-50 (Morbid obesity) filed at 10/25/2024 1028          Modified Frailty Index      Flowsheet Row Pre-Admission Testing from 10/25/2024 in [facility]   Non-independent functional status (problems with dressing, bathing, personal grooming, or cooking) 0 filed at 10/25/2024 1030   History of diabetes mellitus  0.0909 filed at 10/25/2024 1030   History of COPD 0 filed at 10/25/2024 1030   History of CHF No filed at 10/25/2024 1030   History of MI 0 filed at 10/25/2024 1030   History of Percutaneous Coronary Intervention, Cardiac Surgery, or Angina No filed at 10/25/2024 1030   Hypertension requiring the use of medication  0.0909 filed at 10/25/2024 1030   Peripheral vascular disease 0 filed at 10/25/2024 1030   Impaired sensorium (cognitive impairement or loss, clouding, or delirium) 0 filed at 10/25/2024 1030   TIA or CVA withouy residual deficit 0 filed at 10/25/2024 1030   Cerebrovascular accident with deficit 0 filed at 10/25/2024 1030   Modified Frailty Index Calculator .1818 filed at 10/25/2024 1030          [id number] Stroke Risk Points  Current as of just now        4 0 to 9 Points:      Last Change:           The [id number] risk score (Lip RYLEY, et al. 2009. © 2010 [employer]) quantifies the risk of stroke for a patient with atrial fibrillation. For patients without atrial fibrillation or under the age of 18 this score appears as N/A. Higher score values generally indicate higher risk of stroke.          Points Metrics   0 Has Congestive Heart Failure:  No      Patients with congestive heart failure get 1 point.    Current as of just now   1 Has Hypertension:  Yes     Patients with hypertension get 1 point.    Current as of just now   1 Age:  65     Patients 65 to 74 years old get 1 point, or patients 75 years and older get 2 points.    Current as of just now   1 Has Diabetes:  Yes     Patients with diabetes get 1 point.    Current as of just now   0 Had Stroke:  No  Had TIA:  No  Had Thromboembolism:  No     Patients who have had a stroke, TIA, or thromboembolism get 2 points.    Current as of just now   1 Has Vascular Disease:  Yes     Patients with vascular disease get 1 point.    Current as of just now   0 Clinically Relevant Sex:  Male     Patients with a clinically relevant sex of Female get 1 point.    Current as of just now             Revised Cardiac Risk Index      Flowsheet Row Pre-Admission Testing from 12/3/2024 in [facility] Pre-Admission Testing from 10/25/2024 in [facility]   High-Risk Surgery (Intraperitoneal, Intrathoracic,Suprainguinal vascular) 0 filed at 12/03/2024 1117 0 filed at 10/25/2024 1030   History of ischemic heart disease (History of MI, History of positive exercuse test, Current chest paint considered due to myocardial ischemia, Use of nitrate therapy, ECG with pathological Q Waves) 0 filed at 12/03/2024 1117 0 filed at 10/25/2024 1030   History of congestive heart failure (pulmonary edemia, bilateral rales or S3 gallop, Paroxysmal nocturnal dyspnea, CXR showing pulmonary vascular redistribution) 0 filed at 12/03/2024 1117 0 filed at 10/25/2024 1030   History of cerebrovascular disease (Prior TIA or stroke) 0 filed at 12/03/2024 1117 0 filed at 10/25/2024 1030   Pre-operative insulin treatment 0 filed at 12/03/2024 1117 0 filed at 10/25/2024 1030   Pre-operative creatinine>2 mg/dl 0 filed at 12/03/2024 1117 0 filed at 10/25/2024 1030   Revised Cardiac Risk Calculator 0 filed at 12/03/2024 1117 0 filed at  10/25/2024 1030          Apfel Simplified Score      Flowsheet Row Pre-Admission Testing from 12/3/2024 in [facility]   Smoking status 1 filed at 12/03/2024 1109   History of motion sickness or PONV  0 filed at 12/03/2024 1109   Use of postoperative opioids 0 filed at 12/03/2024 1109   Gender - Female 0=No filed at 12/03/2024 1109   Apfel Simplified Score Calculator 1 filed at 12/03/2024 1109          Risk Analysis Index Results This Encounter    No data found in the last 10 encounters.       Stop Bang Score      Flowsheet Row Clinical Support from 4/11/2025 in [facility] Admission (Discharged) from 12/17/2024 in [facility] OR with Nolberto Rubio MD   Do you snore loudly? 0 filed at 04/11/2025 1229 0 filed at 12/17/2024 1026   Do you often feel tired or fatigued after your sleep? 0 filed at 04/11/2025 1229 0 filed at 12/17/2024 1026   Has anyone ever observed you stop breathing in your sleep? 1 filed at 04/11/2025 1229 1 filed at 12/17/2024 1026   Do you have or are you being treated for high blood pressure? 1 filed at 04/11/2025 1229 1 filed at 12/17/2024 1026   Recent BMI (Calculated) 45.4 filed at 04/11/2025 1229 44.7 filed at 12/17/2024 1026   Is BMI greater than 35 kg/m2? 1=Yes filed at 04/11/2025 1229 1=Yes filed at 12/17/2024 1026   Age older than 50 years old? 1=Yes filed at 04/11/2025 1229 1=Yes filed at 12/17/2024 1026   Is your neck circumference greater than 17 inches (Male) or 16 inches (Female)? -- 1 filed at 12/17/2024 1026   Gender - Male 1=Yes filed at 04/11/2025 1229 1=Yes filed at 12/17/2024 1026   STOP-BANG Total Score -- 6 filed at 12/17/2024 1026          Prodigy: High Risk  Total Score: 16              Prodigy Age Score      Prodigy Gender Score          ARISCAT Score for Postoperative Pulmonary Complications      Flowsheet Row Pre-Admission Testing from 12/3/2024 in [facility]   Age Calculated Score 3 filed at 12/03/2024  1117   Preoperative SpO2 0 filed at 12/03/2024 1117   Respiratory infection in the last month Either upper or lower (i.e., URI, bronchitis, pneumonia), with fever and antibiotic treatment 0 filed at 12/03/2024 1117   Preoperative anemia (Hgb less than 10 g/dl) 0 filed at 12/03/2024 1117   Surgical incision  0 filed at 12/03/2024 1117   Duration of surgery  0 filed at 12/03/2024 1117          Pasquale Perioperative Risk for Myocardial Infarction or Cardiac Arrest (DHRUV)      Flowsheet Row Pre-Admission Testing from 12/3/2024 in [facility]   Calculated Age Score 1.32 filed at 12/03/2024 1117   Functional Status  0 filed at 12/03/2024 1117   ASA Class  -1.92 filed at 12/03/2024 1117   Creatinine 0 filed at 12/03/2024 1117   Type of Procedure  -0.26 filed at 12/03/2024 1117   DHRUV Total Score  -6.11 filed at 12/03/2024 1117   DHRUV % 0.22 filed at 12/03/2024 1117            Nurse Plan of Action:

## 2025-04-11 NOTE — PERIOPERATIVE NURSING NOTE
PAT nurse screening call completed; chart updated per information provided by patient. Education/ instructions reviewed; patient denied further questions or concerns. Patient aware of upcoming PAT and OR dates.

## 2025-04-20 DIAGNOSIS — E78.5 HYPERLIPIDEMIA, UNSPECIFIED: ICD-10-CM

## 2025-04-20 DIAGNOSIS — I10 PRIMARY HYPERTENSION: ICD-10-CM

## 2025-04-21 RX ORDER — LOSARTAN POTASSIUM 50 MG/1
50 TABLET ORAL DAILY
Qty: 90 TABLET | Refills: 0 | Status: SHIPPED | OUTPATIENT
Start: 2025-04-21

## 2025-04-21 RX ORDER — ROSUVASTATIN CALCIUM 20 MG/1
20 TABLET, COATED ORAL DAILY
Qty: 90 TABLET | Refills: 0 | Status: SHIPPED | OUTPATIENT
Start: 2025-04-21

## 2025-04-28 DIAGNOSIS — R35.1 BPH ASSOCIATED WITH NOCTURIA: ICD-10-CM

## 2025-04-28 DIAGNOSIS — N40.1 BPH ASSOCIATED WITH NOCTURIA: ICD-10-CM

## 2025-04-28 DIAGNOSIS — Z01.818 PREOP TESTING: ICD-10-CM

## 2025-05-02 ENCOUNTER — PRE-ADMISSION TESTING (OUTPATIENT)
Dept: PREADMISSION TESTING | Facility: HOSPITAL | Age: 65
End: 2025-05-02
Payer: COMMERCIAL

## 2025-05-02 VITALS
HEART RATE: 66 BPM | WEIGHT: 315 LBS | BODY MASS INDEX: 39.17 KG/M2 | RESPIRATION RATE: 20 BRPM | OXYGEN SATURATION: 97 % | TEMPERATURE: 98.4 F | HEIGHT: 75 IN | DIASTOLIC BLOOD PRESSURE: 89 MMHG | SYSTOLIC BLOOD PRESSURE: 146 MMHG

## 2025-05-02 DIAGNOSIS — Z01.818 PREOP TESTING: ICD-10-CM

## 2025-05-02 LAB
BNP SERPL-MCNC: 34 PG/ML (ref 0–99)
EST. AVERAGE GLUCOSE BLD GHB EST-MCNC: 171 MG/DL
HBA1C MFR BLD: 7.6 % (ref ?–5.7)

## 2025-05-02 PROCEDURE — 99204 OFFICE O/P NEW MOD 45 MIN: CPT

## 2025-05-02 PROCEDURE — 83036 HEMOGLOBIN GLYCOSYLATED A1C: CPT | Mod: BEALAB

## 2025-05-02 PROCEDURE — 83880 ASSAY OF NATRIURETIC PEPTIDE: CPT | Mod: BEALAB

## 2025-05-02 PROCEDURE — 36415 COLL VENOUS BLD VENIPUNCTURE: CPT

## 2025-05-02 ASSESSMENT — DUKE ACTIVITY SCORE INDEX (DASI)
TOTAL_SCORE: 34.7
CAN YOU PARTICIPATE IN MODERATE RECREATIONAL ACTIVITIES LIKE GOLF, BOWLING, DANCING, DOUBLES TENNIS OR THROWING A BASEBALL OR FOOTBALL: YES
CAN YOU CLIMB A FLIGHT OF STAIRS OR WALK UP A HILL: YES
CAN YOU RUN A SHORT DISTANCE: NO
DASI METS SCORE: 7
CAN YOU PARTICIPATE IN STRENOUS SPORTS LIKE SWIMMING, SINGLES TENNIS, FOOTBALL, BASKETBALL, OR SKIING: NO
CAN YOU DO YARD WORK LIKE RAKING LEAVES, WEEDING OR PUSHING A MOWER: YES
CAN YOU DO MODERATE WORK AROUND THE HOUSE LIKE VACUUMING, SWEEPING FLOORS OR CARRYING GROCERIES: YES
CAN YOU WALK A BLOCK OR TWO ON LEVEL GROUND: YES
CAN YOU DO LIGHT WORK AROUND THE HOUSE LIKE DUSTING OR WASHING DISHES: YES
CAN YOU HAVE SEXUAL RELATIONS: YES
CAN YOU TAKE CARE OF YOURSELF (EAT, DRESS, BATHE, OR USE TOILET): YES
CAN YOU WALK INDOORS, SUCH AS AROUND YOUR HOUSE: YES
CAN YOU DO HEAVY WORK AROUND THE HOUSE LIKE SCRUBBING FLOORS OR LIFTING AND MOVING HEAVY FURNITURE: NO

## 2025-05-02 ASSESSMENT — PAIN SCALES - GENERAL: PAINLEVEL_OUTOF10: 0 - NO PAIN

## 2025-05-02 ASSESSMENT — PAIN - FUNCTIONAL ASSESSMENT: PAIN_FUNCTIONAL_ASSESSMENT: 0-10

## 2025-05-02 NOTE — H&P (VIEW-ONLY)
CPM/PAT Evaluation       Name: Eligio Hogue (Eligio Hogue)  /Age: 1960/65 y.o.     In-Person       Chief Complaint: BPH associated with nocturia     HPI  Patient is an alert and oriented 65 year old male scheduled for a holmium laser enucleation of the prostate on 2025 with Dr. Hendricks for BPH associated with nocturia. He denies pain but does endorse some dysuria and foul smelling urine without fever, chills, or myalgias. PMHX includes BPH, morbid obesity, HTN, HLD, T2DM, prostate cancer,  and PAF. Presents to [facility] today for perioperative risk stratification and optimization.     Medical History[1]    Surgical History[2]    Patient Sexual activity questions deferred to the physician.    Family History[3]    Allergies[4]    Prior to Admission medications    Medication Sig Start Date End Date Taking? Authorizing Provider   apixaban (Eliquis) 5 mg tablet Take 1 tablet (5 mg) by mouth 2 times a day. 7/3/24 7/3/25 Yes Zeyad Hernandez MD   fluticasone (Flonase) 50 mcg/actuation nasal spray Administer 1 spray into each nostril once daily. Shake gently. Before first use, prime pump. After use, clean tip and replace cap. 24 Yes Autumn Rodriguez, APRN-CNP   losartan (Cozaar) 50 mg tablet TAKE 1 TABLET BY MOUTH EVERY DAY 25  Yes Vernon Estrada MD   metFORMIN  mg 24 hr tablet Take 2 tablets (1,000 mg) by mouth 2 times daily (morning and late afternoon). 3/10/25  Yes Vernon Estrada MD   metoprolol tartrate (Lopressor) 25 mg tablet Take 1 tablet (25 mg) by mouth 2 times a day. 24  Yes Vernon Estrada MD   rosuvastatin (Crestor) 20 mg tablet TAKE 1 TABLET BY MOUTH EVERY DAY  Patient taking differently: Take 0.5 tablets (10 mg) by mouth once daily. 25  Yes Vernon Estrada MD   tamsulosin (Flomax) 0.4 mg 24 hr capsule Take 1 capsule (0.4 mg) by mouth once daily.  Patient taking differently: Take 1 capsule (0.4 mg) by mouth once daily in the morning. 24 Yes Beth  MD Eladio       Review of Systems   Constitutional: Negative for chills, decreased appetite, diaphoresis, fever and malaise/fatigue.   Eyes:  Negative for blurred vision and double vision.   Cardiovascular:  Negative for chest pain, claudication, cyanosis, irregular heartbeat, leg swelling, near-syncope and palpitations. Positive for dyspnea on exertion.   Respiratory:  Negative for cough, hemoptysis, shortness of breath and wheezing.    Endocrine: Negative for cold intolerance, heat intolerance, polydipsia, polyphagia and polyuria.   Gastrointestinal:  Negative for abdominal pain, constipation, diarrhea, dysphagia, nausea and vomiting.   Genitourinary:  Negative for bladder incontinence, hematuria, incomplete emptying, nocturia, frequency, pelvic pain and urgency. Positive for dysuria and foul smelling urine.   Neurological:  Negative for headaches, light-headedness, paresthesias, sensory change and weakness.   Psychiatric/Behavioral:  Negative for altered mental status.    Musculoskeletal: Negative for myalgias, arthralgias     Vitals and nursing note reviewed.     Physical exam  Constitutional:       Appearance: Normal appearance. He is Morbidly Obese.   HENT:      Head: Normocephalic and atraumatic.      Mouth/Throat:      Mouth: Mucous membranes are moist.      Pharynx: Oropharynx is clear.   Eyes:      Extraocular Movements: Extraocular movements intact.      Conjunctiva/sclera: Conjunctivae normal.      Pupils: Pupils are equal, round, and reactive to light.   Cardiovascular:      PMI at left midclavicular line. Normal rate. Regular rhythm. Normal S1. Normal S2.       Murmurs: There is no murmur.      No gallop.  No click. No rub.       No audible carotid bruit     2+bilateral lower extremity edema on exam  Pulmonary:      Effort: Pulmonary effort is normal.      Breath sounds: Normal breath sounds.   Abdominal:      General: Abdomen is flat. Bowel sounds are normal.      Palpations: Abdomen is soft and  "non-tender  Musculoskeletal:      Cervical back: Normal range of motion and neck supple.   Skin:     General: Skin is warm and dry.      Capillary Refill: Capillary refill takes less than 2 seconds.   Neurological:      General: No focal deficit present.      Mental Status: He is alert and oriented to person, place, and time. Mental status is at baseline.   Psychiatric:         Mood and Affect: Mood normal.         Behavior: Behavior normal.         Thought Content: Thought content normal.         Judgment: Judgment normal.     Vitals and nursing note reviewed. Physical exam within normal limits.     Visit Vitals  /89   Pulse 66   Temp 36.9 °C (98.4 °F) (Temporal)   Resp 20   Ht 1.905 m (6' 3\")   Wt (!) 163 kg (360 lb 3.2 oz)   SpO2 97%   BMI 45.02 kg/m²   Smoking Status Former   BSA 2.94 m²     DASI Risk Score      Flowsheet Row Pre-Admission Testing from 5/2/2025 in [facility] Pre-Admission Testing from 12/3/2024 in [facility]   Can you take care of yourself (eat, dress, bathe, or use toilet)?  2.75 filed at 05/02/2025 1531 2.75 filed at 12/03/2024 1108   Can you walk indoors, such as around your house? 1.75 filed at 05/02/2025 1531 1.75 filed at 12/03/2024 1108   Can you walk a block or two on level ground?  2.75 filed at 05/02/2025 1531 2.75 filed at 12/03/2024 1108   Can you climb a flight of stairs or walk up a hill? 5.5 filed at 05/02/2025 1531 5.5 filed at 12/03/2024 1108   Can you run a short distance? 0 filed at 05/02/2025 1531 8 filed at 12/03/2024 1108   Can you do light work around the house like dusting or washing dishes? 2.7 filed at 05/02/2025 1531 2.7 filed at 12/03/2024 1108   Can you do moderate work around the house like vacuuming, sweeping floors or carrying groceries? 3.5 filed at 05/02/2025 1531 3.5 filed at 12/03/2024 1108   Can you do heavy work around the house like scrubbing floors or lifting and moving heavy furniture?  0 filed at 05/02/2025 1531 8 " filed at 12/03/2024 1108   Can you do yard work like raking leaves, weeding or pushing a mower? 4.5 filed at 05/02/2025 1531 4.5 filed at 12/03/2024 1108   Can you have sexual relations? 5.25 filed at 05/02/2025 1531 5.25 filed at 12/03/2024 1108   Can you participate in moderate recreational activities like golf, bowling, dancing, doubles tennis or throwing a baseball or football? 6 filed at 05/02/2025 1531 6 filed at 12/03/2024 1108   Can you participate in strenous sports like swimming, singles tennis, football, basketball, or skiing? 0 filed at 05/02/2025 1531 0 filed at 12/03/2024 1108   DASI SCORE 34.7 filed at 05/02/2025 1531 50.7 filed at 12/03/2024 1108   METS Score (Will be calculated only when all the questions are answered) 7 filed at 05/02/2025 1531 9 filed at 12/03/2024 1108          Caprini DVT Assessment      Flowsheet Row Pre-Admission Testing from 5/2/2025 in [facility] Pre-Admission Testing from 12/3/2024 in [facility]   DVT Score (IF A SCORE IS NOT CALCULATING, MUST SELECT A BMI TO COMPLETE) 9 filed at 05/02/2025 1531 6 filed at 12/03/2024 1111   Medical Factors Present cancer, chemotherapy, or previous malignancy filed at 05/02/2025 1531 --   Surgical Factors Major surgery planned, including arthroscopic and laproscopic (1-2 hours) filed at 05/02/2025 1531 Minor surgery planned filed at 12/03/2024 1111   BMI (BMI MUST BE CHOSEN) 41-50 (Morbid obesity) filed at 05/02/2025 1531 41-50 (Morbid obesity) filed at 12/03/2024 1111          Modified Frailty Index      Flowsheet Row Pre-Admission Testing from 5/2/2025 in [facility] Pre-Admission Testing from 10/25/2024 in [facility]   Non-independent functional status (problems with dressing, bathing, personal grooming, or cooking) 0 filed at 05/02/2025 1532 0 filed at 10/25/2024 1030   History of diabetes mellitus  0.0909 filed at 05/02/2025 1532 0.0909 filed at 10/25/2024 1030   History  of COPD 0 filed at 05/02/2025 1532 0 filed at 10/25/2024 1030   History of CHF No filed at 05/02/2025 1532 No filed at 10/25/2024 1030   History of MI -- 0 filed at 10/25/2024 1030   History of Percutaneous Coronary Intervention, Cardiac Surgery, or Angina No filed at 05/02/2025 1532 No filed at 10/25/2024 1030   Hypertension requiring the use of medication  0.0909 filed at 05/02/2025 1532 0.0909 filed at 10/25/2024 1030   Peripheral vascular disease 0 filed at 05/02/2025 1532 0 filed at 10/25/2024 1030   Impaired sensorium (cognitive impairement or loss, clouding, or delirium) 0 filed at 05/02/2025 1532 0 filed at 10/25/2024 1030   TIA or CVA withouy residual deficit 0 filed at 05/02/2025 1532 0 filed at 10/25/2024 1030   Cerebrovascular accident with deficit 0 filed at 05/02/2025 1532 0 filed at 10/25/2024 1030   Modified Frailty Index Calculator -- .1818 filed at 10/25/2024 1030          [id number] Stroke Risk Points  Current as of a minute ago        4 0 to 9 Points:      Last Change:           The [id number] risk score (Antonette HEDRICK, et al. 2009. © 2010 [employer]) quantifies the risk of stroke for a patient with atrial fibrillation. For patients without atrial fibrillation or under the age of 18 this score appears as N/A. Higher score values generally indicate higher risk of stroke.          Points Metrics   0 Has Congestive Heart Failure:  No     Patients with congestive heart failure get 1 point.    Current as of a minute ago   1 Has Hypertension:  Yes     Patients with hypertension get 1 point.    Current as of a minute ago   1 Age:  65     Patients 65 to 74 years old get 1 point, or patients 75 years and older get 2 points.    Current as of a minute ago   1 Has Diabetes:  Yes     Patients with diabetes get 1 point.    Current as of a minute ago   0 Had Stroke:  No  Had TIA:  No  Had Thromboembolism:  No     Patients who have had a stroke, TIA, or thromboembolism get 2 points.     Current as of a minute ago   1 Has Vascular Disease:  Yes     Patients with vascular disease get 1 point.    Current as of a minute ago   0 Clinically Relevant Sex:  Male     Patients with a clinically relevant sex of Female get 1 point.    Current as of a minute ago             Revised Cardiac Risk Index      Flowsheet Row Pre-Admission Testing from 5/2/2025 in [facility] Pre-Admission Testing from 12/3/2024 in [facility]   High-Risk Surgery (Intraperitoneal, Intrathoracic,Suprainguinal vascular) 0 filed at 05/02/2025 1532 0 filed at 12/03/2024 1117   History of ischemic heart disease (History of MI, History of positive exercuse test, Current chest paint considered due to myocardial ischemia, Use of nitrate therapy, ECG with pathological Q Waves) 0 filed at 05/02/2025 1532 0 filed at 12/03/2024 1117   History of congestive heart failure (pulmonary edemia, bilateral rales or S3 gallop, Paroxysmal nocturnal dyspnea, CXR showing pulmonary vascular redistribution) 0 filed at 05/02/2025 1532 0 filed at 12/03/2024 1117   History of cerebrovascular disease (Prior TIA or stroke) 0 filed at 05/02/2025 1532 0 filed at 12/03/2024 1117   Pre-operative insulin treatment 0 filed at 05/02/2025 1532 0 filed at 12/03/2024 1117   Pre-operative creatinine>2 mg/dl 0 filed at 05/02/2025 1532 0 filed at 12/03/2024 1117   Revised Cardiac Risk Calculator 0 filed at 05/02/2025 1532 0 filed at 12/03/2024 1117          Apfel Simplified Score      Flowsheet Row Pre-Admission Testing from 12/3/2024 in [facility]   Smoking status 1 filed at 12/03/2024 1109   History of motion sickness or PONV  0 filed at 12/03/2024 1109   Use of postoperative opioids 0 filed at 12/03/2024 1109   Gender - Female 0=No filed at 12/03/2024 1109   Apfel Simplified Score Calculator 1 filed at 12/03/2024 1109          Risk Analysis Index Results This Encounter    No data found in the last 10 encounters.       Stop  Bang Score      Flowsheet Row Pre-Admission Testing from 5/2/2025 in [facility] Clinical Support from 4/11/2025 in [facility]   Do you snore loudly? 0 filed at 05/02/2025 1503 0 filed at 04/11/2025 1229   Do you often feel tired or fatigued after your sleep? 0 filed at 05/02/2025 1503 0 filed at 04/11/2025 1229   Has anyone ever observed you stop breathing in your sleep? 1 filed at 05/02/2025 1503 1 filed at 04/11/2025 1229   Do you have or are you being treated for high blood pressure? 1 filed at 05/02/2025 1503 1 filed at 04/11/2025 1229   Recent BMI (Calculated) 45 filed at 05/02/2025 1503 45.4 filed at 04/11/2025 1229   Is BMI greater than 35 kg/m2? 1=Yes filed at 05/02/2025 1503 1=Yes filed at 04/11/2025 1229   Age older than 50 years old? 1=Yes filed at 05/02/2025 1503 1=Yes filed at 04/11/2025 1229   Is your neck circumference greater than 17 inches (Male) or 16 inches (Female)? 1 filed at 05/02/2025 1503 --   Gender - Male 1=Yes filed at 05/02/2025 1503 1=Yes filed at 04/11/2025 1229   STOP-BANG Total Score 6 filed at 05/02/2025 1503 --          Prodigy: High Risk  Total Score: 16              Prodigy Age Score      Prodigy Gender Score          ARISCAT Score for Postoperative Pulmonary Complications      Flowsheet Row Pre-Admission Testing from 5/2/2025 in [facility] Pre-Admission Testing from 12/3/2024 in [facility]   Age Calculated Score 3 filed at 05/02/2025 1532 3 filed at 12/03/2024 1117   Preoperative SpO2 0 filed at 05/02/2025 1532 0 filed at 12/03/2024 1117   Respiratory infection in the last month Either upper or lower (i.e., URI, bronchitis, pneumonia), with fever and antibiotic treatment 0 filed at 05/02/2025 1532 0 filed at 12/03/2024 1117   Preoperative anemia (Hgb less than 10 g/dl) 0 filed at 05/02/2025 1532 0 filed at 12/03/2024 1117   Surgical incision  0 filed at 05/02/2025 1532 0 filed at 12/03/2024 1117   Duration of  surgery  0 filed at 05/02/2025 1532 0 filed at 12/03/2024 1117   Emergency Procedure  0 filed at 05/02/2025 1532 --   ARISCAT Total Score  3 filed at 05/02/2025 1532 --          [place] Perioperative Risk for Myocardial Infarction or Cardiac Arrest (DHRUV)      Flowsheet Row Pre-Admission Testing from 5/2/2025 in [facility] Pre-Admission Testing from 12/3/2024 in [facility]   Calculated Age Score 1.34 filed at 05/02/2025 1532 1.32 filed at 12/03/2024 1117   Functional Status  0 filed at 05/02/2025 1532 0 filed at 12/03/2024 1117   ASA Class  -1.92 filed at 05/02/2025 1532 -1.92 filed at 12/03/2024 1117   Creatinine 0 filed at 05/02/2025 1532 0 filed at 12/03/2024 1117   Type of Procedure  -0.26 filed at 05/02/2025 1532 -0.26 filed at 12/03/2024 1117   DHRUV Total Score  -6.09 filed at 05/02/2025 1532 -6.11 filed at 12/03/2024 1117   DHRUV % 0.23 filed at 05/02/2025 1532 0.22 filed at 12/03/2024 1117          Assessment & Plan:    Neuro:  No diagnosis or significant findings on chart review or clinical presentation and evaluation.     HEENT/Airway:  No significant findings on chart review or clinical presentation and evaluation.   History of Obstructive sleep apnea-Noncompliant with CPAP.  STOP-BANG Score-6 points high risk for FOLYD    Mallampati::  IV    TM distance::  >3 FB    Neck ROM::  Full  Dentures-reports full dentures.   Crowns-denies  Implants-denies    Cardiovascular:  No significant findings on chart review or clinical presentation and evaluation.   History of Paroxysmal atrial fibrillation-Managed with Metoprolol and Eliquis. Had a RFA completed in 9/2024 with no reoccurrence. Last EKG SR. HS RRR.   History of Hypertension-Managed with Metoprolol and Losartan. BP in PAT was 146/89.  History of Hyperlipidemia-Managed with Rosuvastatin  Positive for Edema of lower extremities-Presents with 2+ TIFFANIE bilaterally. Patient states it has been present for at least a year. BNP 34.  METS:  7  RCRI: 0 points, 3.9%  risk for postoperative MACE   DHRUV: 0.23% risk for postoperative MACE  EKG -Completed 3/11/2025  Sinus rhythm with occasional Premature ventricular complexes  Low voltage QRS  Borderline ECG  When compared with ECG of 23-JAN-2025 09:12,  Premature ventricular complexes are now Present  Questionable change in QRS axis  Confirmed by Nickolas Bajwa ([id number]) on 3/13/2025 9:55:03 AM      Transthoracic echocardiogram-Completed 12/10/2024  1. Poorly visualized anatomical structures due to suboptimal image quality.  2. Left ventricular ejection fraction is normal, by visual estimate at 60%.  3. Mildly enlarged right ventricle.  4. There is low normal right ventricular systolic function.  5. There is moderate dilatation of the aortic root.  6. The inferior vena cava appears mildly dilated, with IVC inspiratory collapse greater than 50%.    Pulmonary:  No significant findings on chart review or clinical presentation and evaluation.   History of Chronic obstructive pulmonary disease-No current medication. LSCTAB with a resting SPO2 of 97% RA.   ARISCAT: <26 points, 1.6% risk of in-hospital postoperative pulmonary complication  PRODIGY: High risk for opioid induced respiratory depression  Smoking History-He quit smoking approximately 25 years ago. Previously smoked 1 PPD x 20 years.   Discussed smoking cessation and deep breathing handout given    Renal/Urinary:  No diagnosis or significant findings on chart review or clinical presentation and evaluation, however, the patient is at increased risk of perioperative renal complications secondary to age>/= 56, male sex, HTN, and diabetes. Preventative measures include BP monitoring, medication compliance, and hydration management.   BMP-Reviewed, stable  Creatinine-1.16  GFR-69  UC-positive for Aerococcus urinae. Dr Hendricks aware. Being treated with Bactrim.     Endocrine:  No significant findings on chart review or clinical presentation and evaluation.    History of Type 2 diabetes mellitus-Managed with Metformin.   EIG1U-1.6%. Dr Hendricks notified.     Hematologic/Immunology:  No diagnosis or significant findings on chart review or clinical presentation and evaluation.  The patient is not a Jehovah’s witness and will accept blood and blood products if medically indicated.   History of previous blood transfusions No  CBC-Reviewed, stable  HGB-13.2  Caprini Score 9, patient at High for postoperative DVT. Pt supplied education/VTE handout  Anticoagulation use: Yes   Eliquis-OK to DC 3 days preoperatively per cardiology. Patient is aware and has no questions at this time.     Gastrointestinal:   No diagnosis or significant findings on chart review or clinical presentation and evaluation.   Recreational drug use: none  Alcohol use none    Infectious disease:   No diagnosis or significant findings on chart review or clinical presentation and evaluation.     Musculoskeletal:   No diagnosis on chart review or clinical presentation and evaluation.   Positive for morbid obesity-BMI 45.02  JHFRAT score-8 points. moderate risk for falls    Anesthesia:  ASA 3 - Patient with moderate systemic disease with functional limitations  Anticipated anesthesia-general  History of General anesthesia- yes  Complications- PONV  No family history of anesthesia complications    Labs & Imaging ordered:  CBC, BMP, UC, PT/INR, BNP, HBA1C  Nickel/metal allergy-negative  Shellfish allergy-negative    Overall, patient Moderate Risk for the scheduled Low Risk surgery. Discussed with patient medication instructions, NPO guidelines, and any questions or concerns.     Face to face time spent 45 minutes    Preoperative clearance requested from Cardiology  Reason: Cardiac history, OAC instructions  Risk stratification: Low cardiac risk, currently optimized  Provider: Zunilda Oglesby  Recommendations: OK to hold Eliquis 3 days preoperatively  Surgery: HOLEP               [1]   Past Medical  History:  Diagnosis Date    A-fib (Multi)     Atrial flutter (Multi) 09/26/2023    Benign essential hypertension 06/10/2024    BPH (benign prostatic hyperplasia)     Colon polyp     COPD (chronic obstructive pulmonary disease) (Multi)     DM2 (diabetes mellitus, type 2) (Multi)     GERD (gastroesophageal reflux disease) 06/10/2024    Hyperlipidemia 06/10/2024    Hypothyroidism 06/10/2024    Irregular heart beat     Other chest pain 02/16/2021    Chest tightness or pressure    Personal history of other benign neoplasm     History of benign neoplasm of pharynx    Prostate cancer (Multi)     Sleep apnea     does not use device    Suspected sleep apnea 06/14/2024    Urinary tract infection    [2]   Past Surgical History:  Procedure Laterality Date    ABLATION OF DYSRHYTHMIC FOCUS      CARDIAC CATHETERIZATION  9/12/24    CARDIAC ELECTROPHYSIOLOGY PROCEDURE N/A 09/12/2024    Procedure: Ablation A-Fib;  Surgeon: Nickolas Bajwa MD;  Location[facility] Cardiac Cath Lab;  Service: Electrophysiology;  Laterality: N/A;    COLONOSCOPY      OTHER SURGICAL HISTORY      Excision Of Tracheal Tumor    OTHER SURGICAL HISTORY  08/09/2018    Oral Surgery Tooth Extraction [place] Tooth   [3]   Family History  Problem Relation Name Age of Onset    Heart disease Mother Lupe De La Cruz         valve    Kidney disease Mother Lupe De La Cruz     Diabetes Sister Heide     Endometrial cancer Sister Heide     Diabetes type II Sister Heide     Colon cancer Sister Heide     Diabetes Brother Emilemauricio Constantinoham     Alcohol abuse Brother Emile [facility]     Cancer Brother Emilemauricio Hogue    [4]   Allergies  Allergen Reactions    Penicillins Other     Blisters in mouth      Omeprazole Rash     Patient unsure of reaction

## 2025-05-02 NOTE — PREPROCEDURE INSTRUCTIONS
Medication List            Accurate as of May 2, 2025  3:15 PM. Always use your most recent med list.                apixaban 5 mg tablet  Commonly known as: Eliquis  Take 1 tablet (5 mg) by mouth 2 times a day.  Additional Medication Adjustments for Surgery: Other (Comment)  Notes to patient: Follow prescriber preoperative instructions     fluticasone 50 mcg/actuation nasal spray  Commonly known as: Flonase  Administer 1 spray into each nostril once daily. Shake gently. Before first use, prime pump. After use, clean tip and replace cap.  Medication Adjustments for Surgery: Take/Use as prescribed     losartan 50 mg tablet  Commonly known as: Cozaar  TAKE 1 TABLET BY MOUTH EVERY DAY  Medication Adjustments for Surgery: Take last dose 1 day (24 hours) before surgery  Notes to patient: Last dose preoperatively 5/11/2025 AM dose only if applicable. If taken in evening last dose should be taken on 5/10/2025.     metFORMIN  mg 24 hr tablet  Commonly known as: Glucophage-XR  Take 2 tablets (1,000 mg) by mouth 2 times daily (morning and late afternoon).  Medication Adjustments for Surgery: Do Not take on the morning of surgery  Notes to patient: Last dose preoperatively 5/11/2025     metoprolol tartrate 25 mg tablet  Commonly known as: Lopressor  Take 1 tablet (25 mg) by mouth 2 times a day.  Medication Adjustments for Surgery: Take/Use as prescribed     rosuvastatin 20 mg tablet  Commonly known as: Crestor  TAKE 1 TABLET BY MOUTH EVERY DAY  Medication Adjustments for Surgery: Take/Use as prescribed     tamsulosin 0.4 mg 24 hr capsule  Commonly known as: Flomax  Take 1 capsule (0.4 mg) by mouth once daily.  Medication Adjustments for Surgery: Take/Use as prescribed            NPO Instructions:     Do not eat any food or drink liquid after midnight the night before your surgery/procedure.  Additional Instructions:      Seven/Six Days before Surgery:  Review your medication instructions, stop indicated  medications  Five Days before Surgery:  Review your medication instructions, stop indicated medications  Three Days before Surgery:  Review your medication instructions, stop indicated medications  The Day before Surgery:  No smoking or alcohol use 24 hours before surgery  Review your medication instructions, stop indicated medications  You will be contacted regarding the time of your arrival to facility and surgery time  Do not eat any food after Midnight  Day of Surgery:  Review your medication instructions, take indicated medications  If you have diabetes, please check your fasting blood sugar upon awakening.  If fasting blood sugar is <80 mg/dl, drink 100 ml of apple juice, time limit of 2 hours before  Wear  comfortable loose fitting clothing  Do not use moisturizers, creams, lotions or perfume  All jewelry and valuables should be left at home     CONTACT SURGEON'S OFFICE IF YOU DEVELOP:  * Fever = 100.4 F   * New respiratory symptoms (e.g. cough, shortness of breath, respiratory distress, sore throat)  * Recent loss of taste or smell  *Flu like symptoms such as headache, fatigue or gastrointestinal symptoms  * You develop any open sores, shingles, burning or painful urination   AND/OR:  * You no longer wish to have the surgery.  * Any other personal circumstances change that may lead to the need to cancel or defer this surgery.  *You were admitted to any hospital within one week of your planned procedure.     SMOKING:  *Quitting smoking can make a huge difference to your health and recovery from surgery.    *If you need help with quitting, call 0-393-QUIT-NOW.     THE DAY BEFORE SURGERY:  *Do not eat any food after midnight the night before your surgery.   SURGICAL TIME:  *You will be contacted between 2 p.m. and 3 p.m. the business day before your surgery with your arrival time.  *If you haven't received a call by 3pm, call [phone]  *Scheduled surgery times may change and you will be notified if this  occurs-check your personal voicemail for any updates.     ON THE MORNING OF SURGERY:  *Wear comfortable, loose fitting clothing.   *Do not use moisturizers, creams, lotions or perfume.  *All jewelry and valuables should be left at home.  *Prosthetic devices such as contact lenses, hearing aids, dentures, eyelash extensions, hairpins and body piercing must be removed before surgery.     BRING WITH YOU:  *Photo ID and insurance card  *Current list of medications and allergies  *Pacemaker/Defibrillator/Heart stent cards  *CPAP machine and mask  *Slings/splints/crutches  *Copy of your complete Advanced Directive/DHPOA-if applicable  *Neurostimulator implant remote     PARKING AND ARRIVAL:  *Check in at the Main Entrance desk and let them know you are here for surgery.     IF YOU ARE HAVING OUTPATIENT/SAME DAY SURGERY:  *A responsible adult MUST accompany you at the time of discharge and stay with you for 24 hours after your surgery.  *You may NOT drive yourself home after surgery.  *You may use a taxi or ride sharing service (Lyft, Uber) to return home ONLY if you are accompanied by a friend or family member.  *Instructions for resuming your medications will be provided by your surgeon.     Thank you for coming to Pre Admission testing.      If I have prescribed medication please don't forget to  at your pharmacy.      Any questions about today's visit call [phone] and leave a message in the general mailbox.     Patient instructed to ambulate as soon as possible postoperatively to decrease thromboembolic risk.     Vernon Carrion, APRN-CNP     Thank you for visiting [facility].  If you have any changes to your health condition, please call the surgeons office to alert them and give them details of your symptoms.        Preoperative Fasting Guidelines     Why must I stop eating and drinking near surgery time?  With sedation, food or liquid in your stomach can enter your lungs causing  serious complications  Increases nausea and vomiting     When do I need to stop eating and drinking before my surgery?  Do not eat any food after midnight the night before your surgery/procedure.        Additional Instructions:      The Day before Surgery:  -Review your medication instructions, stop indicated medications  -You will be contacted in the evening regarding the time of your arrival to facility and surgery time     Day of Surgery:  -Review your medication instructions, take indicated medications  -Wear comfortable loose fitting clothing  -Do not use moisturizers, creams, lotions or perfume  -All jewelry and valuables should be left at home                   Preoperative Brain Exercises     What are brain exercises?  A brain exercise is any activity that engages your thinking (cognitive) skills.     What types of activities are considered brain exercises?  Jigsaw puzzles, crossword puzzles, word jumble, memory games, word search, and many more.  Many can be found free online or on your phone via a mobile fatou.     Why should I do brain exercises before my surgery?  More recent research has shown brain exercise before surgery can lower the risk of postoperative delirium (confusion) which can be especially important for older adults.  Patients who did brain exercises for 5 to 10 minutes/day the days before surgery, cut their risk of postoperative delirium in half up to 1 week after surgery.                         [facility]     Preoperative Deep Breathing Exercises     Why it is important to do deep breathing exercises before my surgery?  Deep breathing exercises strengthen your breathing muscles.  This helps you to recover after your surgery and decreases the chance of breathing complications.        How are the deep breathing exercises done?  Sit straight with your back supported.  Breathe in deeply and slowly through your nose. Your lower rib cage should expand and your abdomen  may move forward.  Hold that breath for 3 to 5 seconds.  Breathe out through pursed lips, slowly and completely.  Rest and repeat 10 times every hour while awake.  Rest longer if you become dizzy or lightheaded.                      [facility]     Preoperative Deep Breathing Exercises     Why it is important to do deep breathing exercises before my surgery?  Deep breathing exercises strengthen your breathing muscles.  This helps you to recover after your surgery and decreases the chance of breathing complications.        How are the deep breathing exercises done?  Sit straight with your back supported.  Breathe in deeply and slowly through your nose. Your lower rib cage should expand and your abdomen may move forward.  Hold that breath for 3 to 5 seconds.  Breathe out through pursed lips, slowly and completely.  Rest and repeat 10 times every hour while awake.  Rest longer if you become dizzy or lightheaded.        Patient Information: Incentive Spirometer  What is an incentive spirometer?  An incentive spirometer is a device used before and after surgery to “exercise” your lungs.  It helps you to take deeper breaths to expand your lungs.  Below is an example of a basic incentive spirometer.  The device you receive may differ slightly but they all function the same.    Why do I need to use an incentive spirometer?  Using your incentive spirometer prepares your lungs for surgery and helps prevent lung problems after surgery.  How do I use my incentive spirometer?  When you're using your incentive spirometer, make sure to breathe through your mouth. If you breathe through your nose, the incentive spirometer won't work properly. You can hold your nose if you have trouble.  If you feel dizzy at any time, stop and rest. Try again at a later time.  Follow the steps below:  Set up your incentive spirometer, expand the flexible tubing and connect to the outlet.  Sit upright in a chair or bed. Hold  the incentive spirometer at eye level.   Put the mouthpiece in your mouth and close your lips tightly around it. Slowly breathe out (exhale) completely.  Breathe in (inhale) slowly through your mouth as deeply as you can. As you take a breath, you will see the piston rise inside the large column. While the piston rises, the indicator should move upwards. It should stay in between the 2 arrows (see Figure).  Try to get the piston as high as you can, while keeping the indicator between the arrows.   If the indicator doesn't stay between the arrows, you're breathing either too fast or too slow.  When you get it as high as you can, hold your breath for 10 seconds, or as long as possible. While you're holding your breath, the piston will slowly fall to the base of the spirometer.  Once the piston reaches the bottom of the spirometer, breathe out slowly through your mouth. Rest for a few seconds.  Repeat 10 times. Try to get the piston to the same level with each breath.  Repeat every hour while awake  You can carefully clean the outside of the mouthpiece with an alcohol wipe or soap and water.       Patient and Family Education             Ways You Can Help Prevent Blood Clots                    This handout explains some simple things you can do to help prevent blood clots.      Blood clots are blockages that can form in the body's veins. When a blood clot forms in your deep veins, it may be called a deep vein thrombosis, or DVT for short. Blood clots can happen in any part of the body where blood flows, but they are most common in the arms and legs. If a piece of a blood clot breaks free and travels to the lungs, it is called a pulmonary embolus (PE). A PE can be a very serious problem.         Being in the hospital or having surgery can raise your chances of getting a blood clot because you may not be well enough to move around as much as you normally do.         Ways you can help prevent blood clots in the hospital            Wearing SCDs. SCDs stands for Sequential Compression Devices.   SCDs are special sleeves that wrap around your legs  They attach to a pump that fills them with air to gently squeeze your legs every few minutes.   This helps return the blood in your legs to your heart.   SCDs should only be taken off when walking or bathing.   SCDs may not be comfortable, but they can help save your life.                                            Wearing compression stockings - if your doctor orders them. These special snug fitting stockings gently squeeze your legs to help blood flow.       Walking. Walking helps move the blood in your legs.   If your doctor says it is ok, try walking the halls at least   5 times a day. Ask us to help you get up, so you don't fall.      Taking any blood thinning medicines your doctor orders.        Page 1 of 2            [facility]; 3/23   Ways you can help prevent blood clots at home         Wearing compression stockings - if your doctor orders them. ? Walking - to help move the blood in your legs.       Taking any blood thinning medicines your doctor orders.      Signs of a blood clot or PE        Tell your doctor or nurse know right away if you have of the problems listed below.    If you are at home, seek medical care right away. Call 911 for chest pain or problems breathing.          Signs of a blood clot (DVT) - such as pain,  swelling, redness or warmth in your arm or leg      Signs of a pulmonary embolism (PE) - such as chest pain or feeling short of breath

## 2025-05-02 NOTE — CPM/PAT H&P
CPM/PAT Evaluation       Name: Eligio Hogue (Eligio Hogue)  /Age: 1960/65 y.o.     In-Person       Chief Complaint: BPH associated with nocturia     HPI  Patient is an alert and oriented 65 year old male scheduled for a holmium laser enucleation of the prostate on 2025 with Dr. Hendricks for BPH associated with nocturia. He denies pain but does endorse some dysuria and foul smelling urine without fever, chills, or myalgias. PMHX includes BPH, morbid obesity, HTN, HLD, T2DM, prostate cancer,  and PAF. Presents to [facility] today for perioperative risk stratification and optimization.     Medical History[1]    Surgical History[2]    Patient Sexual activity questions deferred to the physician.    Family History[3]    Allergies[4]    Prior to Admission medications    Medication Sig Start Date End Date Taking? Authorizing Provider   apixaban (Eliquis) 5 mg tablet Take 1 tablet (5 mg) by mouth 2 times a day. 7/3/24 7/3/25 Yes Zeyad Hernandez MD   fluticasone (Flonase) 50 mcg/actuation nasal spray Administer 1 spray into each nostril once daily. Shake gently. Before first use, prime pump. After use, clean tip and replace cap. 24 Yes Autumn Rodriguez, APRN-CNP   losartan (Cozaar) 50 mg tablet TAKE 1 TABLET BY MOUTH EVERY DAY 25  Yes Vernon Estrada MD   metFORMIN  mg 24 hr tablet Take 2 tablets (1,000 mg) by mouth 2 times daily (morning and late afternoon). 3/10/25  Yes Vernon Estrada MD   metoprolol tartrate (Lopressor) 25 mg tablet Take 1 tablet (25 mg) by mouth 2 times a day. 24  Yes Vernon Estrada MD   rosuvastatin (Crestor) 20 mg tablet TAKE 1 TABLET BY MOUTH EVERY DAY  Patient taking differently: Take 0.5 tablets (10 mg) by mouth once daily. 25  Yes Vernon Estrada MD   tamsulosin (Flomax) 0.4 mg 24 hr capsule Take 1 capsule (0.4 mg) by mouth once daily.  Patient taking differently: Take 1 capsule (0.4 mg) by mouth once daily in the morning. 24 Yes Beth  MD Eladio       Review of Systems   Constitutional: Negative for chills, decreased appetite, diaphoresis, fever and malaise/fatigue.   Eyes:  Negative for blurred vision and double vision.   Cardiovascular:  Negative for chest pain, claudication, cyanosis, irregular heartbeat, leg swelling, near-syncope and palpitations. Positive for dyspnea on exertion.   Respiratory:  Negative for cough, hemoptysis, shortness of breath and wheezing.    Endocrine: Negative for cold intolerance, heat intolerance, polydipsia, polyphagia and polyuria.   Gastrointestinal:  Negative for abdominal pain, constipation, diarrhea, dysphagia, nausea and vomiting.   Genitourinary:  Negative for bladder incontinence, hematuria, incomplete emptying, nocturia, frequency, pelvic pain and urgency. Positive for dysuria and foul smelling urine.   Neurological:  Negative for headaches, light-headedness, paresthesias, sensory change and weakness.   Psychiatric/Behavioral:  Negative for altered mental status.    Musculoskeletal: Negative for myalgias, arthralgias     Vitals and nursing note reviewed.     Physical exam  Constitutional:       Appearance: Normal appearance. He is Morbidly Obese.   HENT:      Head: Normocephalic and atraumatic.      Mouth/Throat:      Mouth: Mucous membranes are moist.      Pharynx: Oropharynx is clear.   Eyes:      Extraocular Movements: Extraocular movements intact.      Conjunctiva/sclera: Conjunctivae normal.      Pupils: Pupils are equal, round, and reactive to light.   Cardiovascular:      PMI at left midclavicular line. Normal rate. Regular rhythm. Normal S1. Normal S2.       Murmurs: There is no murmur.      No gallop.  No click. No rub.       No audible carotid bruit     2+bilateral lower extremity edema on exam  Pulmonary:      Effort: Pulmonary effort is normal.      Breath sounds: Normal breath sounds.   Abdominal:      General: Abdomen is flat. Bowel sounds are normal.      Palpations: Abdomen is soft and  "non-tender  Musculoskeletal:      Cervical back: Normal range of motion and neck supple.   Skin:     General: Skin is warm and dry.      Capillary Refill: Capillary refill takes less than 2 seconds.   Neurological:      General: No focal deficit present.      Mental Status: He is alert and oriented to person, place, and time. Mental status is at baseline.   Psychiatric:         Mood and Affect: Mood normal.         Behavior: Behavior normal.         Thought Content: Thought content normal.         Judgment: Judgment normal.     Vitals and nursing note reviewed. Physical exam within normal limits.     Visit Vitals  /89   Pulse 66   Temp 36.9 °C (98.4 °F) (Temporal)   Resp 20   Ht 1.905 m (6' 3\")   Wt (!) 163 kg (360 lb 3.2 oz)   SpO2 97%   BMI 45.02 kg/m²   Smoking Status Former   BSA 2.94 m²     DASI Risk Score      Flowsheet Row Pre-Admission Testing from 5/2/2025 in [facility] Pre-Admission Testing from 12/3/2024 in [facility]   Can you take care of yourself (eat, dress, bathe, or use toilet)?  2.75 filed at 05/02/2025 1531 2.75 filed at 12/03/2024 1108   Can you walk indoors, such as around your house? 1.75 filed at 05/02/2025 1531 1.75 filed at 12/03/2024 1108   Can you walk a block or two on level ground?  2.75 filed at 05/02/2025 1531 2.75 filed at 12/03/2024 1108   Can you climb a flight of stairs or walk up a hill? 5.5 filed at 05/02/2025 1531 5.5 filed at 12/03/2024 1108   Can you run a short distance? 0 filed at 05/02/2025 1531 8 filed at 12/03/2024 1108   Can you do light work around the house like dusting or washing dishes? 2.7 filed at 05/02/2025 1531 2.7 filed at 12/03/2024 1108   Can you do moderate work around the house like vacuuming, sweeping floors or carrying groceries? 3.5 filed at 05/02/2025 1531 3.5 filed at 12/03/2024 1108   Can you do heavy work around the house like scrubbing floors or lifting and moving heavy furniture?  0 filed at 05/02/2025 1531 8 " filed at 12/03/2024 1108   Can you do yard work like raking leaves, weeding or pushing a mower? 4.5 filed at 05/02/2025 1531 4.5 filed at 12/03/2024 1108   Can you have sexual relations? 5.25 filed at 05/02/2025 1531 5.25 filed at 12/03/2024 1108   Can you participate in moderate recreational activities like golf, bowling, dancing, doubles tennis or throwing a baseball or football? 6 filed at 05/02/2025 1531 6 filed at 12/03/2024 1108   Can you participate in strenous sports like swimming, singles tennis, football, basketball, or skiing? 0 filed at 05/02/2025 1531 0 filed at 12/03/2024 1108   DASI SCORE 34.7 filed at 05/02/2025 1531 50.7 filed at 12/03/2024 1108   METS Score (Will be calculated only when all the questions are answered) 7 filed at 05/02/2025 1531 9 filed at 12/03/2024 1108          Caprini DVT Assessment      Flowsheet Row Pre-Admission Testing from 5/2/2025 in [facility] Pre-Admission Testing from 12/3/2024 in [facility]   DVT Score (IF A SCORE IS NOT CALCULATING, MUST SELECT A BMI TO COMPLETE) 9 filed at 05/02/2025 1531 6 filed at 12/03/2024 1111   Medical Factors Present cancer, chemotherapy, or previous malignancy filed at 05/02/2025 1531 --   Surgical Factors Major surgery planned, including arthroscopic and laproscopic (1-2 hours) filed at 05/02/2025 1531 Minor surgery planned filed at 12/03/2024 1111   BMI (BMI MUST BE CHOSEN) 41-50 (Morbid obesity) filed at 05/02/2025 1531 41-50 (Morbid obesity) filed at 12/03/2024 1111          Modified Frailty Index      Flowsheet Row Pre-Admission Testing from 5/2/2025 in [facility] Pre-Admission Testing from 10/25/2024 in [facility]   Non-independent functional status (problems with dressing, bathing, personal grooming, or cooking) 0 filed at 05/02/2025 1532 0 filed at 10/25/2024 1030   History of diabetes mellitus  0.0909 filed at 05/02/2025 1532 0.0909 filed at 10/25/2024 1030   History  of COPD 0 filed at 05/02/2025 1532 0 filed at 10/25/2024 1030   History of CHF No filed at 05/02/2025 1532 No filed at 10/25/2024 1030   History of MI -- 0 filed at 10/25/2024 1030   History of Percutaneous Coronary Intervention, Cardiac Surgery, or Angina No filed at 05/02/2025 1532 No filed at 10/25/2024 1030   Hypertension requiring the use of medication  0.0909 filed at 05/02/2025 1532 0.0909 filed at 10/25/2024 1030   Peripheral vascular disease 0 filed at 05/02/2025 1532 0 filed at 10/25/2024 1030   Impaired sensorium (cognitive impairement or loss, clouding, or delirium) 0 filed at 05/02/2025 1532 0 filed at 10/25/2024 1030   TIA or CVA withouy residual deficit 0 filed at 05/02/2025 1532 0 filed at 10/25/2024 1030   Cerebrovascular accident with deficit 0 filed at 05/02/2025 1532 0 filed at 10/25/2024 1030   Modified Frailty Index Calculator -- .1818 filed at 10/25/2024 1030          [id number] Stroke Risk Points  Current as of a minute ago        4 0 to 9 Points:      Last Change:           The [id number] risk score (Antonette HEDRICK, et al. 2009. © 2010 [employer]) quantifies the risk of stroke for a patient with atrial fibrillation. For patients without atrial fibrillation or under the age of 18 this score appears as N/A. Higher score values generally indicate higher risk of stroke.          Points Metrics   0 Has Congestive Heart Failure:  No     Patients with congestive heart failure get 1 point.    Current as of a minute ago   1 Has Hypertension:  Yes     Patients with hypertension get 1 point.    Current as of a minute ago   1 Age:  65     Patients 65 to 74 years old get 1 point, or patients 75 years and older get 2 points.    Current as of a minute ago   1 Has Diabetes:  Yes     Patients with diabetes get 1 point.    Current as of a minute ago   0 Had Stroke:  No  Had TIA:  No  Had Thromboembolism:  No     Patients who have had a stroke, TIA, or thromboembolism get 2 points.     Current as of a minute ago   1 Has Vascular Disease:  Yes     Patients with vascular disease get 1 point.    Current as of a minute ago   0 Clinically Relevant Sex:  Male     Patients with a clinically relevant sex of Female get 1 point.    Current as of a minute ago             Revised Cardiac Risk Index      Flowsheet Row Pre-Admission Testing from 5/2/2025 in [facility] Pre-Admission Testing from 12/3/2024 in [facility]   High-Risk Surgery (Intraperitoneal, Intrathoracic,Suprainguinal vascular) 0 filed at 05/02/2025 1532 0 filed at 12/03/2024 1117   History of ischemic heart disease (History of MI, History of positive exercuse test, Current chest paint considered due to myocardial ischemia, Use of nitrate therapy, ECG with pathological Q Waves) 0 filed at 05/02/2025 1532 0 filed at 12/03/2024 1117   History of congestive heart failure (pulmonary edemia, bilateral rales or S3 gallop, Paroxysmal nocturnal dyspnea, CXR showing pulmonary vascular redistribution) 0 filed at 05/02/2025 1532 0 filed at 12/03/2024 1117   History of cerebrovascular disease (Prior TIA or stroke) 0 filed at 05/02/2025 1532 0 filed at 12/03/2024 1117   Pre-operative insulin treatment 0 filed at 05/02/2025 1532 0 filed at 12/03/2024 1117   Pre-operative creatinine>2 mg/dl 0 filed at 05/02/2025 1532 0 filed at 12/03/2024 1117   Revised Cardiac Risk Calculator 0 filed at 05/02/2025 1532 0 filed at 12/03/2024 1117          Apfel Simplified Score      Flowsheet Row Pre-Admission Testing from 12/3/2024 in [facility]   Smoking status 1 filed at 12/03/2024 1109   History of motion sickness or PONV  0 filed at 12/03/2024 1109   Use of postoperative opioids 0 filed at 12/03/2024 1109   Gender - Female 0=No filed at 12/03/2024 1109   Apfel Simplified Score Calculator 1 filed at 12/03/2024 1109          Risk Analysis Index Results This Encounter    No data found in the last 10 encounters.       Stop  Bang Score      Flowsheet Row Pre-Admission Testing from 5/2/2025 in [facility] Clinical Support from 4/11/2025 in [facility]   Do you snore loudly? 0 filed at 05/02/2025 1503 0 filed at 04/11/2025 1229   Do you often feel tired or fatigued after your sleep? 0 filed at 05/02/2025 1503 0 filed at 04/11/2025 1229   Has anyone ever observed you stop breathing in your sleep? 1 filed at 05/02/2025 1503 1 filed at 04/11/2025 1229   Do you have or are you being treated for high blood pressure? 1 filed at 05/02/2025 1503 1 filed at 04/11/2025 1229   Recent BMI (Calculated) 45 filed at 05/02/2025 1503 45.4 filed at 04/11/2025 1229   Is BMI greater than 35 kg/m2? 1=Yes filed at 05/02/2025 1503 1=Yes filed at 04/11/2025 1229   Age older than 50 years old? 1=Yes filed at 05/02/2025 1503 1=Yes filed at 04/11/2025 1229   Is your neck circumference greater than 17 inches (Male) or 16 inches (Female)? 1 filed at 05/02/2025 1503 --   Gender - Male 1=Yes filed at 05/02/2025 1503 1=Yes filed at 04/11/2025 1229   STOP-BANG Total Score 6 filed at 05/02/2025 1503 --          Prodigy: High Risk  Total Score: 16              Prodigy Age Score      Prodigy Gender Score          ARISCAT Score for Postoperative Pulmonary Complications      Flowsheet Row Pre-Admission Testing from 5/2/2025 in [facility] Pre-Admission Testing from 12/3/2024 in [facility]   Age Calculated Score 3 filed at 05/02/2025 1532 3 filed at 12/03/2024 1117   Preoperative SpO2 0 filed at 05/02/2025 1532 0 filed at 12/03/2024 1117   Respiratory infection in the last month Either upper or lower (i.e., URI, bronchitis, pneumonia), with fever and antibiotic treatment 0 filed at 05/02/2025 1532 0 filed at 12/03/2024 1117   Preoperative anemia (Hgb less than 10 g/dl) 0 filed at 05/02/2025 1532 0 filed at 12/03/2024 1117   Surgical incision  0 filed at 05/02/2025 1532 0 filed at 12/03/2024 1117   Duration of  surgery  0 filed at 05/02/2025 1532 0 filed at 12/03/2024 1117   Emergency Procedure  0 filed at 05/02/2025 1532 --   ARISCAT Total Score  3 filed at 05/02/2025 1532 --          [place] Perioperative Risk for Myocardial Infarction or Cardiac Arrest (DHRUV)      Flowsheet Row Pre-Admission Testing from 5/2/2025 in [facility] Pre-Admission Testing from 12/3/2024 in [facility]   Calculated Age Score 1.34 filed at 05/02/2025 1532 1.32 filed at 12/03/2024 1117   Functional Status  0 filed at 05/02/2025 1532 0 filed at 12/03/2024 1117   ASA Class  -1.92 filed at 05/02/2025 1532 -1.92 filed at 12/03/2024 1117   Creatinine 0 filed at 05/02/2025 1532 0 filed at 12/03/2024 1117   Type of Procedure  -0.26 filed at 05/02/2025 1532 -0.26 filed at 12/03/2024 1117   DHRUV Total Score  -6.09 filed at 05/02/2025 1532 -6.11 filed at 12/03/2024 1117   DHRUV % 0.23 filed at 05/02/2025 1532 0.22 filed at 12/03/2024 1117          Assessment & Plan:    Neuro:  No diagnosis or significant findings on chart review or clinical presentation and evaluation.     HEENT/Airway:  No significant findings on chart review or clinical presentation and evaluation.   History of Obstructive sleep apnea-Noncompliant with CPAP.  STOP-BANG Score-6 points high risk for FLOYD    Mallampati::  IV    TM distance::  >3 FB    Neck ROM::  Full  Dentures-reports full dentures.   Crowns-denies  Implants-denies    Cardiovascular:  No significant findings on chart review or clinical presentation and evaluation.   History of Paroxysmal atrial fibrillation-Managed with Metoprolol and Eliquis. Had a RFA completed in 9/2024 with no reoccurrence. Last EKG SR. HS RRR.   History of Hypertension-Managed with Metoprolol and Losartan. BP in PAT was 146/89.  History of Hyperlipidemia-Managed with Rosuvastatin  Positive for Edema of lower extremities-Presents with 2+ TIFFANIE bilaterally. Patient states it has been present for at least a year. Will screen  with PROBNP.   METS: 7  RCRI: 0 points, 3.9%  risk for postoperative MACE   DHRUV: 0.23% risk for postoperative MACE  EKG -Completed 3/11/2025  Sinus rhythm with occasional Premature ventricular complexes  Low voltage QRS  Borderline ECG  When compared with ECG of 23-JAN-2025 09:12,  Premature ventricular complexes are now Present  Questionable change in QRS axis  Confirmed by Nickolas Bajwa ([id number]) on 3/13/2025 9:55:03 AM    Pulmonary:  No significant findings on chart review or clinical presentation and evaluation.   History of Chronic obstructive pulmonary disease-No current medication. LSCTAB with a resting SPO2 of 97% RA.   ARISCAT: <26 points, 1.6% risk of in-hospital postoperative pulmonary complication  PRODIGY: High risk for opioid induced respiratory depression  Smoking History-He quit smoking approximately 25 years ago. Previously smoked 1 PPD x 20 years.   Discussed smoking cessation and deep breathing handout given    Renal/Urinary:  No diagnosis or significant findings on chart review or clinical presentation and evaluation, however, the patient is at increased risk of perioperative renal complications secondary to age>/= 56, male sex, HTN, and diabetes. Preventative measures include BP monitoring, medication compliance, and hydration management.   CMP-Pending  Creatinine-  GFR-    Endocrine:  No significant findings on chart review or clinical presentation and evaluation.   History of Type 2 diabetes mellitus-Managed with Metformin.   [id number]    Hematologic/Immunology:  No diagnosis or significant findings on chart review or clinical presentation and evaluation.  The patient is not a Jehovah’s witness and will accept blood and blood products if medically indicated.   History of previous blood transfusions No  CBC-Pending  HGB-Pending  Caprini Score 9, patient at High for postoperative DVT. Pt supplied education/VTE handout  Anticoagulation use: Yes   Eliquis-Pending    Gastrointestinal:   No  diagnosis or significant findings on chart review or clinical presentation and evaluation.   Recreational drug use: none  Alcohol use none    Infectious disease:   No diagnosis or significant findings on chart review or clinical presentation and evaluation.     Musculoskeletal:   No diagnosis on chart review or clinical presentation and evaluation.   Positive for morbid obesity-BMI 45.02  JHFRAT score-8 points. moderate risk for falls    Anesthesia:  ASA 3 - Patient with moderate systemic disease with functional limitations  Anticipated anesthesia-general  History of General anesthesia- yes  Complications- PONV  No family history of anesthesia complications    Labs & Imaging ordered:  CBC, BMP, UC, PT/INR, BNP, HBA1C  Nickel/metal allergy-negative  Shellfish allergy-negative    Overall, patient Moderate Risk for the scheduled Low Risk surgery. Discussed with patient medication instructions, NPO guidelines, and any questions or concerns.     Face to face time spent 45 minutes    Preoperative clearance requested from Cardiology  Reason: Cardiac history, OAC instructions  Risk stratification:  Provider: Dr Zeyad Hernandez  Recommendations:  Surgery: HOLEP       [1]   Past Medical History:  Diagnosis Date    A-fib (Multi)     Atrial flutter (Multi) 09/26/2023    Benign essential hypertension 06/10/2024    BPH (benign prostatic hyperplasia)     Colon polyp     COPD (chronic obstructive pulmonary disease) (Multi)     DM2 (diabetes mellitus, type 2) (Multi)     GERD (gastroesophageal reflux disease) 06/10/2024    Hyperlipidemia 06/10/2024    Hypothyroidism 06/10/2024    Irregular heart beat     Other chest pain 02/16/2021    Chest tightness or pressure    Personal history of other benign neoplasm     History of benign neoplasm of pharynx    Prostate cancer (Multi)     Sleep apnea     does not use device    Suspected sleep apnea 06/14/2024    Urinary tract infection    [2]   Past Surgical History:  Procedure Laterality Date     ABLATION OF DYSRHYTHMIC FOCUS      CARDIAC CATHETERIZATION  9/12/24    CARDIAC ELECTROPHYSIOLOGY PROCEDURE N/A 09/12/2024    Procedure: Ablation A-Fib;  Surgeon: Nickolas Bajwa MD;  Location: [facility] Cardiac Cath Lab;  Service: Electrophysiology;  Laterality: N/A;    COLONOSCOPY      OTHER SURGICAL HISTORY      Excision Of Tracheal Tumor    OTHER SURGICAL HISTORY  08/09/2018    Oral Surgery Tooth Extraction [place] Tooth   [3]   Family History  Problem Relation Name Age of Onset    Heart disease Mother Lupe De La Cruz         valve    Kidney disease Mother Lupe De La Cruz     Diabetes Sister Heide     Endometrial cancer Sister Heide     Diabetes type II Sister Heide     Colon cancer Sister Heide     Diabetes Brother [facility]     Alcohol abuse Brother [facility]     Cancer Brother [facility]    [4]   Allergies  Allergen Reactions    Penicillins Other     Blisters in mouth      Omeprazole Rash     Patient unsure of reaction

## 2025-05-05 DIAGNOSIS — N39.0 URINARY TRACT INFECTION IN MALE: ICD-10-CM

## 2025-05-06 LAB
AMIKACIN ISLT MIC: NORMAL
AMOXICILLIN+CLAV ISLT MIC: NORMAL
AMPICILLIN [SUSCEPTIBILITY] BY MINIMUM INHIBITORY CONCENTRATION (MIC): NORMAL UG/ML
AMPICILLIN+SULBACTAM [SUSCEPTIBILITY] BY MINIMUM INHIBITORY CONCENTRATION (MIC): NORMAL
ANION GAP SERPL CALCULATED.4IONS-SCNC: 11 MMOL/L (CALC) (ref 7–17)
AZTREONAM [SUSCEPTIBILITY] BY MINIMUM INHIBITORY CONCENTRATION (MIC): NORMAL UG/ML
BACTERIA ISLT: NORMAL
BACTERIA UR CULT: ABNORMAL
BUN SERPL-MCNC: 18 MG/DL (ref 7–25)
BUN/CREAT SERPL: ABNORMAL (CALC) (ref 6–22)
CALCIUM SERPL-MCNC: 8.6 MG/DL (ref 8.6–10.3)
CEFAZOLIN [SUSCEPTIBILITY] BY MINIMUM INHIBITORY CONCENTRATION (MIC): NORMAL
CEFEPIME [SUSCEPTIBILITY] BY MINIMUM INHIBITORY CONCENTRATION (MIC): NORMAL
CEFOXITIN ISLT MIC: NORMAL
CEFTAROLINE [SUSCEPTIBILITY] BY MINIMUM INHIBITORY CONCENTRATION (MIC): NORMAL
CEFTAZIDIME [SUSCEPTIBILITY] BY MINIMUM INHIBITORY CONCENTRATION (MIC): NORMAL
CEFTRIAXONE ISLT MIC: NORMAL
CEFUROXIME PARENTERAL [SUSCEPTIBILITY] BY MINIMUM INHIBITORY CONCENTRATION (MIC): NORMAL
CHLORAMPHENICOL [SUSCEPTIBILITY] BY MINIMUM INHIBITORY CONCENTRATION (MIC): NORMAL UG/ML
CHLORIDE SERPL-SCNC: 100 MMOL/L (ref 98–110)
CIPROFLOXACIN ISLT MIC: NORMAL
CLARITHRO ISLT MIC: NORMAL
CLINDAMYCIN [SUSCEPTIBILITY] BY MINIMUM INHIBITORY CONCENTRATION (MIC): NORMAL UG/ML
CO2 SERPL-SCNC: 25 MMOL/L (ref 20–32)
CREAT SERPL-MCNC: 1.16 MG/DL (ref 0.7–1.35)
DAPTOMYCIN [SUSCEPTIBILITY] BY MINIMUM INHIBITORY CONCENTRATION (MIC): NORMAL
DORIPENEM [SUSCEPTIBILITY] BY MINIMUM INHIBITORY CONCENTRATION (MIC): NORMAL
EGFRCR SERPLBLD CKD-EPI 2021: 69 ML/MIN/1.73M2
ERTAPENEM [SUSCEPTIBILITY] BY MINIMUM INHIBITORY CONCENTRATION (MIC): NORMAL
ERYTHROCYTE [DISTWIDTH] IN BLOOD BY AUTOMATED COUNT: 13 % (ref 11–15)
ERYTHROMYCIN [SUSCEPTIBILITY] BY MINIMUM INHIBITORY CONCENTRATION (MIC): NORMAL
GENTAMICIN ISLT MIC: NORMAL UG/ML
GENTAMICIN.HIGH POTENCY [SUSCEPTIBILITY] BY MINIMUM INHIBITORY CONCENTRATION (MIC): NORMAL
GLUCOSE SERPL-MCNC: 286 MG/DL (ref 65–99)
HCT VFR BLD AUTO: 41.2 % (ref 38.5–50)
HGB BLD-MCNC: 13.2 G/DL (ref 13.2–17.1)
IMIPENEM ISLT MIC: NORMAL
INR PPP: 1
LEVOFLOXACIN ISLT MIC: NORMAL
LINEZOLID ISLT MIC: NORMAL
MCH RBC QN AUTO: 29 PG (ref 27–33)
MCHC RBC AUTO-ENTMCNC: 32 G/DL (ref 32–36)
MCV RBC AUTO: 90.5 FL (ref 80–100)
MEROPENEM [SUSCEPTIBILITY] BY MINIMUM INHIBITORY CONCENTRATION (MIC): NORMAL
MOXIFLOXACIN ISLT MIC: NORMAL
NITROFURANTOIN [SUSCEPTIBILITY] BY MINIMUM INHIBITORY CONCENTRATION (MIC): NORMAL
OXACILLIN [SUSCEPTIBILITY] BY MINIMUM INHIBITORY CONCENTRATION (MIC): NORMAL
PENICILLIN ISLT MIC: NORMAL UG/ML
PIPERACILLIN+TAZOBACTAM [SUSCEPTIBILITY] BY MINIMUM INHIBITORY CONCENTRATION (MIC): NORMAL
PLATELET # BLD AUTO: 257 THOUSAND/UL (ref 140–400)
PMV BLD REES-ECKER: 9.8 FL (ref 7.5–12.5)
POTASSIUM SERPL-SCNC: 5.1 MMOL/L (ref 3.5–5.3)
PROTHROMBIN TIME: 10.4 SEC (ref 9–11.5)
RBC # BLD AUTO: 4.55 MILLION/UL (ref 4.2–5.8)
SERVICE CMNT-IMP: NORMAL
SODIUM SERPL-SCNC: 136 MMOL/L (ref 135–146)
SPECIMEN SOURCE: NORMAL
TETRACYCLINE [SUSCEPTIBILITY] BY MINIMUM INHIBITORY CONCENTRATION (MIC): NORMAL
TICARCILLIN+CLAVULANATE [SUSCEPTIBILITY] BY MINIMUM INHIBITORY CONCENTRATION (MIC): NORMAL
TIGECYCLINE ISLT MIC: NORMAL
TMP SMX ISLT MIC: NORMAL
TOBRAMYCIN ISLT MIC: NORMAL
VANCOMYCIN [SUSCEPTIBILITY] BY MINIMUM INHIBITORY CONCENTRATION (MIC): NORMAL
WBC # BLD AUTO: 9.1 THOUSAND/UL (ref 3.8–10.8)

## 2025-05-06 RX ORDER — SULFAMETHOXAZOLE AND TRIMETHOPRIM 800; 160 MG/1; MG/1
1 TABLET ORAL 2 TIMES DAILY
Qty: 14 TABLET | Refills: 0 | Status: SHIPPED | OUTPATIENT
Start: 2025-05-06 | End: 2025-05-13

## 2025-05-12 ENCOUNTER — ANESTHESIA EVENT (OUTPATIENT)
Dept: OPERATING ROOM | Facility: HOSPITAL | Age: 65
End: 2025-05-12
Payer: COMMERCIAL

## 2025-05-12 ENCOUNTER — HOSPITAL ENCOUNTER (OUTPATIENT)
Facility: HOSPITAL | Age: 65
Setting detail: OUTPATIENT SURGERY
Discharge: HOME | End: 2025-05-12
Attending: UROLOGY | Admitting: UROLOGY
Payer: COMMERCIAL

## 2025-05-12 ENCOUNTER — ANESTHESIA (OUTPATIENT)
Dept: OPERATING ROOM | Facility: HOSPITAL | Age: 65
End: 2025-05-12
Payer: COMMERCIAL

## 2025-05-12 VITALS
DIASTOLIC BLOOD PRESSURE: 82 MMHG | BODY MASS INDEX: 39.17 KG/M2 | HEIGHT: 75 IN | RESPIRATION RATE: 16 BRPM | TEMPERATURE: 97.5 F | OXYGEN SATURATION: 97 % | SYSTOLIC BLOOD PRESSURE: 142 MMHG | HEART RATE: 60 BPM | WEIGHT: 315 LBS

## 2025-05-12 DIAGNOSIS — N40.1 BPH ASSOCIATED WITH NOCTURIA: Primary | ICD-10-CM

## 2025-05-12 DIAGNOSIS — R35.1 BPH ASSOCIATED WITH NOCTURIA: Primary | ICD-10-CM

## 2025-05-12 LAB
ANION GAP SERPL CALCULATED.4IONS-SCNC: 11 MMOL/L (CALC) (ref 7–17)
BACTERIA ISLT: NORMAL
BACTERIA UR CULT: ABNORMAL
BUN SERPL-MCNC: 18 MG/DL (ref 7–25)
BUN/CREAT SERPL: ABNORMAL (CALC) (ref 6–22)
CALCIUM SERPL-MCNC: 8.6 MG/DL (ref 8.6–10.3)
CEFTRIAXONE ISLT MIC: NORMAL
CHLORIDE SERPL-SCNC: 100 MMOL/L (ref 98–110)
CO2 SERPL-SCNC: 25 MMOL/L (ref 20–32)
CREAT SERPL-MCNC: 1.16 MG/DL (ref 0.7–1.35)
EGFRCR SERPLBLD CKD-EPI 2021: 69 ML/MIN/1.73M2
ERYTHROCYTE [DISTWIDTH] IN BLOOD BY AUTOMATED COUNT: 13 % (ref 11–15)
GLUCOSE BLD MANUAL STRIP-MCNC: 155 MG/DL (ref 74–99)
GLUCOSE SERPL-MCNC: 286 MG/DL (ref 65–99)
HCT VFR BLD AUTO: 41.2 % (ref 38.5–50)
HGB BLD-MCNC: 13.2 G/DL (ref 13.2–17.1)
INR PPP: 1
LEVOFLOXACIN ISLT MIC: NORMAL
LINEZOLID ISLT MIC: NORMAL
MCH RBC QN AUTO: 29 PG (ref 27–33)
MCHC RBC AUTO-ENTMCNC: 32 G/DL (ref 32–36)
MCV RBC AUTO: 90.5 FL (ref 80–100)
MEROPENEM [SUSCEPTIBILITY] BY MINIMUM INHIBITORY CONCENTRATION (MIC): NORMAL
PENICILLIN ISLT MIC: NORMAL UG/ML
PLATELET # BLD AUTO: 257 THOUSAND/UL (ref 140–400)
PMV BLD REES-ECKER: 9.8 FL (ref 7.5–12.5)
POTASSIUM SERPL-SCNC: 5.1 MMOL/L (ref 3.5–5.3)
PROTHROMBIN TIME: 10.4 SEC (ref 9–11.5)
RBC # BLD AUTO: 4.55 MILLION/UL (ref 4.2–5.8)
SERVICE CMNT-IMP: NORMAL
SODIUM SERPL-SCNC: 136 MMOL/L (ref 135–146)
SPECIMEN SOURCE: NORMAL
TETRACYCLINE [SUSCEPTIBILITY] BY MINIMUM INHIBITORY CONCENTRATION (MIC): NORMAL
VANCOMYCIN [SUSCEPTIBILITY] BY MINIMUM INHIBITORY CONCENTRATION (MIC): NORMAL
WBC # BLD AUTO: 9.1 THOUSAND/UL (ref 3.8–10.8)

## 2025-05-12 PROCEDURE — 3700000002 HC GENERAL ANESTHESIA TIME - EACH INCREMENTAL 1 MINUTE: Performed by: UROLOGY

## 2025-05-12 PROCEDURE — C1889 IMPLANT/INSERT DEVICE, NOC: HCPCS | Performed by: UROLOGY

## 2025-05-12 PROCEDURE — 3600000004 HC OR TIME - INITIAL BASE CHARGE - PROCEDURE LEVEL FOUR: Performed by: UROLOGY

## 2025-05-12 PROCEDURE — C1758 CATHETER, URETERAL: HCPCS | Performed by: UROLOGY

## 2025-05-12 PROCEDURE — 82947 ASSAY GLUCOSE BLOOD QUANT: CPT

## 2025-05-12 PROCEDURE — 7100000001 HC RECOVERY ROOM TIME - INITIAL BASE CHARGE: Performed by: UROLOGY

## 2025-05-12 PROCEDURE — C1782 MORCELLATOR: HCPCS | Performed by: UROLOGY

## 2025-05-12 PROCEDURE — 88307 TISSUE EXAM BY PATHOLOGIST: CPT | Performed by: PATHOLOGY

## 2025-05-12 PROCEDURE — 7100000009 HC PHASE TWO TIME - INITIAL BASE CHARGE: Performed by: UROLOGY

## 2025-05-12 PROCEDURE — 7100000010 HC PHASE TWO TIME - EACH INCREMENTAL 1 MINUTE: Performed by: UROLOGY

## 2025-05-12 PROCEDURE — 3600000009 HC OR TIME - EACH INCREMENTAL 1 MINUTE - PROCEDURE LEVEL FOUR: Performed by: UROLOGY

## 2025-05-12 PROCEDURE — 2720000007 HC OR 272 NO HCPCS: Performed by: UROLOGY

## 2025-05-12 PROCEDURE — 2500000004 HC RX 250 GENERAL PHARMACY W/ HCPCS (ALT 636 FOR OP/ED): Mod: JZ | Performed by: NURSE ANESTHETIST, CERTIFIED REGISTERED

## 2025-05-12 PROCEDURE — 2500000005 HC RX 250 GENERAL PHARMACY W/O HCPCS: Performed by: UROLOGY

## 2025-05-12 PROCEDURE — 52649 PROSTATE LASER ENUCLEATION: CPT | Performed by: UROLOGY

## 2025-05-12 PROCEDURE — 7100000002 HC RECOVERY ROOM TIME - EACH INCREMENTAL 1 MINUTE: Performed by: UROLOGY

## 2025-05-12 PROCEDURE — 2500000004 HC RX 250 GENERAL PHARMACY W/ HCPCS (ALT 636 FOR OP/ED): Mod: JZ | Performed by: UROLOGY

## 2025-05-12 PROCEDURE — 88307 TISSUE EXAM BY PATHOLOGIST: CPT | Mod: TC,SUR,BEALAB | Performed by: UROLOGY

## 2025-05-12 PROCEDURE — A52649 PR LASER ENUCLEATION PROSTATE W MORCELLATION: Performed by: ANESTHESIOLOGY

## 2025-05-12 PROCEDURE — 2500000005 HC RX 250 GENERAL PHARMACY W/O HCPCS: Performed by: NURSE ANESTHETIST, CERTIFIED REGISTERED

## 2025-05-12 PROCEDURE — A52649 PR LASER ENUCLEATION PROSTATE W MORCELLATION: Performed by: NURSE ANESTHETIST, CERTIFIED REGISTERED

## 2025-05-12 PROCEDURE — 3700000001 HC GENERAL ANESTHESIA TIME - INITIAL BASE CHARGE: Performed by: UROLOGY

## 2025-05-12 RX ORDER — PHENAZOPYRIDINE HYDROCHLORIDE 200 MG/1
200 TABLET, FILM COATED ORAL 3 TIMES DAILY PRN
Qty: 30 TABLET | Refills: 0 | Status: SHIPPED | OUTPATIENT
Start: 2025-05-12

## 2025-05-12 RX ORDER — LIDOCAINE HYDROCHLORIDE 10 MG/ML
0.1 INJECTION, SOLUTION EPIDURAL; INFILTRATION; INTRACAUDAL; PERINEURAL ONCE
Status: DISCONTINUED | OUTPATIENT
Start: 2025-05-12 | End: 2025-05-12 | Stop reason: HOSPADM

## 2025-05-12 RX ORDER — ONDANSETRON HYDROCHLORIDE 2 MG/ML
INJECTION, SOLUTION INTRAVENOUS AS NEEDED
Status: DISCONTINUED | OUTPATIENT
Start: 2025-05-12 | End: 2025-05-12

## 2025-05-12 RX ORDER — SULFAMETHOXAZOLE AND TRIMETHOPRIM 800; 160 MG/1; MG/1
1 TABLET ORAL 2 TIMES DAILY
Qty: 14 TABLET | Refills: 0 | Status: SHIPPED | OUTPATIENT
Start: 2025-05-12 | End: 2025-05-13 | Stop reason: ALTCHOICE

## 2025-05-12 RX ORDER — LIDOCAINE HYDROCHLORIDE 10 MG/ML
INJECTION, SOLUTION EPIDURAL; INFILTRATION; INTRACAUDAL; PERINEURAL AS NEEDED
Status: DISCONTINUED | OUTPATIENT
Start: 2025-05-12 | End: 2025-05-12

## 2025-05-12 RX ORDER — PHENYLEPHRINE HCL IN 0.9% NACL 1 MG/10 ML
SYRINGE (ML) INTRAVENOUS AS NEEDED
Status: DISCONTINUED | OUTPATIENT
Start: 2025-05-12 | End: 2025-05-12

## 2025-05-12 RX ORDER — ONDANSETRON HYDROCHLORIDE 2 MG/ML
4 INJECTION, SOLUTION INTRAVENOUS ONCE AS NEEDED
Status: DISCONTINUED | OUTPATIENT
Start: 2025-05-12 | End: 2025-05-12 | Stop reason: HOSPADM

## 2025-05-12 RX ORDER — ROCURONIUM BROMIDE 10 MG/ML
INJECTION, SOLUTION INTRAVENOUS AS NEEDED
Status: DISCONTINUED | OUTPATIENT
Start: 2025-05-12 | End: 2025-05-12

## 2025-05-12 RX ORDER — FENTANYL CITRATE 50 UG/ML
50 INJECTION, SOLUTION INTRAMUSCULAR; INTRAVENOUS EVERY 5 MIN PRN
Status: DISCONTINUED | OUTPATIENT
Start: 2025-05-12 | End: 2025-05-12 | Stop reason: HOSPADM

## 2025-05-12 RX ORDER — OXYCODONE HYDROCHLORIDE 5 MG/1
5 TABLET ORAL EVERY 4 HOURS PRN
Status: DISCONTINUED | OUTPATIENT
Start: 2025-05-12 | End: 2025-05-12 | Stop reason: HOSPADM

## 2025-05-12 RX ORDER — MEPERIDINE HYDROCHLORIDE 25 MG/ML
12.5 INJECTION INTRAMUSCULAR; INTRAVENOUS; SUBCUTANEOUS EVERY 10 MIN PRN
Status: DISCONTINUED | OUTPATIENT
Start: 2025-05-12 | End: 2025-05-12 | Stop reason: HOSPADM

## 2025-05-12 RX ORDER — PROPOFOL 10 MG/ML
INJECTION, EMULSION INTRAVENOUS AS NEEDED
Status: DISCONTINUED | OUTPATIENT
Start: 2025-05-12 | End: 2025-05-12

## 2025-05-12 RX ORDER — MIDAZOLAM HYDROCHLORIDE 1 MG/ML
INJECTION, SOLUTION INTRAMUSCULAR; INTRAVENOUS AS NEEDED
Status: DISCONTINUED | OUTPATIENT
Start: 2025-05-12 | End: 2025-05-12

## 2025-05-12 RX ORDER — TRANEXAMIC ACID 100 MG/ML
INJECTION, SOLUTION INTRAVENOUS AS NEEDED
Status: DISCONTINUED | OUTPATIENT
Start: 2025-05-12 | End: 2025-05-12

## 2025-05-12 RX ORDER — LABETALOL HYDROCHLORIDE 5 MG/ML
5 INJECTION, SOLUTION INTRAVENOUS ONCE AS NEEDED
Status: DISCONTINUED | OUTPATIENT
Start: 2025-05-12 | End: 2025-05-12 | Stop reason: HOSPADM

## 2025-05-12 RX ORDER — FENTANYL CITRATE 50 UG/ML
25 INJECTION, SOLUTION INTRAMUSCULAR; INTRAVENOUS EVERY 5 MIN PRN
Status: DISCONTINUED | OUTPATIENT
Start: 2025-05-12 | End: 2025-05-12 | Stop reason: HOSPADM

## 2025-05-12 RX ORDER — CIPROFLOXACIN 2 MG/ML
400 INJECTION, SOLUTION INTRAVENOUS ONCE
Status: COMPLETED | OUTPATIENT
Start: 2025-05-12 | End: 2025-05-12

## 2025-05-12 RX ORDER — SODIUM CHLORIDE, SODIUM LACTATE, POTASSIUM CHLORIDE, CALCIUM CHLORIDE 600; 310; 30; 20 MG/100ML; MG/100ML; MG/100ML; MG/100ML
20 INJECTION, SOLUTION INTRAVENOUS CONTINUOUS
Status: DISCONTINUED | OUTPATIENT
Start: 2025-05-12 | End: 2025-05-12 | Stop reason: HOSPADM

## 2025-05-12 RX ORDER — FENTANYL CITRATE 50 UG/ML
INJECTION, SOLUTION INTRAMUSCULAR; INTRAVENOUS AS NEEDED
Status: DISCONTINUED | OUTPATIENT
Start: 2025-05-12 | End: 2025-05-12

## 2025-05-12 RX ORDER — LIDOCAINE HYDROCHLORIDE 20 MG/ML
JELLY TOPICAL AS NEEDED
Status: DISCONTINUED | OUTPATIENT
Start: 2025-05-12 | End: 2025-05-12 | Stop reason: HOSPADM

## 2025-05-12 RX ADMIN — TRANEXAMIC ACID 1000 MG: 100 INJECTION, SOLUTION INTRAVENOUS at 09:09

## 2025-05-12 RX ADMIN — Medication 100 MCG: at 09:43

## 2025-05-12 RX ADMIN — CIPROFLOXACIN 400 MG: 2 INJECTION, SOLUTION INTRAVENOUS at 08:55

## 2025-05-12 RX ADMIN — SODIUM CHLORIDE, POTASSIUM CHLORIDE, SODIUM LACTATE AND CALCIUM CHLORIDE: 600; 310; 30; 20 INJECTION, SOLUTION INTRAVENOUS at 08:55

## 2025-05-12 RX ADMIN — FENTANYL CITRATE 100 MCG: 50 INJECTION, SOLUTION INTRAMUSCULAR; INTRAVENOUS at 08:59

## 2025-05-12 RX ADMIN — DEXAMETHASONE SODIUM PHOSPHATE 8 MG: 4 INJECTION, SOLUTION INTRAMUSCULAR; INTRAVENOUS at 09:12

## 2025-05-12 RX ADMIN — PROPOFOL 200 MG: 10 INJECTION, EMULSION INTRAVENOUS at 08:59

## 2025-05-12 RX ADMIN — MIDAZOLAM 2 MG: 1 INJECTION INTRAMUSCULAR; INTRAVENOUS at 08:55

## 2025-05-12 RX ADMIN — ROCURONIUM BROMIDE 100 MG: 10 INJECTION INTRAVENOUS at 08:59

## 2025-05-12 RX ADMIN — ONDANSETRON 4 MG: 2 INJECTION, SOLUTION INTRAMUSCULAR; INTRAVENOUS at 09:12

## 2025-05-12 RX ADMIN — LIDOCAINE HYDROCHLORIDE 5 ML: 10 INJECTION, SOLUTION EPIDURAL; INFILTRATION; INTRACAUDAL; PERINEURAL at 08:59

## 2025-05-12 RX ADMIN — SUGAMMADEX 200 MG: 100 INJECTION, SOLUTION INTRAVENOUS at 10:04

## 2025-05-12 ASSESSMENT — PAIN - FUNCTIONAL ASSESSMENT
PAIN_FUNCTIONAL_ASSESSMENT: 0-10

## 2025-05-12 ASSESSMENT — PAIN SCALES - GENERAL
PAINLEVEL_OUTOF10: 0 - NO PAIN
PAINLEVEL_OUTOF10: 1
PAINLEVEL_OUTOF10: 1
PAINLEVEL_OUTOF10: 0 - NO PAIN
PAINLEVEL_OUTOF10: 0 - NO PAIN

## 2025-05-12 ASSESSMENT — PAIN DESCRIPTION - DESCRIPTORS
DESCRIPTORS: BURNING;ACHING
DESCRIPTORS: BURNING;ACHING

## 2025-05-12 NOTE — ANESTHESIA PROCEDURE NOTES
Airway  Date/Time: 5/12/2025 9:02 AM  Reason: elective    Airway not difficult    Staffing  Performed: CRNA   Authorized by: Shailesh Monroy MD    Performed by: MELBA Hatch-CRNA  Patient location during procedure: OR    Patient Condition  Indications for airway management: anesthesia  Patient position: sniffing  Sedation level: deep     Final Airway Details   Preoxygenated: yes  Final airway type: endotracheal airway  Successful airway: ETT  Cuffed: yes   Successful intubation technique: direct laryngoscopy  Adjuncts used in placement: intubating stylet  Endotracheal tube insertion site: oral  Blade: Renu  Blade size: #4  ETT size (mm): 8.0  Cormack-Lehane Classification: grade I - full view of glottis  Placement verified by: chest auscultation and capnometry   Cuff volume (mL): 7  Measured from: lips  ETT to lips (cm): 23  Number of attempts at approach: 1

## 2025-05-12 NOTE — OP NOTE
HoLEP ( Morcellator, HoLEP set . P120 , Holmium ) Operative Note     Date: 2025  OR Location: [facility]    Name: Eligio Hogue YOB: 1960, Age: 65 y.o., MRN: [id number], Sex: male    Diagnosis  Pre-op Diagnosis      * BPH associated with nocturia [N40.1, R35.1] Post-op Diagnosis     * BPH associated with nocturia [N40.1, R35.1]     Procedures  HoLEP ( Morcellator, HoLEP set . P120 , Holmium )  65731 - MS LASER ENUCLEATION PROSTATE W/MORCELLATION      Surgeons      * Beth Hendricks - Primary    Resident/Fellow/Other Assistant:  Surgeons and Role:  * No surgeons found with a matching role *    Staff:   Nolanulator: Tatiana Hawkins Person: Sebastian    Anesthesia Staff: Anesthesiologist: Shailesh Monroy MD  CRNA: MELBA Hatch-CRNA    Procedure Summary  Anesthesia: General  ASA: III  Estimated Blood Loss: 5mL  Intra-op Medications:   Administrations occurring from 0850 to 1045 on 25:   Medication Name Total Dose   lidocaine 2 % mucosal jelly (Uro-Jet) 1 Application   dexAMETHasone (Decadron) 4 mg/mL IV Syringe 2 mL 8 mg   fentaNYL (Sublimaze) injection 50 mcg/mL 100 mcg   lactated Ringer's infusion Cannot be calculated   lidocaine PF (Xylocaine-MPF) local injection 1 % 5 mL   midazolam (Versed) injection 1 mg/mL 2 mg   ondansetron (Zofran) 2 mg/mL injection 4 mg   phenylephrine 100 mcg/mL syringe 10 mL (prefilled) 100 mcg   propofol (Diprivan) injection 10 mg/mL 200 mg   rocuronium (ZeMuron) 50 mg/5 mL injection 100 mg   sugammadex (Bridion) 200 mg/2 mL injection 200 mg   tranexamic acid (Cyklokapron) injection 1,000 mg   ciprofloxacin (Cipro) 400 mg in dextrose 5%  mL 400 mg              Anesthesia Record               Intraprocedure I/O Totals          Intake    Tranexamic Acid 0.00 mL    The total shown is the total volume documented since Anesthesia Start was filed.    ciprofloxacin (Cipro) 400 mg in dextrose 5%  mL 200.00 mL    Total Intake 200 mL          Specimen:   ID Type Source  Tests Collected by Time   1 : PROSTATE CHIPS Tissue PROSTATE HOLEP SURGICAL PATHOLOGY EXAM Beth Hendricks MD 5/12/2025 0912                 Drains and/or Catheters:   Urethral Catheter Coude;Latex 22 Fr. (Active)       Tourniquet Times:         Implants:     Findings: BPH    Indications: Eligio Hogue is an 65 y.o. male who is having surgery for BPH associated with nocturia [N40.1, R35.1].     The patient was seen in the preoperative area. The risks, benefits, complications, treatment options, non-operative alternatives, expected recovery and outcomes were discussed with the patient. The possibilities of reaction to medication, pulmonary aspiration, injury to surrounding structures, bleeding, recurrent infection, the need for additional procedures, failure to diagnose a condition, and creating a complication requiring transfusion or operation were discussed with the patient. The patient concurred with the proposed plan, giving informed consent.  The site of surgery was properly noted/marked if necessary per policy. The patient has been actively warmed in preoperative area. Preoperative antibiotics have been ordered and given within 1 hours of incision. Venous thrombosis prophylaxis have been ordered including bilateral sequential compression devices    Procedure Details: Preoperative diagnosis: BPH with LUTs    Postoperative diagnosis: same    Procedure: Holmium laser enucleation of prostate and tissue morcellation    Anesthesia: general    IVF: see anesthesia report    EBL: 5 ml    Complications: none    Catheters: 22 Fr 3-way Vasquez catheter    Specimen: prostate chips    Disposition: PACU in stable condition       Enucleation time: 17  Total laser time: 31  Total energy used: 129,970       Patient is a 65 year-old male with significant obstructive and irritative voiding symptoms not responsive to maximal medical therapy.  Ambulatory evaluation demonstrated him to be a good candidate for HoLEP procedure.  Risks and  alternatives were discussed in great detail, he singed the informed consent and agreed to proceed    50 ml of lubricant were injected to the urethra.  Urethral meatus was dilated with repeat sounds to 30 Fr caliber    A 26 Thai Hernandez resectoscope was inserted.  The anterior urethra was normal, there was good coaptation of the membranous urethra, there was a large obstructing prostate.  The bladder was normal without stones or lesions.  Both ureteral orifices were identified.    We started the enucleation using a 550 µm holmium laser fiber.    A circumferential incision was made in the urethra proximal to the sphincter.  It was deepened anteriorly and laterally.  We then entered the space between the capsule and the adenoma bluntly lateral to the verumontanum.  This was done bilaterally.  The posterior plane was developed bilaterally and connected by cutting the fibers proximal to the verumontanum.  The resection was carried as far proximally as possible.    We then returned to the initial incision in the urethra and detached the lateral attachments between the sphincter and the adenoma.  We were able to identify the lateral plane and developed it as proximally as possible by connecting it to the posterior plane.    We then turned to free the anterior portion of the sphincter.  The attachments between the sphincter and the anterior adenoma were taken down with the laser fiber until we met the anterior plan.  We then connected all planes circumferentially.  We continued the enucleation circumferentially until we reached the bladder neck.  The bladder neck was entered anteriorly and good hemostasis was obtained.  Then the bladder neck incision was developed circumferentially around the adenoma.  Adenoma was then rolled into the bladder and residual posterior attachments were removed.    Hemostasis was performed.The laser bridge was removed and the nephroscope loaded with the Piranha morcellator was inserted.  2  irrigations were connected to distended bladder.  Tissue was completely morcellated.    The laser bridge was inserted again and meticulous hemostasis was performed.  I verified that there was no residual tissue in the bladder, and that ureteral orifices were free.    The laser apparatus was removed and a 22 Azeri three-way catheter was inserted and connected to irrigation.    Patient was extubated and moved to the postoperative area in stable condition.         Disposition: patient will have a trial of void on postoperative day one    Evidence of Infection: No   Complications:  None; patient tolerated the procedure well.    Disposition: PACU - hemodynamically stable.  Condition: stable                 Additional Details:     Attending Attestation: I was present and scrubbed for the entire procedure.    Beth Hendricks  Phone Number: [phone]

## 2025-05-12 NOTE — PERIOPERATIVE NURSING NOTE
Pt doing well in recovery. Pt tolerates po well. Abd remains soft, rounded. No c/o pain. Pt with no c/o ponv. Pt ready for phase 2.

## 2025-05-12 NOTE — ANESTHESIA POSTPROCEDURE EVALUATION
Patient: Eligio Hogue    Procedure Summary       Date: 05/12/25 Room / Location: [facility] 02 / [facility]    Anesthesia Start: 0855 Anesthesia Stop: 1017    Procedure: HoLEP ( Morcellator, HoLEP set . P120 , Holmium ) Diagnosis:       BPH associated with nocturia      (BPH associated with nocturia [N40.1, R35.1])    Surgeons: Beth Hendricks MD Responsible Provider: Shailesh Monroy MD    Anesthesia Type: general ASA Status: 3            Anesthesia Type: general    Vitals Value Taken Time   /83 05/12/25 10:28   Temp 36.3 °C (97.3 °F) 05/12/25 10:13   Pulse 62 05/12/25 10:28   Resp 14 05/12/25 10:28   SpO2 97 % 05/12/25 10:28       Anesthesia Post Evaluation    Patient location during evaluation: PACU  Patient participation: complete - patient participated  Level of consciousness: awake  Pain management: adequate  Airway patency: patent  Cardiovascular status: acceptable  Respiratory status: acceptable  Hydration status: acceptable  Postoperative Nausea and Vomiting: none        There were no known notable events for this encounter.

## 2025-05-12 NOTE — DISCHARGE INSTRUCTIONS
Diet  You can eat whatever you like after your surgery. Sometimes the anesthesia can cause nausea, so it may be a good idea to stay away from heavy foods right after you get home from the procedure.    Hernandez catheter  You will have a tube in the bladder called a hernandez catheter. This drains urine from the bladder and exits the penis. Be sure the catheter is well secured to the leg at all times. This catheter typically stays in from one day to a week (7 days) depending on your circumstances. There should never be any tension or tugging on the catheter. Take care not to pull on the catheter when rolling in bed or changing position. The nurses will show you how to attach the hernandez catheter to a leg bag during the day and a big bag at night.    The hernandez catheter has a balloon on the end of it to keep it in place in the bladder. This may give you the feeling you need to urinate. Be assured the catheter is draining and the sensation is from the hernandez catheter balloon. You may also notice urine or blood-tinged urine leaking around the catheter out the tip of the penis. Typically, this due to a bladder spasm and is not a cause for alarm. If the catheter stops draining, get up and walk around. If it is still not draining, call the office or come to the emergency room as it may be clogged and need to be irrigated. Once the hernandez catheter is removed, it is normal to have burning and stinging with urination for a few weeks after surgery. It is also common to have more frequent urination and a greater sense of the urge to urinate. There may not be much warning from the time you feel the urge to urinate to the time when the bladder is ready to empty.    Activity  It is very important to walk after your procedure. Walking prevents blood clots in the legs or lungs. You may go up and down stairs. Avoid any strenuous activity or lifting more than ten pounds for 3 to 4 weeks. This includes any heavy lifting, running, riding a bicycle  or golf. This also includes activities such as raking leaves, mowing the lawn, shoveling snow or other strenuous chores. If you see blood in the urine, increase the amount of water you are drinking and avoid strenuous activity or heavy lifting until the blood clears.    Medications  Take the medications prescribed at the time of your discharge from the hospital. If you are taking any medications on a regular basis prior to your admission to the hospital, you should continue to take those as well. For any aches, pains or headaches, you may use Tylenol or Extra-Strength Tylenol. Sometimes, you will be given a prescription for other pain killers. Do not use any aspirin or aspirin-like compounds or ibuprofen products (NSAIDs) (eg. Advil, Nuprin, Motrin, Bufferin, etc.) for four weeks after surgery. If you start aspirin or aspirin like compounds and you notice blood in your urine, please stop taking it and increase your water intake. Pyridium will be called in that will help with burning.  It will color your urine orange.  Antibiotic will be also prescribed for 1 week.    Avoid constipation  Anesthesia, surgery and narcotic pain medication all increase your risk for constipation. Do not strain to move the bowels as this can impair the healing process and start bleeding. Take plenty of fiber, water and over the counter stool softener to avoid constipation. Stool softener can be taken by mouth twice a day to avoid constipation. A stool softener or laxative is available at any drug store without a prescription (senna or Senokot or SennaGen, Dulcolax or bisacodyl, Miralax, Metamucil, Milk of Magnesia or magnesium hydroxide). Decrease or hold the stool softener for diarrhea or loose stools.    Follow up plan  If you develop a fever greater than 101 degrees Fahrenheit, the catheter stops draining or you are unable to urinate, call the office or come to the emergency room.  Your catheter removal has been scheduled  Please call  [phone] and follow prompts for Dr. Hendricks's  if you are not sure of your appointment time or location

## 2025-05-12 NOTE — ANESTHESIA PREPROCEDURE EVALUATION
Patient: Eligio Hogue    Procedure Information       Date/Time: 05/12/25 0850    Procedure: HoLEP ( Morcellator, HoLEP set . P120 , Holmium )    Location: [facility] / [facility]    Surgeons: Beth Hendricks MD            Relevant Problems   Cardiac   (+) Atrial flutter (Multi)   (+) Benign essential hypertension   (+) Hyperlipidemia   (+) Paroxysmal atrial fibrillation (Multi)      Pulmonary   (+) FLOYD (obstructive sleep apnea)      GI   (+) GERD (gastroesophageal reflux disease)      /Renal   (+) Malignant neoplasm of prostate (Multi)      Endocrine   (+) Hypothyroidism   (+) Type 2 diabetes mellitus      Musculoskeletal   (+) Primary osteoarthritis of both feet     Echo 12/2024:  CONCLUSIONS:   1. Poorly visualized anatomical structures due to suboptimal image quality.   2. Left ventricular ejection fraction is normal, by visual estimate at 60%.   3. Mildly enlarged right ventricle.   4. There is low normal right ventricular systolic function.   5. There is moderate dilatation of the aortic root.   6. The inferior vena cava appears mildly dilated, with IVC inspiratory collapse greater than 50%.    Clinical information reviewed:                   NPO Detail:  No data recorded     Physical Exam    Airway  Mallampati: III  TM distance: >3 FB  Neck ROM: full     Cardiovascular    Dental    Pulmonary    Abdominal            Anesthesia Plan    History of general anesthesia?: yes  History of complications of general anesthesia?: no    ASA 3     general     intravenous induction   Anesthetic plan and risks discussed with patient.    Plan discussed with CRNA.

## 2025-05-12 NOTE — PROGRESS NOTES
[facility]  Outpatient Clinic Note    Subjective   Eligio Hogue is a 65 y.o. male    History of Present Illness   Patient presenting to clinic today for TOV s/p HoLEP with Dr. Hendricks 5/12/2025  History of BPH with LUTS. He has been doing well since surgery. Urine has become clear,  without evidence of clots. He denies any fevers or chills.    Past Medical History and Surgical History   Medical History[1]  Surgical History[2]    Medications  Medications Ordered Prior to Encounter[3]    Objective   Physicial Exam  General: Well developed, well nourished, alert and cooperative, appears in no acute distress  Eyes: Non-injected conjunctiva, sclera clear, no proptosis  Cardiac: Extremities are warm and well perfused. No edema, cyanosis or pallor.   Lungs: Breathing is easy, non-labored. Speaking in clear and complete sentences. Normal diaphragmatic movement.  MSK: Ambulatory with steady gait, unassisted  Neuro: alert and oriented to person, place and time  Psych: Demonstrates good judgement and reason, without hallucinations, abnormal affect or abnormal behaviors.  Skin: no obvious lesions, no rashes.    Admission on 05/12/2025   Component Date Value Ref Range Status    POCT Glucose 05/12/2025 155 (H)  74 - 99 mg/dL Final   Pre-Admission Testing on 05/02/2025   Component Date Value Ref Range Status    Hemoglobin A1C 05/02/2025 7.6 (H)  See comment % Final    Estimated Average Glucose 05/02/2025 171  Not Established mg/dL Final    BNP 05/02/2025 34  0 - 99 pg/mL Final   Orders Only on 04/28/2025   Component Date Value Ref Range Status    GLUCOSE 04/28/2025 286 (H)  65 - 99 mg/dL Final    Comment:               Fasting reference interval     For someone without known diabetes, a glucose  value >125 mg/dL indicates that they may have  diabetes and this should be confirmed with a  follow-up test.         UREA NITROGEN (BUN) 04/28/2025 18  7 - 25 mg/dL Final    CREATININE 04/28/2025 1.16  0.70 - 1.35 mg/dL Final     EGFR 04/28/2025 69  > OR = 60 mL/min/1.73m2 Final    BUN/CREATININE RATIO 04/28/2025 SEE NOTE:  6 - 22 (calc) Final    Comment:    Not Reported: BUN and Creatinine are within     reference range.            SODIUM 04/28/2025 136  135 - 146 mmol/L Final    POTASSIUM 04/28/2025 5.1  3.5 - 5.3 mmol/L Final    CHLORIDE 04/28/2025 100  98 - 110 mmol/L Final    CARBON DIOXIDE 04/28/2025 25  20 - 32 mmol/L Final    ELECTROLYTE BALANCE 04/28/2025 11  7 - 17 mmol/L (calc) Final    CALCIUM 04/28/2025 8.6  8.6 - 10.3 mg/dL Final    WHITE BLOOD CELL COUNT 04/28/2025 9.1  3.8 - 10.8 Thousand/uL Final    RED BLOOD CELL COUNT 04/28/2025 4.55  4.20 - 5.80 Million/uL Final    HEMOGLOBIN 04/28/2025 13.2  13.2 - 17.1 g/dL Final    HEMATOCRIT 04/28/2025 41.2  38.5 - 50.0 % Final    MCV 04/28/2025 90.5  80.0 - 100.0 fL Final    MCH 04/28/2025 29.0  27.0 - 33.0 pg Final    MCHC 04/28/2025 32.0  32.0 - 36.0 g/dL Final    Comment: For adults, a slight decrease in the calculated MCHC  value (in the range of 30 to 32 g/dL) is most likely  not clinically significant; however, it should be  interpreted with caution in correlation with other  red cell parameters and the patient's clinical  condition.      RDW 04/28/2025 13.0  11.0 - 15.0 % Final    PLATELET COUNT 04/28/2025 257  140 - 400 Thousand/uL Final    MPV 04/28/2025 9.8  7.5 - 12.5 fL Final    INR 04/28/2025 1.0   Final    Comment: Reference Range                     0.9-1.1  Moderate-intensity Warfarin Therapy 2.0-3.0  Higher-intensity Warfarin Therapy   3.0-4.0          PT 04/28/2025 10.4  9.0 - 11.5 sec Final    Comment: For additional information, please refer to  http://education.Conjunct/faq/RSR547  (This link is being provided for informational/  educational purposes only.)      CULTURE, URINE, ROUTINE 04/28/2025 SEE NOTE (A)   Corrected    Comment:     CULTURE, URINE, ROUTINE         Micro Number:      [id number]  =>Test Status:       Addendum - Final    Specimen  Source:   Urine, clean catch    Specimen Quality:  Adequate    Result:            Aerococcus urinae                       Aerococcus urinae urine isolates usually respond                       to agents used to treat uncomplicated UTIs,                       including beta-lactams, nitrofurantoin, or                       trimethoprim-sulfamethoxazole. Contact the                       laboratory within 3 days if additional testing is                       needed (e.g., complicated or recurrent UTI, or                       treatment failure).    Comment:      ISOLATE SENT OUT FOR SUSCEPTABILITY        Review of Systems  All other systems have been reviewed and are negative for complaint.      Assessment and Plan   We discussed postoperative urinary retention in great detail. We discussed that different medications, including anesthesia can influence urinary retention. We discussed that postoperative constipation can also contribute to urinary retention. We discussed management of urinary retention to include indwelling catheter or CIC. We discussed risks of unmanaged urinary retention including irreversible kidney injury and increased risk of UTI. We discussed TOV today.    [] TOV today passed. 250 ml sterile water instilled. 260 ml clear output, no clots   [] Drink plenty of fluids to maintain hydration and clear urine output  [] You may have some irritative voiding symptoms over the next few days: burning, frequency, urgency  [] Activity restrictions reviewed  []Patient instructed to go to ED if unable to void in 4-6 hr or unable to void with urge     He will return to clinic in 3 months for post-operative visit with Dr. Hendricks, or sooner if needed.    All questions and concerns were addressed. Patient verbalizes understanding and has no other questions at this time.     Olive Hunter-- SARAH REILLY  Office Phone:  [phone]             [1]   Past Medical History:  Diagnosis Date    A-fib (Multi)     Atrial flutter  (Multi) 09/26/2023    Benign essential hypertension 06/10/2024    BPH (benign prostatic hyperplasia)     Colon polyp     COPD (chronic obstructive pulmonary disease) (Multi)     DM2 (diabetes mellitus, type 2) (Multi)     GERD (gastroesophageal reflux disease) 06/10/2024    Hyperlipidemia 06/10/2024    Hypothyroidism 06/10/2024    Irregular heart beat     Other chest pain 02/16/2021    Chest tightness or pressure    Personal history of other benign neoplasm     History of benign neoplasm of pharynx    Prostate cancer (Multi)     Sleep apnea     does not use device    Suspected sleep apnea 06/14/2024    Urinary tract infection    [2]   Past Surgical History:  Procedure Laterality Date    ABLATION OF DYSRHYTHMIC FOCUS      CARDIAC CATHETERIZATION  9/12/24    CARDIAC ELECTROPHYSIOLOGY PROCEDURE N/A 09/12/2024    Procedure: Ablation A-Fib;  Surgeon: Nickolas Bajwa MD;  Location: [facility] Card[facility];  Service: Electrophysiology;  Laterality: N/A;    COLONOSCOPY      OTHER SURGICAL HISTORY      Excision Of Tracheal Tumor    OTHER SURGICAL HISTORY  08/09/2018    Oral Surgery Tooth Extraction [place] [facility]   [3]   Current Facility-Administered Medications on File Prior to Visit   Medication Dose Route Frequency Provider Last Rate Last Admin    [COMPLETED] ciprofloxacin (Cipro) 400 mg in dextrose 5%  mL  400 mg intravenous Once Beth Hendricks MD   400 mg at 05/12/25 0855    dexAMETHasone (Decadron) injection   intravenous PRN Sarah Aranda APRN-CRNA   8 mg at 05/12/25 0912    fentaNYL PF (Sublimaze) injection   intravenous PRN Sarah Aranda APRN-CRNA   100 mcg at 05/12/25 0859    lactated Ringer's infusion  20 mL/hr intravenous Continuous Beth Hendricks MD   New Bag at 05/12/25 0855    lidocaine 2 % mucosal jelly (Uro-Jet)    PRN Beth Hendricks MD   1 Application at 05/12/25 1000    lidocaine PF (Xylocaine) 10 mg/mL (1 %) injection   epidural PRN Sarah Aranda APRN-CRNA   5 mL at 05/12/25 0859    midazolam (Versed)  injection   intravenous PRN Sarah N Skariot, APRN-CRNA   2 mg at 05/12/25 0855    ondansetron (Zofran) injection   intravenous PRN Sarah N Skariot, APRN-CRNA   4 mg at 05/12/25 0912    phenylephrine in NS (Braydon-Synephrine) 100 mcg/mL syringe   intravenous PRN Sarah N Skariot, APRN-CRNA   100 mcg at 05/12/25 0943    propofol (Diprivan) injection   intravenous PRN Sarah N Skariot, APRN-CRNA   200 mg at 05/12/25 0859    rocuronium (ZeMuron) injection   intravenous PRN Sarah N Skariot, APRN-CRNA   100 mg at 05/12/25 0859    sugammadex (Bridion) injection   intravenous PRN Sarah N Skariot, APRN-CRNA   200 mg at 05/12/25 1004    tranexamic acid (Cyklokapron) injection   intravenous PRN Sarah N Skariot, APRN-CRNA   1,000 mg at 05/12/25 0909     Current Outpatient Medications on File Prior to Visit   Medication Sig Dispense Refill    apixaban (Eliquis) 5 mg tablet Take 1 tablet (5 mg) by mouth 2 times a day. 180 tablet 3    fluticasone (Flonase) 50 mcg/actuation nasal spray Administer 1 spray into each nostril once daily. Shake gently. Before first use, prime pump. After use, clean tip and replace cap. 16 g 12    losartan (Cozaar) 50 mg tablet TAKE 1 TABLET BY MOUTH EVERY DAY 90 tablet 0    metFORMIN  mg 24 hr tablet Take 2 tablets (1,000 mg) by mouth 2 times daily (morning and late afternoon). 360 tablet 1    metoprolol tartrate (Lopressor) 25 mg tablet Take 1 tablet (25 mg) by mouth 2 times a day. 180 tablet 1    phenazopyridine (Pyridium) 200 mg tablet Take 1 tablet (200 mg) by mouth 3 times a day as needed for bladder spasms for up to 30 doses. 30 tablet 0    rosuvastatin (Crestor) 20 mg tablet TAKE 1 TABLET BY MOUTH EVERY DAY (Patient taking differently: Take 0.5 tablets (10 mg) by mouth once daily.) 90 tablet 0    sulfamethoxazole-trimethoprim (Bactrim DS) 800-160 mg tablet Take 1 tablet by mouth 2 times a day for 7 days. 14 tablet 0    sulfamethoxazole-trimethoprim (Bactrim DS) 800-160 mg tablet Take 1 tablet by mouth  2 times a day for 7 days. 14 tablet 0    tamsulosin (Flomax) 0.4 mg 24 hr capsule Take 1 capsule (0.4 mg) by mouth once daily. (Patient taking differently: Take 1 capsule (0.4 mg) by mouth once daily in the morning.) 30 capsule 11

## 2025-05-13 ENCOUNTER — APPOINTMENT (OUTPATIENT)
Dept: UROLOGY | Facility: CLINIC | Age: 65
End: 2025-05-13
Payer: COMMERCIAL

## 2025-05-13 DIAGNOSIS — N40.1 BPH ASSOCIATED WITH NOCTURIA: Primary | ICD-10-CM

## 2025-05-13 DIAGNOSIS — Z79.2 NEED FOR PROPHYLACTIC ANTIBIOTIC: ICD-10-CM

## 2025-05-13 DIAGNOSIS — R35.1 BPH ASSOCIATED WITH NOCTURIA: Primary | ICD-10-CM

## 2025-05-13 PROCEDURE — 3051F HG A1C>EQUAL 7.0%<8.0%: CPT | Performed by: NURSE PRACTITIONER

## 2025-05-13 PROCEDURE — 1159F MED LIST DOCD IN RCRD: CPT | Performed by: NURSE PRACTITIONER

## 2025-05-13 PROCEDURE — 99024 POSTOP FOLLOW-UP VISIT: CPT | Performed by: NURSE PRACTITIONER

## 2025-05-13 PROCEDURE — 4010F ACE/ARB THERAPY RXD/TAKEN: CPT | Performed by: NURSE PRACTITIONER

## 2025-05-13 PROCEDURE — 1160F RVW MEDS BY RX/DR IN RCRD: CPT | Performed by: NURSE PRACTITIONER

## 2025-05-13 PROCEDURE — 1036F TOBACCO NON-USER: CPT | Performed by: NURSE PRACTITIONER

## 2025-05-13 PROCEDURE — 51700 IRRIGATION OF BLADDER: CPT | Performed by: NURSE PRACTITIONER

## 2025-05-13 RX ORDER — LEVOFLOXACIN 500 MG/1
500 TABLET, FILM COATED ORAL DAILY
Qty: 7 TABLET | Refills: 0 | Status: SHIPPED | OUTPATIENT
Start: 2025-05-13 | End: 2025-05-20

## 2025-05-13 NOTE — PROGRESS NOTES
Trial of Void:    Patient here today for trial void. Patient verifed by name/.   Patient tolerated 350 cc of sterile water.  Catheter was removed and patient voided 300 cc.   Patient instructed to give the office a call with and issues or concerns, patient also instructed go to emergency room after office hours if unable to void.    Latasha Pastor MA

## 2025-05-28 LAB
LABORATORY COMMENT REPORT: NORMAL
PATH REPORT.FINAL DX SPEC: NORMAL
PATH REPORT.GROSS SPEC: NORMAL
PATH REPORT.RELEVANT HX SPEC: NORMAL
PATH REPORT.TOTAL CANCER: NORMAL

## 2025-06-14 ENCOUNTER — ANCILLARY PROCEDURE (OUTPATIENT)
Dept: URGENT CARE | Age: 65
End: 2025-06-14
Payer: MEDICARE

## 2025-06-14 ENCOUNTER — OFFICE VISIT (OUTPATIENT)
Dept: URGENT CARE | Age: 65
End: 2025-06-14
Payer: MEDICARE

## 2025-06-14 VITALS
TEMPERATURE: 97.9 F | DIASTOLIC BLOOD PRESSURE: 80 MMHG | WEIGHT: 315 LBS | BODY MASS INDEX: 45 KG/M2 | RESPIRATION RATE: 20 BRPM | OXYGEN SATURATION: 97 % | HEART RATE: 62 BPM | SYSTOLIC BLOOD PRESSURE: 141 MMHG

## 2025-06-14 DIAGNOSIS — M54.50 LUMBAR PAIN: ICD-10-CM

## 2025-06-14 DIAGNOSIS — M62.838 MUSCLE SPASM: ICD-10-CM

## 2025-06-14 DIAGNOSIS — M54.50 LUMBAR PAIN: Primary | ICD-10-CM

## 2025-06-14 DIAGNOSIS — M47.9 SPONDYLOSIS: ICD-10-CM

## 2025-06-14 PROCEDURE — 72100 X-RAY EXAM L-S SPINE 2/3 VWS: CPT

## 2025-06-14 RX ORDER — IBUPROFEN 800 MG/1
800 TABLET, FILM COATED ORAL 3 TIMES DAILY PRN
Qty: 30 TABLET | Refills: 0 | Status: SHIPPED | OUTPATIENT
Start: 2025-06-14 | End: 2025-06-24

## 2025-06-14 RX ORDER — CYCLOBENZAPRINE HCL 10 MG
10 TABLET ORAL 3 TIMES DAILY
Qty: 20 TABLET | Refills: 0 | Status: SHIPPED | OUTPATIENT
Start: 2025-06-14

## 2025-06-14 ASSESSMENT — PATIENT HEALTH QUESTIONNAIRE - PHQ9
1. LITTLE INTEREST OR PLEASURE IN DOING THINGS: NOT AT ALL
SUM OF ALL RESPONSES TO PHQ9 QUESTIONS 1 AND 2: 0
2. FEELING DOWN, DEPRESSED OR HOPELESS: NOT AT ALL

## 2025-06-14 ASSESSMENT — PAIN SCALES - GENERAL: PAINLEVEL_OUTOF10: 6

## 2025-06-14 NOTE — PROGRESS NOTES
Subjective   Patient ID: Eligio Hogue is a 65 y.o. male. They present today with a chief complaint of Injury (Back).  Patient has chronic lumbar pain without a known injury and without known reason.  He carried a 29 pound dog yesterday then he started to get a sharp pain in his lumbar area.  He denies lower extremity paresthesia, saddle anesthesia, bowel or urinary incontinence.  He did not take any medication to alleviate his symptoms yet.        Past Medical History  Allergies as of 06/14/2025 - Reviewed 06/14/2025   Allergen Reaction Noted    Penicillins Other 08/31/2023    Omeprazole Rash 04/05/2024       Prescriptions Prior to Admission[1]     Medical History[2]    Surgical History[3]     reports that he has quit smoking. His smoking use included cigarettes. He has never used smokeless tobacco. He reports that he does not drink alcohol and does not use drugs.    Review of Systems  Review of Systems                               Objective    Vitals:    06/14/25 1038   BP: 141/80   Pulse: 62   Resp: 20   Temp: 36.6 °C (97.9 °F)   SpO2: 97%   Weight: (!) 163 kg (360 lb)     No LMP for male patient.    Physical Exam  Vitals reviewed.   HENT:      Head: Normocephalic and atraumatic.      Nose: Nose normal.      Mouth/Throat:      Mouth: Mucous membranes are moist.   Eyes:      Extraocular Movements: Extraocular movements intact.      Conjunctiva/sclera: Conjunctivae normal.   Pulmonary:      Effort: Pulmonary effort is normal.   Musculoskeletal:      Cervical back: Normal.      Thoracic back: Normal.      Lumbar back: Tenderness and bony tenderness present.   Skin:     General: Skin is warm.   Neurological:      Mental Status: He is alert and oriented to person, place, and time.   Psychiatric:         Mood and Affect: Mood normal.         Behavior: Behavior normal.             Point of Care Test & Imaging Results from this visit  No results found for this visit on 06/14/25.   Imaging  XR lumbar spine 2-3  views  Result Date: 6/14/2025  Mild multilevel spondylosis and moderate facet disease. No acute abnormality     MACRO: None   Signed by: Luis Marcum 6/14/2025 11:35 AM Dictation workstation:   [id number]      Cardiology, Vascular, and Other Imaging  No other imaging results found for the past 2 days      Diagnostic study results (if any) were reviewed by Nick Escamilla PA-C.    Assessment/Plan   Allergies, medications, history, and pertinent labs/EKGs/Imaging reviewed by Nick Escamilla PA-C.     Medical Decision Making  Lumbar x-ray shows mild spondylolysis.  Ortho referral is requested.  Patient will start on ibuprofen 800 mg and cyclobenzaprine.  -         Patient is educated about their diagnoses.     -          Discussed medications benefits and adverse effects.     -          Answered all patient’s questions.     -          Patient will call 911 or go to the nearest ED if worsen symptoms .     -          Patient is agreeable to the plan of care and is deemed stable upon discharge.     -          Follow up with your primary care provider in two days.    Orders and Diagnoses  Diagnoses and all orders for this visit:  Lumbar pain  -     cyclobenzaprine (Flexeril) 10 mg tablet; Take 1 tablet (10 mg) by mouth 3 times a day.  -     ibuprofen 800 mg tablet; Take 1 tablet (800 mg) by mouth 3 times a day as needed for mild pain (1 - 3) (pain) for up to 10 days.  -     XR lumbar spine 2-3 views; Future  Muscle spasm  -     cyclobenzaprine (Flexeril) 10 mg tablet; Take 1 tablet (10 mg) by mouth 3 times a day.  -     ibuprofen 800 mg tablet; Take 1 tablet (800 mg) by mouth 3 times a day as needed for mild pain (1 - 3) (pain) for up to 10 days.  Spondylosis  -     Referral to Orthopedics and Sports Medicine; Future      Medical Admin Record      Patient disposition: Home    Electronically signed by Nick Escamilla PA-C  12:29 PM           [1] (Not in a hospital admission)   [2]   Past Medical History:  Diagnosis Date     A-fib (Multi)     Atrial flutter (Multi) 09/26/2023    Benign essential hypertension 06/10/2024    BPH (benign prostatic hyperplasia)     Colon polyp     COPD (chronic obstructive pulmonary disease) (Multi)     DM2 (diabetes mellitus, type 2) (Multi)     GERD (gastroesophageal reflux disease) 06/10/2024    Hyperlipidemia 06/10/2024    Hypothyroidism 06/10/2024    Irregular heart beat     Other chest pain 02/16/2021    Chest tightness or pressure    Personal history of other benign neoplasm     History of benign neoplasm of pharynx    Prostate cancer (Multi)     Sleep apnea     does not use device    Suspected sleep apnea 06/14/2024    Urinary tract infection    [3]   Past Surgical History:  Procedure Laterality Date    ABLATION OF DYSRHYTHMIC FOCUS      CARDIAC CATHETERIZATION  9/12/24    CARDIAC ELECTROPHYSIOLOGY PROCEDURE N/A 09/12/2024    Procedure: Ablation A-Fib;  Surgeon: Nickolas Bajwa MD;  Location: [facility] Cardiac Cath Lab;  Service: Electrophysiology;  Laterality: N/A;    COLONOSCOPY      OTHER SURGICAL HISTORY      Excision Of Tracheal Tumor    OTHER SURGICAL HISTORY  08/09/2018    Oral Surgery Tooth Extraction [facility] [facility]

## 2025-06-16 ENCOUNTER — TELEPHONE (OUTPATIENT)
Dept: RADIATION ONCOLOGY | Facility: HOSPITAL | Age: 65
End: 2025-06-16
Payer: MEDICARE

## 2025-06-17 ENCOUNTER — HOSPITAL ENCOUNTER (OUTPATIENT)
Dept: RADIATION ONCOLOGY | Facility: HOSPITAL | Age: 65
Setting detail: RADIATION/ONCOLOGY SERIES
Discharge: HOME | End: 2025-06-17
Payer: MEDICARE

## 2025-06-17 VITALS
OXYGEN SATURATION: 95 % | TEMPERATURE: 96.3 F | DIASTOLIC BLOOD PRESSURE: 90 MMHG | BODY MASS INDEX: 45.41 KG/M2 | WEIGHT: 315 LBS | HEART RATE: 66 BPM | RESPIRATION RATE: 18 BRPM | SYSTOLIC BLOOD PRESSURE: 163 MMHG

## 2025-06-17 DIAGNOSIS — C61 MALIGNANT NEOPLASM OF PROSTATE (MULTI): Primary | ICD-10-CM

## 2025-06-17 PROCEDURE — 99213 OFFICE O/P EST LOW 20 MIN: CPT | Performed by: STUDENT IN AN ORGANIZED HEALTH CARE EDUCATION/TRAINING PROGRAM

## 2025-06-17 PROCEDURE — G2211 COMPLEX E/M VISIT ADD ON: HCPCS | Performed by: STUDENT IN AN ORGANIZED HEALTH CARE EDUCATION/TRAINING PROGRAM

## 2025-06-17 ASSESSMENT — ENCOUNTER SYMPTOMS
PSYCHIATRIC NEGATIVE: 1
CONSTITUTIONAL NEGATIVE: 1
HEMATOLOGIC/LYMPHATIC NEGATIVE: 1
EYES NEGATIVE: 1
NEUROLOGICAL NEGATIVE: 1
BACK PAIN: 1
GASTROINTESTINAL NEGATIVE: 1
CARDIOVASCULAR NEGATIVE: 1
RESPIRATORY NEGATIVE: 1

## 2025-06-17 ASSESSMENT — PAIN SCALES - GENERAL: PAINLEVEL_OUTOF10: 6

## 2025-06-17 NOTE — PROGRESS NOTES
Radiation Oncology Nursing Note    IPSS (International Prostate Symptom Score):   2 / 35, bother 1  In the past month:   Incomplete Emptying - How often have you had the sensation of not emptying you bladder?   0 (not at all)  Frequency - How often have you had to urinate less than every 2 hours?   0 (not at all)  Intermittency - How often have you found you stopped and started again several times when you urinated?   0 (not at all)  Urgency - How often have you found it difficult to postpone urination?   2 (less than half the time)  Weak stream - How often have you had a weak urinary stream?   0 (not at all)  Straining - How often have you had to strain to start urination?   0 (not at all)  Nocturia - How many times did you typically get up at night to urinate?   0 (none)  Quality of Life due to Urinary Symptoms  If you were to spend the rest of your life with your urinary condition just the way it is now, how would you feel about that?   1 (Pleased)     - He is not experiencing urinary incontinence.      - He is not experiencing dysuria, hematuria, flank pain.     Sexual function:   - Quality of erections during the last 4 weeks: 3 = Firm enough for masturbation and foreplay only  - Use of erectile dysfunction medications:  None    Bowel function:    - Denies hematochezia or pain.     Current Systemic Treatment:  No     Pain: The patient's current pain level was assessed.  They report currently having a pain of 6 out of 10.  Recent back strain with lifting. Went to  clinic and given flexaril and Ibuprofen, pt notes improvement. They feel their pain is under control with the use of pain medications.     Review of Systems:  Review of Systems   Constitutional: Negative.    HENT:  Negative.     Eyes: Negative.    Respiratory: Negative.     Cardiovascular: Negative.    Gastrointestinal: Negative.    Genitourinary:          Please see IPSS assessment   Musculoskeletal:  Positive for back pain.   Skin: Negative.     Neurological: Negative.    Hematological: Negative.    Psychiatric/Behavioral: Negative.         Intermediate Repair Preamble Text (Leave Blank If You Do Not Want): Undermining was performed with blunt dissection.

## 2025-06-17 NOTE — PROGRESS NOTES
Staff Physician: Esthela Saleh MD PhD  Date of Service: 6/17/2025  Patient name: Eligio Hogue   MRN: [id number]    RADIATION ONCOLOGY FOLLOW UP NOTE    IDENTIFYING DATA:  DIAGNOSIS: Newly diagnosed, localized   Cancer Staging   Malignant neoplasm of prostate (Multi)  Staging form: Prostate, AJCC 8th Edition  - Clinical stage from 12/17/2024: cT1c, cN0, PSA: 2.2, Grade Group: 2 - Signed by Esthela Saleh MD PhD on 1/21/2025    DISEASE STATE: No prior treatment  DISEASE STATUS: Awaiting treatment, s/p urinary outlet procedure    He was last seen in Radiation Oncology on 1/21/25 and returns today for follow-up.    Oncologic History:  9/8/2023 PSA 1.78  6/17/2024 PSA 2.19  8/30/2024 Presented to Dr. Hendricks for urinary retention, PVR was 2.2 L   9/20/2024 MRI prostate in the setting of urinary retention notes 44.8 g gland, PI-RADS 4 lesion in the left PZ at mid gland, broad-based capsular abutment along capsule margin suspicious for PAUL, no SV invasion.  12/17/2024 systematic and targeted biopsy noted GG2, 1/12 cores+ in the left posterior lateral, 1/1 targeted core+, PNI+, Decipher 0.93    Interval History:  5/12/25 HoLEP with Dr. Hendricks, pathology negative for malignancy   5/13/25 TOV passed    Today, he presents by himself and reports feeling well. He endorses improvement in his urinary symptoms since his outlet procedure on 5/12. He endorses increase in urinary urgency, occasional dysuria towards the end of his urinary stream, and occasional pink tint of urine towards the end of his stream that is improving. Denies pushing or straining to void and states he feels he empties his bladder when voiding. He states that he is better able to empty his bladder and voids larger volumes of urine more frequently since his HoLEP.  His IPSS score was 7 with urge incontinence and pad use at time of initial consult in January. Today, his IPSS score is 2 with urgency only. He no longer uses pads.    Pain score: 0/10     A 10 point  "review of systems was reviewed with pertinent positives and negatives noted in HPI. All other systems have been reviewed and are negative.    PERFORMANCE STATUS:  Karnofsky Performance Score/ECO, Fully active, able to carry on all pre-disease performed without restriction (ECOG equivalent 0)    PHYSICAL EXAMINATION:  /90   Pulse 66   Temp 35.7 °C (96.3 °F) (Temporal)   Resp 18   Wt (!) 165 kg (363 lb 4.8 oz)   SpO2 95%   BMI 45.41 kg/m²   Constitutional: well developed, no distress, alert & oriented, cooperative  Eyes: pupils equal round and reactive to light, extraocular movements intact  Respiratory: normal work of breathing    CTCAE (v5) ADVERSE EVENTS:  Toxicity Assessment          2025    19:00   Toxicity Assessment   Adverse Events Reviewed (WDL) Yes (Within Defined Limits)   Treatment Site Prostate RT   Hematuria Grade 1   Urinary Incontinence Grade 0   Urinary Frequency Grade 0   Urinary Retention Grade 0   Urinary Tract Obstruction Grade 3       s/p HoLEP on 2025   Urinary Tract Pain Grade 0   Urinary Urgency Grade 1     DIAGNOSTIC REPORTS REVIEWED:  Imaging: No new imaging to review    Laboratory/Pathology:  All pertinent labs and pathology were personally reviewed and interpreted in clinic. Findings as per interval history and EMR.  Lab Results   Component Value Date    PSA 2.19 2024    PSA 1.78 2023    PSASCREEN 2.22 2025    PSASCREEN 1.84 2022    PSASCREEN 1.53 2021    PSASCREEN 1.52 2019     No results found for: \"TESTOSTERONE\", \"TESTOTOTMS\"  Lab Results   Component Value Date    BUN 18 2025    CREATININE 1.16 2025    EGFR 69 2025     2025    K 5.1 2025     2025    CO2 25 2025    CALCIUM 8.6 2025    HGBA1C 7.6 (H) 2025        IMPRESSION:  65 year-old gentleman with favorable intermediate risk prostate adenocarcinoma, iPSA 2.19, GG2 ( cores+ in the left posterior lateral, " 1/1 targeted core+, PNI+), Decipher 0.93.   He is s/p HoLEP 5/12/25 for LUTS and endorses improvement of his LUTS and resolution of urinary retention.     We reviewed our recommendations for definitive radiation as discussed during his initial consult in January. We recommend starting radiation after at least two months have elapsed since his HoLEP. We reviewed CT simulation, the use of daily image guidance to improve prostate localization, and the recommendation of fiducials and spacer gel placement to reduce potential toxic rectal toxicities. Potential acute and long-term side effects of radiation were discussed, including acute and late side effects, including toxicities to bladder, rectum, bowel, urethra, erectile dysfunction, infertility and risk of secondary malignancy. The patient wishes to proceed with radiation and provided informed consent today.    Plan  - follow up with Dr. Hendricks on 8/18  - will schedule spaceOAR and fiducials with Dr. Rubio in August  - plan for CT simulation and MR prostate same day at [facility]. He will let us know if he prefers treatment at [facility] (20fx) or [facility] (5fx)    The patient was assessed and plan discussed with the attending radiation oncologist Dr. Saleh.    Renu Lara MD  PGY-3 Radiation Oncology Resident  On-call pager [phone]  Available on Epic Secure Chat    ---  I personally saw and evaluated the patient. I personally obtained key and critical portions of the history and physical exam and personally reviewed and interpreted imaging and laboratory data. I reviewed and edited the resident's documentation, and discussed the patient with the resident. I agree with the Dr. Lara's plan as documented above.     Esthela Salhe MD PhD  , Radiation Oncology

## 2025-06-18 DIAGNOSIS — C61 PROSTATE CANCER (MULTI): ICD-10-CM

## 2025-06-25 NOTE — ANESTHESIA PROCEDURE NOTES
Arterial Line:    Date/Time: 9/12/2024 8:43 AM    Staffing  Performed: ABDOULAYE   Authorized by: Josephine Yusuf MD    Performed by: ABDOULAYE Samaniego    An arterial line was placed. in the OR for the following indication(s): continuous blood pressure monitoring.    A 20 gauge (size), 1 and 3/4 inch (length), Angiocath (type) catheter was placed into the Right radial artery, secured by Tegaderm,   Seldinger technique not used.  Events:  patient tolerated procedure well with no complications.             Called into pharmacy per order of Cain BONNER.

## 2025-06-27 ENCOUNTER — APPOINTMENT (OUTPATIENT)
Dept: ORTHOPEDIC SURGERY | Facility: CLINIC | Age: 65
End: 2025-06-27
Payer: MEDICARE

## 2025-06-27 DIAGNOSIS — M54.50 CHRONIC BILATERAL LOW BACK PAIN WITHOUT SCIATICA: ICD-10-CM

## 2025-06-27 DIAGNOSIS — G89.29 CHRONIC BILATERAL LOW BACK PAIN WITHOUT SCIATICA: ICD-10-CM

## 2025-06-27 DIAGNOSIS — M47.9 SPONDYLOSIS: ICD-10-CM

## 2025-06-27 PROCEDURE — 3051F HG A1C>EQUAL 7.0%<8.0%: CPT | Performed by: STUDENT IN AN ORGANIZED HEALTH CARE EDUCATION/TRAINING PROGRAM

## 2025-06-27 PROCEDURE — 1125F AMNT PAIN NOTED PAIN PRSNT: CPT | Performed by: STUDENT IN AN ORGANIZED HEALTH CARE EDUCATION/TRAINING PROGRAM

## 2025-06-27 PROCEDURE — 1159F MED LIST DOCD IN RCRD: CPT | Performed by: STUDENT IN AN ORGANIZED HEALTH CARE EDUCATION/TRAINING PROGRAM

## 2025-06-27 PROCEDURE — 99203 OFFICE O/P NEW LOW 30 MIN: CPT | Performed by: STUDENT IN AN ORGANIZED HEALTH CARE EDUCATION/TRAINING PROGRAM

## 2025-06-27 PROCEDURE — 1036F TOBACCO NON-USER: CPT | Performed by: STUDENT IN AN ORGANIZED HEALTH CARE EDUCATION/TRAINING PROGRAM

## 2025-06-27 PROCEDURE — 99212 OFFICE O/P EST SF 10 MIN: CPT | Performed by: STUDENT IN AN ORGANIZED HEALTH CARE EDUCATION/TRAINING PROGRAM

## 2025-06-27 PROCEDURE — 4010F ACE/ARB THERAPY RXD/TAKEN: CPT | Performed by: STUDENT IN AN ORGANIZED HEALTH CARE EDUCATION/TRAINING PROGRAM

## 2025-06-27 ASSESSMENT — PAIN - FUNCTIONAL ASSESSMENT: PAIN_FUNCTIONAL_ASSESSMENT: 0-10

## 2025-06-27 ASSESSMENT — PAIN SCALES - GENERAL: PAINLEVEL_OUTOF10: 1

## 2025-06-27 NOTE — PROGRESS NOTES
Orthopaedic Spine Surgery Clinic Note    Patient ID: Eligio Hogue is a 65 y.o. male.    Chief Complaint  Chief Complaint   Patient presents with    Spine - Pain       History of Present Illness  Eligio Hogue is a pleasant 65-year-old male presenting with his wife for initial evaluation of acute on chronic low back pain. He has dealt with chronic persistent low back pain aching in nature, with a few flares over the years.  He endorses a history of bulging disks in his low back many years ago. He had significant relief at that time with physical therapy. Most recently on 6/13/2025 he was sitting in a chair when he bent over to  his dog and experienced significant low back pain.  His symptoms worsened with rolling over in bed and going from a sitting to standing position.  He was evaluated in urgent care the following day and provided with ibuprofen and Flexeril for his symptoms.  Over the past 2 weeks his symptoms have significantly improved, however still bothersome to him.  He denies any numbness or tingling of the bilateral lower extremities.  He denies any recent change in bowel or bladder control, although did have issues with urinary retention and is currently undergoing radiation for prostate cancer and had a recent enucleation procedure in May which helped improve his retention.     Prior treatments:  Lumbar spine PT - remote past    Relevant PMH/PSH for spine  DM (last A1c 7.6 on 5/2/2025)  BMI 45.41    Medical History[1]    Surgical History[2]    Social History[3]    Family History[4]    Allergies[5]    Current Outpatient Medications   Medication Instructions    apixaban (ELIQUIS) 5 mg, oral, 2 times daily    cyclobenzaprine (FLEXERIL) 10 mg, oral, 3 times daily    fluticasone (Flonase) 50 mcg/actuation nasal spray 1 spray, Each Nostril, Daily, Shake gently. Before first use, prime pump. After use, clean tip and replace cap.    losartan (COZAAR) 50 mg, oral, Daily    metFORMIN XR (GLUCOPHAGE-XR)  1,000 mg, oral, 2 times daily (morning and late afternoon)    metoprolol tartrate (LOPRESSOR) 25 mg, oral, 2 times daily    phenazopyridine (PYRIDIUM) 200 mg, oral, 3 times daily PRN    rosuvastatin (CRESTOR) 20 mg, oral, Daily         Ortho Exam  General: AO x 3, NAD  Cardio: examined extremities perfused by peripheral palpation  Resp: breathing unlabored  Gait: within normal limits, non-antalgic  Tenderness: moderate tenderness to palpation of diffuse low back including lumbar spine, facet joints, and paraspinal musculature    Lower Extremity  Right  Left  Iliopsoas  5/5  5/5  Quadriceps  5/5  5/5  Tibialis anterior  5/5  5/5  EHL   5/5  5/5  Gastrocnemius  5/5  5/5    Sensation: Normal to light touch throughout lower extremities bilaterally.    Reflexes:  Right   Left  Q  1+  1+  A   1+   1+    Clonus: negative  Straight leg raise: negative      Diagnostic Results - Imaging    XR lumbar spine, 6/14/2025  FINDINGS:  Lumbar spine, five views      There is moderate facet disease in the lower lumbar spine. There is  mild multilevel disc space narrowing osteophytosis throughout the  lumbar spine as well. No fracture or spondylolisthesis seen      IMPRESSION:  Mild multilevel spondylosis and moderate facet disease. No acute  abnormality      Diagnosis  Encounter Diagnoses   Name Primary?    Spondylosis     Chronic bilateral low back pain without sciatica           Assessment/Plan  Eligio Hogue is a pleasant 65-year-old male presenting for initial evaluation of acute on chronic low back pain. Patient relates pain in a band-like distribution across his lower back. He has had similar pains many years ago that improved with physical therapy.  However, his symptoms flared approximately 2 weeks ago when picking up his dog.  He was seen in urgent care the next day and provided with ibuprofen and Flexeril which significantly improved his symptoms. Overall over the past 2 weeks he has seen significant improvement in his  symptoms, but is having some lingering local low back pain.  He is not currently taking any medications for pain.  He denies any radicular symptoms or issues with bowel or bladder control. We reviewed his XRs today in the office which demonstrated no acute osseous abnormality with chronic degenerative changes.     I had an extensive discussion in the office today with the patient with regards to his symptoms, his imaging findings, and possible management options.  We discussed how his chronic low back pain may relate to some element of degenerative change versus a muscular source.  He does not seem to exhibit much in the form of lower extremity radiculopathy.  He does have a history of urinary retention which was treated with a prostate enucleation procedure and he has radiation set up to help treat this in the coming weeks.  His urinary retention has improved.  I did recommend he continue with his treatment for his prostate.  In the meantime, I did recommend a referral to physical therapy for a low back and lower extremity stretching and strengthening program.  We did place an order in the system for him and instructed him on how to schedule this.  I also recommended aquatic therapy.    We did spend some time discussing weight loss strategies and discussed how his elevated BMI does place him at risk for chronic low back pain and degeneration.  Patient will follow-up with our office on an as-needed basis if his symptoms fail to improve or worsen with physical therapy at which point we can proceed with an MRI of the lumbar spine for further evaluation.      F/U LULU Herrera PA-C  Orthopedic Spine Surgery        I reviewed the PA's documentation and discussed the patient with the PA. I agree with the PA's medical decision making as documented in the note.       --    Fabian Rojas MD  Orthopaedic Spine Surgery  , Department of Orthopaedic Surgery  [facility]           [1]   Past Medical History:  Diagnosis Date    A-fib (Multi)     Atrial flutter (Multi) 09/26/2023    Benign essential hypertension 06/10/2024    BPH (benign prostatic hyperplasia)     Colon polyp     COPD (chronic obstructive pulmonary disease) (Multi)     DM2 (diabetes mellitus, type 2) (Multi)     GERD (gastroesophageal reflux disease) 06/10/2024    Hyperlipidemia 06/10/2024    Hypothyroidism 06/10/2024    Irregular heart beat     Other chest pain 02/16/2021    Chest tightness or pressure    Personal history of other benign neoplasm     History of benign neoplasm of pharynx    Prostate cancer (Multi)     Sleep apnea     does not use device    Suspected sleep apnea 06/14/2024    Urinary tract infection    [2]   Past Surgical History:  Procedure Laterality Date    ABLATION OF DYSRHYTHMIC FOCUS      CARDIAC CATHETERIZATION  9/12/24    CARDIAC ELECTROPHYSIOLOGY PROCEDURE N/A 09/12/2024    Procedure: Ablation A-Fib;  Surgeon: Nickolas Bajwa MD;  Location: [facility] Cardiac Cath Lab;  Service: Electrophysiology;  Laterality: N/A;    COLONOSCOPY      OTHER SURGICAL HISTORY      Excision Of Tracheal Tumor    OTHER SURGICAL HISTORY  08/09/2018    Oral Surgery Tooth Extraction [facility]   [3]   Social History  Socioeconomic History    Marital status:    Tobacco Use    Smoking status: Former     Types: Cigarettes    Smokeless tobacco: Never    Tobacco comments:     Patient denies adverse reactions with anesthesia, patient denies family issues with anesthesia.    Vaping Use    Vaping status: Never Used   Substance and Sexual Activity    Alcohol use: Never    Drug use: Never    Sexual activity: Defer   [4]   Family History  Problem Relation Name Age of Onset    Heart disease Mother Lupe De La Cruz         valve    Kidney disease Mother Lupe De La Cruz     Diabetes Sister Heide     Endometrial cancer Sister Heide     Diabetes type II Sister Heide     Colon cancer Sister Heide     Diabetes Brother Emile  Lennie     Alcohol abuse Brother Emile Hogue     Cancer Brother Emile Hogue    [5]   Allergies  Allergen Reactions    Penicillins Other     Blisters in mouth      Omeprazole Rash     Patient unsure of reaction

## 2025-07-10 ENCOUNTER — APPOINTMENT (OUTPATIENT)
Dept: PRIMARY CARE | Facility: CLINIC | Age: 65
End: 2025-07-10
Payer: COMMERCIAL

## 2025-07-10 VITALS
SYSTOLIC BLOOD PRESSURE: 138 MMHG | WEIGHT: 315 LBS | HEIGHT: 75 IN | BODY MASS INDEX: 39.17 KG/M2 | DIASTOLIC BLOOD PRESSURE: 84 MMHG

## 2025-07-10 DIAGNOSIS — E78.2 MIXED HYPERLIPIDEMIA: ICD-10-CM

## 2025-07-10 DIAGNOSIS — Z00.00 ROUTINE GENERAL MEDICAL EXAMINATION AT HEALTH CARE FACILITY: ICD-10-CM

## 2025-07-10 DIAGNOSIS — Z00.00 ENCOUNTER FOR PREVENTIVE HEALTH EXAMINATION: ICD-10-CM

## 2025-07-10 DIAGNOSIS — G47.33 OSA ON CPAP: ICD-10-CM

## 2025-07-10 DIAGNOSIS — E11.69 TYPE 2 DIABETES MELLITUS WITH OTHER SPECIFIED COMPLICATION, WITHOUT LONG-TERM CURRENT USE OF INSULIN: ICD-10-CM

## 2025-07-10 DIAGNOSIS — D56.1 BETA THALASSEMIA (MULTI): ICD-10-CM

## 2025-07-10 DIAGNOSIS — I10 PRIMARY HYPERTENSION: ICD-10-CM

## 2025-07-10 DIAGNOSIS — C61 PROSTATE CANCER (MULTI): ICD-10-CM

## 2025-07-10 DIAGNOSIS — Z00.00 WELLNESS EXAMINATION: Primary | ICD-10-CM

## 2025-07-10 DIAGNOSIS — I48.0 PAROXYSMAL ATRIAL FIBRILLATION (MULTI): ICD-10-CM

## 2025-07-10 PROCEDURE — 1170F FXNL STATUS ASSESSED: CPT | Performed by: STUDENT IN AN ORGANIZED HEALTH CARE EDUCATION/TRAINING PROGRAM

## 2025-07-10 PROCEDURE — 1036F TOBACCO NON-USER: CPT | Performed by: STUDENT IN AN ORGANIZED HEALTH CARE EDUCATION/TRAINING PROGRAM

## 2025-07-10 PROCEDURE — 4010F ACE/ARB THERAPY RXD/TAKEN: CPT | Performed by: STUDENT IN AN ORGANIZED HEALTH CARE EDUCATION/TRAINING PROGRAM

## 2025-07-10 PROCEDURE — 3008F BODY MASS INDEX DOCD: CPT | Performed by: STUDENT IN AN ORGANIZED HEALTH CARE EDUCATION/TRAINING PROGRAM

## 2025-07-10 PROCEDURE — 99397 PER PM REEVAL EST PAT 65+ YR: CPT | Performed by: STUDENT IN AN ORGANIZED HEALTH CARE EDUCATION/TRAINING PROGRAM

## 2025-07-10 PROCEDURE — 1160F RVW MEDS BY RX/DR IN RCRD: CPT | Performed by: STUDENT IN AN ORGANIZED HEALTH CARE EDUCATION/TRAINING PROGRAM

## 2025-07-10 PROCEDURE — 3079F DIAST BP 80-89 MM HG: CPT | Performed by: STUDENT IN AN ORGANIZED HEALTH CARE EDUCATION/TRAINING PROGRAM

## 2025-07-10 PROCEDURE — G0439 PPPS, SUBSEQ VISIT: HCPCS | Performed by: STUDENT IN AN ORGANIZED HEALTH CARE EDUCATION/TRAINING PROGRAM

## 2025-07-10 PROCEDURE — 99214 OFFICE O/P EST MOD 30 MIN: CPT | Performed by: STUDENT IN AN ORGANIZED HEALTH CARE EDUCATION/TRAINING PROGRAM

## 2025-07-10 PROCEDURE — 1159F MED LIST DOCD IN RCRD: CPT | Performed by: STUDENT IN AN ORGANIZED HEALTH CARE EDUCATION/TRAINING PROGRAM

## 2025-07-10 PROCEDURE — 3051F HG A1C>EQUAL 7.0%<8.0%: CPT | Performed by: STUDENT IN AN ORGANIZED HEALTH CARE EDUCATION/TRAINING PROGRAM

## 2025-07-10 PROCEDURE — 3075F SYST BP GE 130 - 139MM HG: CPT | Performed by: STUDENT IN AN ORGANIZED HEALTH CARE EDUCATION/TRAINING PROGRAM

## 2025-07-10 RX ORDER — METOPROLOL TARTRATE 25 MG/1
25 TABLET, FILM COATED ORAL 2 TIMES DAILY
Qty: 180 TABLET | Refills: 1 | Status: SHIPPED | OUTPATIENT
Start: 2025-07-10

## 2025-07-10 ASSESSMENT — ACTIVITIES OF DAILY LIVING (ADL)
TAKING_MEDICATION: INDEPENDENT
BATHING: INDEPENDENT
DOING_HOUSEWORK: INDEPENDENT
GROCERY_SHOPPING: INDEPENDENT
DRESSING: INDEPENDENT
MANAGING_FINANCES: INDEPENDENT

## 2025-07-10 ASSESSMENT — ENCOUNTER SYMPTOMS
DEPRESSION: 0
LOSS OF SENSATION IN FEET: 0
OCCASIONAL FEELINGS OF UNSTEADINESS: 0

## 2025-07-10 NOTE — PROGRESS NOTES
"Subjective   Patient ID: Eligio Hogue is a 65 y.o. male who presents for Medicare Annual Wellness Visit Subsequent.    [facility]   Routine fu, Wellness exam, and yearly physical.    Since our last visit, patient had HOLEP procedure. He is planning to start radiation treatment for prostate cancer.    He feels well today.  Review of Systems  12-point ROS reviewed and was negative unless otherwise noted in HPI.    Objective   /84   Ht 1.905 m (6' 3\")   Wt (!) 163 kg (360 lb)   BMI 45.00 kg/m²     Physical Exam  GEN: conversant, NAD  HEENT: PERRL, EOMI, MMM  NECK: supple, no carotid bruits appreciated b/l  CV: S1, S2, RRR  PULM: CTAB  ABD: soft, NT, obese  NEURO: no new gross focal deficits  EXT: no sig LE edema  PSYCH: appropriate affect    Assessment/Plan     #prostatic adenocarcinoma/urinary retention: Follows with urology. S/p HOLEP. Planning for XRT.     #DM2: Advised starting GLP-1 med, he declines for now. Continue metformin to 1000mg BID.      #FLOYD: Following w Sleep Med.     #Afib: Following with Dr Hernandez for Cardiology, S/P Ablation for Afib 9/12/2024. C/b bleed s/p Protamine administration.     #fatigue/Severe obesity, class 3: Advised increased efforts at diet, exercise, weight loss.      #HTN: continue losartan     #HLD: continue statin     #Elevated TSH: T4 within normal limits, will follow     #Health maintenance: colonoscopy completed 1/2025 per GI repeat in 1 year. He says that he has had AAA screening. Advised Shingrix, yearly flu shot. Pneumovax given 2023. Interval records, labs reviewed. Questions addressed.     RTC 4 mo     "
never used

## 2025-07-18 DIAGNOSIS — I10 PRIMARY HYPERTENSION: ICD-10-CM

## 2025-07-18 RX ORDER — LOSARTAN POTASSIUM 50 MG/1
50 TABLET ORAL DAILY
Qty: 90 TABLET | Refills: 0 | Status: SHIPPED | OUTPATIENT
Start: 2025-07-18

## 2025-07-28 ENCOUNTER — HOSPITAL ENCOUNTER (OUTPATIENT)
Dept: RADIOLOGY | Facility: CLINIC | Age: 65
Discharge: HOME | End: 2025-07-28
Payer: MEDICARE

## 2025-07-28 DIAGNOSIS — I71.21 ANEURYSM OF ASCENDING AORTA WITHOUT RUPTURE: ICD-10-CM

## 2025-07-28 PROCEDURE — 71250 CT THORAX DX C-: CPT

## 2025-07-28 PROCEDURE — 71250 CT THORAX DX C-: CPT | Performed by: RADIOLOGY

## 2025-07-30 DIAGNOSIS — I48.0 PAROXYSMAL ATRIAL FIBRILLATION (MULTI): ICD-10-CM

## 2025-08-18 ENCOUNTER — APPOINTMENT (OUTPATIENT)
Dept: UROLOGY | Facility: CLINIC | Age: 65
End: 2025-08-18
Payer: COMMERCIAL

## 2025-08-18 VITALS — BODY MASS INDEX: 45 KG/M2 | HEIGHT: 75 IN | TEMPERATURE: 97.6 F

## 2025-08-18 DIAGNOSIS — R35.1 BPH ASSOCIATED WITH NOCTURIA: Primary | ICD-10-CM

## 2025-08-18 DIAGNOSIS — N40.1 BPH ASSOCIATED WITH NOCTURIA: Primary | ICD-10-CM

## 2025-08-18 PROCEDURE — 99213 OFFICE O/P EST LOW 20 MIN: CPT | Performed by: UROLOGY

## 2025-08-18 PROCEDURE — G2211 COMPLEX E/M VISIT ADD ON: HCPCS | Performed by: UROLOGY

## 2025-08-18 PROCEDURE — 51798 US URINE CAPACITY MEASURE: CPT | Performed by: UROLOGY

## 2025-08-18 PROCEDURE — 4010F ACE/ARB THERAPY RXD/TAKEN: CPT | Performed by: UROLOGY

## 2025-08-18 PROCEDURE — 1126F AMNT PAIN NOTED NONE PRSNT: CPT | Performed by: UROLOGY

## 2025-08-18 PROCEDURE — 1159F MED LIST DOCD IN RCRD: CPT | Performed by: UROLOGY

## 2025-08-18 PROCEDURE — 51741 ELECTRO-UROFLOWMETRY FIRST: CPT | Performed by: UROLOGY

## 2025-08-18 ASSESSMENT — PAIN SCALES - GENERAL: PAINLEVEL_OUTOF10: 0-NO PAIN

## 2025-08-19 ENCOUNTER — APPOINTMENT (OUTPATIENT)
Dept: UROLOGY | Facility: CLINIC | Age: 65
End: 2025-08-19
Payer: COMMERCIAL

## 2025-08-25 ENCOUNTER — HOSPITAL ENCOUNTER (OUTPATIENT)
Dept: RADIOLOGY | Facility: HOSPITAL | Age: 65
Discharge: HOME | End: 2025-08-25
Payer: MEDICARE

## 2025-08-25 DIAGNOSIS — C61 MALIGNANT NEOPLASM OF PROSTATE (MULTI): ICD-10-CM

## 2025-08-27 ENCOUNTER — ANESTHESIA (OUTPATIENT)
Dept: OPERATING ROOM | Facility: CLINIC | Age: 65
End: 2025-08-27
Payer: MEDICARE

## 2025-08-27 ENCOUNTER — HOSPITAL ENCOUNTER (OUTPATIENT)
Facility: CLINIC | Age: 65
Setting detail: OUTPATIENT SURGERY
Discharge: HOME | End: 2025-08-27
Attending: STUDENT IN AN ORGANIZED HEALTH CARE EDUCATION/TRAINING PROGRAM | Admitting: STUDENT IN AN ORGANIZED HEALTH CARE EDUCATION/TRAINING PROGRAM
Payer: MEDICARE

## 2025-08-27 ENCOUNTER — ANESTHESIA EVENT (OUTPATIENT)
Dept: OPERATING ROOM | Facility: CLINIC | Age: 65
End: 2025-08-27
Payer: MEDICARE

## 2025-08-27 VITALS
SYSTOLIC BLOOD PRESSURE: 134 MMHG | RESPIRATION RATE: 16 BRPM | WEIGHT: 315 LBS | HEIGHT: 75 IN | OXYGEN SATURATION: 96 % | BODY MASS INDEX: 39.17 KG/M2 | HEART RATE: 75 BPM | TEMPERATURE: 97.2 F | DIASTOLIC BLOOD PRESSURE: 69 MMHG

## 2025-08-27 DIAGNOSIS — C61 PROSTATE CANCER (MULTI): ICD-10-CM

## 2025-08-27 LAB — GLUCOSE BLD MANUAL STRIP-MCNC: 195 MG/DL (ref 74–99)

## 2025-08-27 PROCEDURE — 55874 TPRNL PLMT BIODEGRDABL MATRL: CPT | Performed by: STUDENT IN AN ORGANIZED HEALTH CARE EDUCATION/TRAINING PROGRAM

## 2025-08-27 PROCEDURE — 2500000005 HC RX 250 GENERAL PHARMACY W/O HCPCS: Performed by: STUDENT IN AN ORGANIZED HEALTH CARE EDUCATION/TRAINING PROGRAM

## 2025-08-27 PROCEDURE — 3600000009 HC OR TIME - EACH INCREMENTAL 1 MINUTE - PROCEDURE LEVEL FOUR: Performed by: STUDENT IN AN ORGANIZED HEALTH CARE EDUCATION/TRAINING PROGRAM

## 2025-08-27 PROCEDURE — 7100000009 HC PHASE TWO TIME - INITIAL BASE CHARGE: Performed by: STUDENT IN AN ORGANIZED HEALTH CARE EDUCATION/TRAINING PROGRAM

## 2025-08-27 PROCEDURE — 2500000004 HC RX 250 GENERAL PHARMACY W/ HCPCS (ALT 636 FOR OP/ED): Mod: JZ,TB | Performed by: ANESTHESIOLOGIST ASSISTANT

## 2025-08-27 PROCEDURE — C1889 IMPLANT/INSERT DEVICE, NOC: HCPCS | Performed by: STUDENT IN AN ORGANIZED HEALTH CARE EDUCATION/TRAINING PROGRAM

## 2025-08-27 PROCEDURE — 7100000010 HC PHASE TWO TIME - EACH INCREMENTAL 1 MINUTE: Performed by: STUDENT IN AN ORGANIZED HEALTH CARE EDUCATION/TRAINING PROGRAM

## 2025-08-27 PROCEDURE — 55876 PLACE RT DEVICE/MARKER PROS: CPT | Performed by: STUDENT IN AN ORGANIZED HEALTH CARE EDUCATION/TRAINING PROGRAM

## 2025-08-27 PROCEDURE — 76872 US TRANSRECTAL: CPT | Performed by: STUDENT IN AN ORGANIZED HEALTH CARE EDUCATION/TRAINING PROGRAM

## 2025-08-27 PROCEDURE — 2500000004 HC RX 250 GENERAL PHARMACY W/ HCPCS (ALT 636 FOR OP/ED): Performed by: ANESTHESIOLOGIST ASSISTANT

## 2025-08-27 PROCEDURE — A55876 PR PLACE RADIOTHER DEVICE/MARKER, PROSTATE: Performed by: ANESTHESIOLOGIST ASSISTANT

## 2025-08-27 PROCEDURE — 2780000003 HC OR 278 NO HCPCS: Performed by: STUDENT IN AN ORGANIZED HEALTH CARE EDUCATION/TRAINING PROGRAM

## 2025-08-27 PROCEDURE — 3700000002 HC GENERAL ANESTHESIA TIME - EACH INCREMENTAL 1 MINUTE: Performed by: STUDENT IN AN ORGANIZED HEALTH CARE EDUCATION/TRAINING PROGRAM

## 2025-08-27 PROCEDURE — 82947 ASSAY GLUCOSE BLOOD QUANT: CPT

## 2025-08-27 PROCEDURE — A55876 PR PLACE RADIOTHER DEVICE/MARKER, PROSTATE: Performed by: ANESTHESIOLOGY

## 2025-08-27 PROCEDURE — 3600000004 HC OR TIME - INITIAL BASE CHARGE - PROCEDURE LEVEL FOUR: Performed by: STUDENT IN AN ORGANIZED HEALTH CARE EDUCATION/TRAINING PROGRAM

## 2025-08-27 PROCEDURE — 3700000001 HC GENERAL ANESTHESIA TIME - INITIAL BASE CHARGE: Performed by: STUDENT IN AN ORGANIZED HEALTH CARE EDUCATION/TRAINING PROGRAM

## 2025-08-27 DEVICE — GOLD MARKERS, 1.2MM, SOFT TISSUE, 20CM 17GA NEEDLES, 3-PK, STRL: Type: IMPLANTABLE DEVICE | Site: PROSTATE | Status: FUNCTIONAL

## 2025-08-27 RX ORDER — OXYCODONE HYDROCHLORIDE 5 MG/1
10 TABLET ORAL EVERY 4 HOURS PRN
Status: DISCONTINUED | OUTPATIENT
Start: 2025-08-27 | End: 2025-08-27 | Stop reason: HOSPADM

## 2025-08-27 RX ORDER — SODIUM CHLORIDE 9 MG/ML
INJECTION, SOLUTION INTRAMUSCULAR; INTRAVENOUS; SUBCUTANEOUS AS NEEDED
Status: DISCONTINUED | OUTPATIENT
Start: 2025-08-27 | End: 2025-08-27 | Stop reason: HOSPADM

## 2025-08-27 RX ORDER — LIDOCAINE HYDROCHLORIDE 20 MG/ML
INJECTION, SOLUTION INFILTRATION; PERINEURAL AS NEEDED
Status: DISCONTINUED | OUTPATIENT
Start: 2025-08-27 | End: 2025-08-27

## 2025-08-27 RX ORDER — ACETAMINOPHEN 325 MG/1
650 TABLET ORAL ONCE
Status: DISCONTINUED | OUTPATIENT
Start: 2025-08-27 | End: 2025-08-27 | Stop reason: HOSPADM

## 2025-08-27 RX ORDER — CEFAZOLIN 1 G/1
INJECTION, POWDER, FOR SOLUTION INTRAVENOUS AS NEEDED
Status: DISCONTINUED | OUTPATIENT
Start: 2025-08-27 | End: 2025-08-27

## 2025-08-27 RX ORDER — KETOROLAC TROMETHAMINE 30 MG/ML
INJECTION, SOLUTION INTRAMUSCULAR; INTRAVENOUS AS NEEDED
Status: DISCONTINUED | OUTPATIENT
Start: 2025-08-27 | End: 2025-08-27

## 2025-08-27 RX ORDER — PROPOFOL 10 MG/ML
INJECTION, EMULSION INTRAVENOUS AS NEEDED
Status: DISCONTINUED | OUTPATIENT
Start: 2025-08-27 | End: 2025-08-27

## 2025-08-27 RX ORDER — OXYCODONE HYDROCHLORIDE 5 MG/1
5 TABLET ORAL EVERY 4 HOURS PRN
Status: DISCONTINUED | OUTPATIENT
Start: 2025-08-27 | End: 2025-08-27 | Stop reason: HOSPADM

## 2025-08-27 RX ADMIN — KETOROLAC TROMETHAMINE 30 MG: 30 INJECTION, SOLUTION INTRAMUSCULAR; INTRAVENOUS at 08:00

## 2025-08-27 RX ADMIN — PROPOFOL 50 MG: 10 INJECTION, EMULSION INTRAVENOUS at 07:54

## 2025-08-27 RX ADMIN — PROPOFOL 50 MG: 10 INJECTION, EMULSION INTRAVENOUS at 08:00

## 2025-08-27 RX ADMIN — CEFAZOLIN 3 G: 1 INJECTION, POWDER, FOR SOLUTION INTRAMUSCULAR; INTRAVENOUS at 07:53

## 2025-08-27 RX ADMIN — PROPOFOL 120 MCG/KG/MIN: 10 INJECTION, EMULSION INTRAVENOUS at 07:58

## 2025-08-27 RX ADMIN — LIDOCAINE HYDROCHLORIDE 40 MG: 20 INJECTION, SOLUTION INFILTRATION; PERINEURAL at 07:57

## 2025-08-27 RX ADMIN — PROPOFOL 50 MG: 10 INJECTION, EMULSION INTRAVENOUS at 08:08

## 2025-08-27 SDOH — HEALTH STABILITY: MENTAL HEALTH: CURRENT SMOKER: 0

## 2025-08-27 ASSESSMENT — PAIN SCALES - GENERAL
PAINLEVEL_OUTOF10: 0 - NO PAIN

## 2025-08-27 ASSESSMENT — PAIN - FUNCTIONAL ASSESSMENT
PAIN_FUNCTIONAL_ASSESSMENT: 0-10

## 2025-09-02 ENCOUNTER — OFFICE VISIT (OUTPATIENT)
Dept: CARDIOLOGY | Facility: HOSPITAL | Age: 65
End: 2025-09-02
Payer: MEDICARE

## 2025-09-02 VITALS
HEIGHT: 75 IN | DIASTOLIC BLOOD PRESSURE: 72 MMHG | SYSTOLIC BLOOD PRESSURE: 120 MMHG | WEIGHT: 315 LBS | BODY MASS INDEX: 39.17 KG/M2 | OXYGEN SATURATION: 95 % | HEART RATE: 70 BPM

## 2025-09-02 DIAGNOSIS — I48.0 PAROXYSMAL ATRIAL FIBRILLATION (MULTI): Primary | ICD-10-CM

## 2025-09-02 DIAGNOSIS — I10 BENIGN ESSENTIAL HYPERTENSION: ICD-10-CM

## 2025-09-02 DIAGNOSIS — E11.9 TYPE 2 DIABETES MELLITUS WITHOUT COMPLICATION, WITHOUT LONG-TERM CURRENT USE OF INSULIN: ICD-10-CM

## 2025-09-02 DIAGNOSIS — I71.21 ANEURYSM OF ASCENDING AORTA WITHOUT RUPTURE: ICD-10-CM

## 2025-09-02 DIAGNOSIS — E78.00 PURE HYPERCHOLESTEROLEMIA: ICD-10-CM

## 2025-09-02 DIAGNOSIS — Z87.891 FORMER SMOKER: ICD-10-CM

## 2025-09-02 DIAGNOSIS — E66.01 MORBID OBESITY (MULTI): ICD-10-CM

## 2025-09-02 LAB
ATRIAL RATE: 70 BPM
P AXIS: 69 DEGREES
P OFFSET: 178 MS
P ONSET: 118 MS
PR INTERVAL: 206 MS
Q ONSET: 221 MS
QRS COUNT: 12 BEATS
QRS DURATION: 90 MS
QT INTERVAL: 374 MS
QTC CALCULATION(BAZETT): 403 MS
QTC FREDERICIA: 393 MS
R AXIS: -8 DEGREES
T AXIS: 34 DEGREES
T OFFSET: 408 MS
VENTRICULAR RATE: 70 BPM

## 2025-09-02 PROCEDURE — G2211 COMPLEX E/M VISIT ADD ON: HCPCS | Performed by: INTERNAL MEDICINE

## 2025-09-02 PROCEDURE — 1160F RVW MEDS BY RX/DR IN RCRD: CPT | Performed by: INTERNAL MEDICINE

## 2025-09-02 PROCEDURE — 93005 ELECTROCARDIOGRAM TRACING: CPT | Performed by: INTERNAL MEDICINE

## 2025-09-02 PROCEDURE — 99212 OFFICE O/P EST SF 10 MIN: CPT

## 2025-09-02 PROCEDURE — 4010F ACE/ARB THERAPY RXD/TAKEN: CPT | Performed by: INTERNAL MEDICINE

## 2025-09-02 PROCEDURE — 3008F BODY MASS INDEX DOCD: CPT | Performed by: INTERNAL MEDICINE

## 2025-09-02 PROCEDURE — 3074F SYST BP LT 130 MM HG: CPT | Performed by: INTERNAL MEDICINE

## 2025-09-02 PROCEDURE — 1036F TOBACCO NON-USER: CPT | Performed by: INTERNAL MEDICINE

## 2025-09-02 PROCEDURE — 99214 OFFICE O/P EST MOD 30 MIN: CPT | Performed by: INTERNAL MEDICINE

## 2025-09-02 PROCEDURE — 1159F MED LIST DOCD IN RCRD: CPT | Performed by: INTERNAL MEDICINE

## 2025-09-02 PROCEDURE — 3051F HG A1C>EQUAL 7.0%<8.0%: CPT | Performed by: INTERNAL MEDICINE

## 2025-09-02 PROCEDURE — 3078F DIAST BP <80 MM HG: CPT | Performed by: INTERNAL MEDICINE

## 2025-09-02 RX ORDER — ATORVASTATIN CALCIUM 40 MG/1
40 TABLET, FILM COATED ORAL DAILY
Qty: 90 TABLET | Refills: 3 | Status: SHIPPED | OUTPATIENT
Start: 2025-09-02 | End: 2026-09-02

## 2025-09-03 ENCOUNTER — HOSPITAL ENCOUNTER (OUTPATIENT)
Dept: RADIOLOGY | Facility: EXTERNAL LOCATION | Age: 65
Discharge: HOME | End: 2025-09-03

## 2025-09-03 ENCOUNTER — HOSPITAL ENCOUNTER (OUTPATIENT)
Dept: RADIATION ONCOLOGY | Facility: HOSPITAL | Age: 65
Setting detail: RADIATION/ONCOLOGY SERIES
Discharge: HOME | End: 2025-09-03
Payer: MEDICARE

## 2025-09-03 DIAGNOSIS — C61 MALIGNANT NEOPLASM OF PROSTATE (MULTI): ICD-10-CM

## 2025-09-03 DIAGNOSIS — C61 MALIGNANT NEOPLASM OF PROSTATE (MULTI): Primary | ICD-10-CM

## 2025-09-03 PROCEDURE — 77334 RADIATION TREATMENT AID(S): CPT | Performed by: STUDENT IN AN ORGANIZED HEALTH CARE EDUCATION/TRAINING PROGRAM

## 2025-09-03 PROCEDURE — 77290 THER RAD SIMULAJ FIELD CPLX: CPT | Performed by: STUDENT IN AN ORGANIZED HEALTH CARE EDUCATION/TRAINING PROGRAM

## 2025-11-10 ENCOUNTER — APPOINTMENT (OUTPATIENT)
Dept: PRIMARY CARE | Facility: CLINIC | Age: 65
End: 2025-11-10
Payer: MEDICARE

## 2026-08-18 ENCOUNTER — APPOINTMENT (OUTPATIENT)
Dept: UROLOGY | Facility: CLINIC | Age: 66
End: 2026-08-18
Payer: MEDICARE

## (undated) DEVICE — Device

## (undated) DEVICE — SOLUTION, IRRIGATION, X RX SODIUM CHL 0.9%, 1000ML BTL

## (undated) DEVICE — NEEDLE, BIOPSY, MAX CORE, 18G

## (undated) DEVICE — CATHETHER, CS, BI-DIRECTIONAL, 10 POLES, D-F TYPE

## (undated) DEVICE — LABELS, OR GENERAL, W/MARKER

## (undated) DEVICE — BLADE, ROTATION MORCELLATOR, 4.8MM X 335MM, PIRHANA, DISPOSABLE

## (undated) DEVICE — TUBING, MORCELLATOR PUMP, DISPOSABLE

## (undated) DEVICE — GOWN, SURGICAL, SIRUS, NON REINFORCED, LARGE

## (undated) DEVICE — COVER, TABLE, 44X90

## (undated) DEVICE — SHEATH, STEERABLE, SUREFLEX, MEDIUM CURVE

## (undated) DEVICE — 22FR, 30CC, BARDEX LUBRICATH HEMATURIA LATEX FOLEY CATHETER, COUDE TIP, 3-WAY

## (undated) DEVICE — CATHETER, ULTRASOUND, DIAGNOSTIC, ACUNAV, 10 FR X 90 CM

## (undated) DEVICE — SYRINGE, 10 CC, LUER LOCK

## (undated) DEVICE — KIT, NERVE ROOT BLOCK, SPINAL, 22 G X 6 IN

## (undated) DEVICE — IRRIGATION SET, CYSTOSCOPY, TURP, Y, CONTINUOUS, 81 IN

## (undated) DEVICE — GLOVE, SURGICAL, PROTEXIS PI , 7.5, PF, LF

## (undated) DEVICE — DRESSING, TELFA, 3X4

## (undated) DEVICE — CATHETER, PENTARAY, NAV ECO, 7FR, 2-6-2 SPACING, F CURVE

## (undated) DEVICE — GLOVE, PROTEXIS PI CLASSIC, SZ-6.5, PF, LF

## (undated) DEVICE — TUBING, ELLIK, FOR ELLIK/TOOMEY ADAPTERS

## (undated) DEVICE — CATHETER, THERMOCOOL SMART TOUCH, SF, D-F CURVE

## (undated) DEVICE — UROVAC BLADDER EVACUATOR

## (undated) DEVICE — BAG, DRAINAGE, CONTINUOUS IRRIGATION, 4L

## (undated) DEVICE — CONTAINER, SPECIMEN, 120 ML, STERILE

## (undated) DEVICE — TUBING SET, IRRIGATION, SMARTABLATE

## (undated) DEVICE — NEEDLE, TRANSSEPTAL, CURVE, W/STYLET, ADULT, 18 G X 71 CM

## (undated) DEVICE — DRESSING, ISLAND, TELFA, 4 X 5 IN

## (undated) DEVICE — INTRODUCER, CATHETER, HEMOSTASIS, FAST-CATH, 11 FR X 12 CM

## (undated) DEVICE — APPLICATOR, CHLORAPREP, W/ORANGE TINT, 26ML

## (undated) DEVICE — SYRINGE, TOOMEY, IRRIGATION, 60ML, INDIVIDUAL WRAP, STERILE

## (undated) DEVICE — GOWN, ASTOUND, XL

## (undated) DEVICE — INTRODUCER, HEMOSTASIS, STR/J .038 IN, 8.5FR 12CM

## (undated) DEVICE — CATHETER, LASER URETERAL, 7.1FR, 40CM

## (undated) DEVICE — PRECISIONPOINT

## (undated) DEVICE — PATCHES, EXTERNAL REFERENCE, CARTO3

## (undated) DEVICE — SYRINGE, 60 CC, IRRIGATION, TOOMEY TIP

## (undated) DEVICE — TOWEL, SURGICAL, NEURO, O/R, 16 X 26, BLUE, STERILE

## (undated) DEVICE — BLANKET, LOWER BODY, VHA PLUS, ADULT

## (undated) DEVICE — SOLUTION, IRRIGATION, USP, SODIUM CHLORIDE 0.9%, 3000 ML, BAG

## (undated) DEVICE — SYSTEM, SPACEOAR VUE, HYDROGEL

## (undated) DEVICE — DRESSING, ABD PAD, TENDERSORB, 7.5 X 8 IN, NS

## (undated) DEVICE — SYRINGE, 20 CC, LUER LOCK

## (undated) DEVICE — BRIEF, CURITY, XLARGE, MESH

## (undated) DEVICE — INTRODUCER, SHEATH, FAST-CATH, 8FR X 12CM, C-LOCK